# Patient Record
Sex: FEMALE | Race: WHITE | NOT HISPANIC OR LATINO | Employment: UNEMPLOYED | URBAN - METROPOLITAN AREA
[De-identification: names, ages, dates, MRNs, and addresses within clinical notes are randomized per-mention and may not be internally consistent; named-entity substitution may affect disease eponyms.]

---

## 2018-01-24 ENCOUNTER — TRANSCRIBE ORDERS (OUTPATIENT)
Dept: ADMINISTRATIVE | Facility: HOSPITAL | Age: 58
End: 2018-01-24

## 2018-01-24 DIAGNOSIS — Z12.31 VISIT FOR SCREENING MAMMOGRAM: Primary | ICD-10-CM

## 2018-02-12 ENCOUNTER — APPOINTMENT (OUTPATIENT)
Dept: MAMMOGRAPHY | Facility: HOSPITAL | Age: 58
End: 2018-02-12

## 2018-02-16 ENCOUNTER — APPOINTMENT (OUTPATIENT)
Dept: OTHER | Facility: HOSPITAL | Age: 58
End: 2018-02-16

## 2018-02-16 ENCOUNTER — HOSPITAL ENCOUNTER (OUTPATIENT)
Dept: MAMMOGRAPHY | Facility: HOSPITAL | Age: 58
Discharge: HOME | End: 2018-02-16
Admitting: NURSE PRACTITIONER

## 2018-02-16 DIAGNOSIS — Z12.31 VISIT FOR SCREENING MAMMOGRAM: ICD-10-CM

## 2018-02-16 PROCEDURE — 77063 BREAST TOMOSYNTHESIS BI: CPT

## 2018-02-16 PROCEDURE — 77063 BREAST TOMOSYNTHESIS BI: CPT | Performed by: RADIOLOGY

## 2018-02-16 PROCEDURE — 77067 SCR MAMMO BI INCL CAD: CPT | Performed by: RADIOLOGY

## 2018-02-16 PROCEDURE — 77067 SCR MAMMO BI INCL CAD: CPT

## 2018-03-28 ENCOUNTER — APPOINTMENT (OUTPATIENT)
Dept: MAMMOGRAPHY | Facility: HOSPITAL | Age: 58
End: 2018-03-28

## 2018-04-05 ENCOUNTER — TRANSCRIBE ORDERS (OUTPATIENT)
Dept: MAMMOGRAPHY | Facility: HOSPITAL | Age: 58
End: 2018-04-05

## 2018-04-05 ENCOUNTER — HOSPITAL ENCOUNTER (OUTPATIENT)
Dept: ULTRASOUND IMAGING | Facility: HOSPITAL | Age: 58
Discharge: HOME | End: 2018-04-05

## 2018-04-05 ENCOUNTER — HOSPITAL ENCOUNTER (OUTPATIENT)
Dept: MAMMOGRAPHY | Facility: HOSPITAL | Age: 58
Discharge: HOME | End: 2018-04-05
Admitting: NURSE PRACTITIONER

## 2018-04-05 DIAGNOSIS — R92.8 ABNORMAL MAMMOGRAM: ICD-10-CM

## 2018-04-05 DIAGNOSIS — R92.8 ABNORMAL MAMMOGRAM: Primary | ICD-10-CM

## 2018-04-05 PROCEDURE — 77062 BREAST TOMOSYNTHESIS BI: CPT | Performed by: RADIOLOGY

## 2018-04-05 PROCEDURE — 76642 ULTRASOUND BREAST LIMITED: CPT

## 2018-04-05 PROCEDURE — G0279 TOMOSYNTHESIS, MAMMO: HCPCS

## 2018-04-05 PROCEDURE — 77066 DX MAMMO INCL CAD BI: CPT | Performed by: RADIOLOGY

## 2018-04-05 PROCEDURE — 77066 DX MAMMO INCL CAD BI: CPT

## 2018-04-05 PROCEDURE — 76642 ULTRASOUND BREAST LIMITED: CPT | Performed by: RADIOLOGY

## 2021-09-30 ENCOUNTER — TRANSCRIBE ORDERS (OUTPATIENT)
Dept: ADMINISTRATIVE | Facility: HOSPITAL | Age: 61
End: 2021-09-30

## 2021-09-30 DIAGNOSIS — Z12.31 VISIT FOR SCREENING MAMMOGRAM: Primary | ICD-10-CM

## 2021-10-27 ENCOUNTER — APPOINTMENT (OUTPATIENT)
Dept: MAMMOGRAPHY | Facility: HOSPITAL | Age: 61
End: 2021-10-27

## 2023-06-12 ENCOUNTER — TRANSCRIBE ORDERS (OUTPATIENT)
Dept: ADMINISTRATIVE | Facility: HOSPITAL | Age: 63
End: 2023-06-12

## 2023-06-12 DIAGNOSIS — R92.8 ABNORMAL MAMMOGRAM: Primary | ICD-10-CM

## 2025-03-26 ENCOUNTER — TRANSCRIBE ORDERS (OUTPATIENT)
Dept: ADMINISTRATIVE | Facility: HOSPITAL | Age: 65
End: 2025-03-26
Payer: MEDICARE

## 2025-03-26 DIAGNOSIS — N63.21 MASS OF UPPER OUTER QUADRANT OF LEFT BREAST: ICD-10-CM

## 2025-03-26 DIAGNOSIS — N63.20 MASS OF LEFT BREAST, UNSPECIFIED QUADRANT: Primary | ICD-10-CM

## 2025-04-08 LAB
NCCN CRITERIA FLAG: ABNORMAL
TYRER CUZICK SCORE: 4.2

## 2025-04-11 ENCOUNTER — RESULTS FOLLOW-UP (OUTPATIENT)
Facility: HOSPITAL | Age: 65
End: 2025-04-11
Payer: MEDICARE

## 2025-04-16 ENCOUNTER — HOSPITAL ENCOUNTER (OUTPATIENT)
Facility: HOSPITAL | Age: 65
Discharge: HOME | End: 2025-04-16
Payer: MEDICARE

## 2025-04-16 DIAGNOSIS — N63.21 MASS OF UPPER OUTER QUADRANT OF LEFT BREAST: ICD-10-CM

## 2025-04-16 PROCEDURE — 76642 ULTRASOUND BREAST LIMITED: CPT

## 2025-04-16 PROCEDURE — G0279 TOMOSYNTHESIS, MAMMO: HCPCS

## 2025-04-16 PROCEDURE — 77066 DX MAMMO INCL CAD BI: CPT

## 2025-04-17 ENCOUNTER — TRANSCRIBE ORDERS (OUTPATIENT)
Dept: ADMINISTRATIVE | Facility: HOSPITAL | Age: 65
End: 2025-04-17
Payer: MEDICARE

## 2025-04-17 DIAGNOSIS — R92.8 ABNORMAL MAMMOGRAM: Primary | ICD-10-CM

## 2025-04-21 DIAGNOSIS — Z13.79 GENETIC TESTING: Primary | ICD-10-CM

## 2025-04-30 ENCOUNTER — HOSPITAL ENCOUNTER (OUTPATIENT)
Facility: HOSPITAL | Age: 65
Discharge: HOME | End: 2025-04-30
Payer: MEDICARE

## 2025-04-30 DIAGNOSIS — R92.8 ABNORMAL MAMMOGRAM: ICD-10-CM

## 2025-04-30 PROCEDURE — 25010000002 LIDOCAINE 1 % SOLUTION: Performed by: RADIOLOGY

## 2025-04-30 PROCEDURE — A4648 IMPLANTABLE TISSUE MARKER: HCPCS

## 2025-04-30 PROCEDURE — 25010000002 LIDOCAINE 1% - EPINEPHRINE 1:100000 1 %-1:100000 SOLUTION: Performed by: RADIOLOGY

## 2025-04-30 RX ORDER — LIDOCAINE HYDROCHLORIDE 10 MG/ML
5 INJECTION, SOLUTION INFILTRATION; PERINEURAL ONCE
Status: COMPLETED | OUTPATIENT
Start: 2025-04-30 | End: 2025-04-30

## 2025-04-30 RX ORDER — LIDOCAINE HYDROCHLORIDE AND EPINEPHRINE 10; 10 MG/ML; UG/ML
10 INJECTION, SOLUTION INFILTRATION; PERINEURAL ONCE
Status: COMPLETED | OUTPATIENT
Start: 2025-04-30 | End: 2025-04-30

## 2025-04-30 RX ADMIN — LIDOCAINE HYDROCHLORIDE,EPINEPHRINE BITARTRATE 5 ML: 10; .01 INJECTION, SOLUTION INFILTRATION; PERINEURAL at 13:45

## 2025-04-30 RX ADMIN — LIDOCAINE HYDROCHLORIDE 2.5 ML: 10 INJECTION, SOLUTION INFILTRATION; PERINEURAL at 13:45

## 2025-04-30 RX ADMIN — LIDOCAINE HYDROCHLORIDE,EPINEPHRINE BITARTRATE 10 ML: 10; .01 INJECTION, SOLUTION INFILTRATION; PERINEURAL at 13:45

## 2025-04-30 NOTE — PROGRESS NOTES
Alert and oriented. Denies discomfort, no active bleeding, steri-strips not visualized, gauze dressing intact. Cold pack applied to breast over bandage. Questions answered, support given. Verbalizes and demonstrates understanding of post-care instructions, written copy given.

## 2025-05-05 ENCOUNTER — TELEPHONE (OUTPATIENT)
Facility: HOSPITAL | Age: 65
End: 2025-05-05
Payer: MEDICARE

## 2025-05-05 LAB
CYTO UR: NORMAL
CYTO UR: NORMAL
LAB AP CASE REPORT: NORMAL
LAB AP CASE REPORT: NORMAL
LAB AP CLINICAL INFORMATION: NORMAL
LAB AP CLINICAL INFORMATION: NORMAL
LAB AP DIAGNOSIS COMMENT: NORMAL
LAB AP DIAGNOSIS COMMENT: NORMAL
LAB AP SPECIAL STAINS: NORMAL
LAB AP SPECIAL STAINS: NORMAL
PATH REPORT.FINAL DX SPEC: NORMAL
PATH REPORT.FINAL DX SPEC: NORMAL
PATH REPORT.GROSS SPEC: NORMAL
PATH REPORT.GROSS SPEC: NORMAL

## 2025-05-06 ENCOUNTER — APPOINTMENT (OUTPATIENT)
Dept: CT IMAGING | Facility: HOSPITAL | Age: 65
End: 2025-05-06
Payer: MEDICARE

## 2025-05-06 ENCOUNTER — TELEPHONE (OUTPATIENT)
Facility: HOSPITAL | Age: 65
End: 2025-05-06
Payer: MEDICARE

## 2025-05-06 ENCOUNTER — HOSPITAL ENCOUNTER (INPATIENT)
Facility: HOSPITAL | Age: 65
LOS: 1 days | Discharge: HOME | End: 2025-05-07
Attending: EMERGENCY MEDICINE | Admitting: INTERNAL MEDICINE
Payer: MEDICARE

## 2025-05-06 DIAGNOSIS — J96.02 ACUTE RESPIRATORY FAILURE WITH HYPOXIA AND HYPERCAPNIA: ICD-10-CM

## 2025-05-06 DIAGNOSIS — D72.829 LEUKOCYTOSIS, UNSPECIFIED TYPE: ICD-10-CM

## 2025-05-06 DIAGNOSIS — I95.9 HYPOTENSION, UNSPECIFIED HYPOTENSION TYPE: ICD-10-CM

## 2025-05-06 DIAGNOSIS — J96.01 ACUTE RESPIRATORY FAILURE WITH HYPOXIA: ICD-10-CM

## 2025-05-06 DIAGNOSIS — J96.01 ACUTE RESPIRATORY FAILURE WITH HYPOXIA AND HYPERCAPNIA: ICD-10-CM

## 2025-05-06 DIAGNOSIS — R41.82 ALTERED MENTAL STATUS, UNSPECIFIED ALTERED MENTAL STATUS TYPE: Primary | ICD-10-CM

## 2025-05-06 DIAGNOSIS — J44.0 CHRONIC OBSTRUCTIVE PULMONARY DISEASE WITH ACUTE LOWER RESPIRATORY INFECTION: ICD-10-CM

## 2025-05-06 PROBLEM — G89.4 CHRONIC PAIN SYNDROME: Status: ACTIVE | Noted: 2025-05-06

## 2025-05-06 PROBLEM — J44.9 COPD (CHRONIC OBSTRUCTIVE PULMONARY DISEASE): Status: ACTIVE | Noted: 2025-05-06

## 2025-05-06 PROBLEM — Z72.0 TOBACCO USE: Status: ACTIVE | Noted: 2025-05-06

## 2025-05-06 PROBLEM — C50.919 MALIGNANT NEOPLASM OF FEMALE BREAST: Status: ACTIVE | Noted: 2025-05-06

## 2025-05-06 LAB
ALBUMIN SERPL-MCNC: 3.6 G/DL (ref 3.5–5.2)
ALBUMIN/GLOB SERPL: 0.9 G/DL
ALP SERPL-CCNC: 110 U/L (ref 39–117)
ALT SERPL W P-5'-P-CCNC: 19 U/L (ref 1–33)
AMPHET+METHAMPHET UR QL: NEGATIVE
AMPHETAMINES UR QL: NEGATIVE
ANION GAP SERPL CALCULATED.3IONS-SCNC: 10 MMOL/L (ref 5–15)
APAP SERPL-MCNC: <5 MCG/ML (ref 0–30)
ARTERIAL PATENCY WRIST A: POSITIVE
AST SERPL-CCNC: 41 U/L (ref 1–32)
ATMOSPHERIC PRESS: ABNORMAL MM[HG]
B PARAPERT DNA SPEC QL NAA+PROBE: NOT DETECTED
B PERT DNA SPEC QL NAA+PROBE: NOT DETECTED
BARBITURATES UR QL SCN: NEGATIVE
BASE EXCESS BLDA CALC-SCNC: 7.8 MMOL/L (ref 0–2)
BASOPHILS # BLD AUTO: 0.04 10*3/MM3 (ref 0–0.2)
BASOPHILS NFR BLD AUTO: 0.3 % (ref 0–1.5)
BDY SITE: ABNORMAL
BENZODIAZ UR QL SCN: NEGATIVE
BILIRUB SERPL-MCNC: 0.2 MG/DL (ref 0–1.2)
BILIRUB UR QL STRIP: NEGATIVE
BODY TEMPERATURE: 37
BUN SERPL-MCNC: 10 MG/DL (ref 8–23)
BUN/CREAT SERPL: 11.6 (ref 7–25)
BUPRENORPHINE SERPL-MCNC: NEGATIVE NG/ML
C PNEUM DNA NPH QL NAA+NON-PROBE: NOT DETECTED
CALCIUM SPEC-SCNC: 9.6 MG/DL (ref 8.6–10.5)
CANNABINOIDS SERPL QL: POSITIVE
CHLORIDE SERPL-SCNC: 104 MMOL/L (ref 98–107)
CLARITY UR: CLEAR
CO2 BLDA-SCNC: 34.1 MMOL/L (ref 22–33)
CO2 SERPL-SCNC: 29 MMOL/L (ref 22–29)
COCAINE UR QL: NEGATIVE
COHGB MFR BLD: 2.6 % (ref 0–2)
COLOR UR: YELLOW
CREAT SERPL-MCNC: 0.86 MG/DL (ref 0.57–1)
D DIMER PPP FEU-MCNC: 2.13 MCGFEU/ML (ref 0–0.66)
D-LACTATE SERPL-SCNC: 0.8 MMOL/L (ref 0.5–2)
D-LACTATE SERPL-SCNC: 2.5 MMOL/L (ref 0.5–2)
DEPRECATED RDW RBC AUTO: 51 FL (ref 37–54)
EGFRCR SERPLBLD CKD-EPI 2021: 74.6 ML/MIN/1.73
EOSINOPHIL # BLD AUTO: 0 10*3/MM3 (ref 0–0.4)
EOSINOPHIL NFR BLD AUTO: 0 % (ref 0.3–6.2)
EPAP: 0
ERYTHROCYTE [DISTWIDTH] IN BLOOD BY AUTOMATED COUNT: 13.3 % (ref 12.3–15.4)
ERYTHROCYTE [SEDIMENTATION RATE] IN BLOOD: 88 MM/HR (ref 0–30)
ETHANOL BLD-MCNC: <10 MG/DL (ref 0–10)
FENTANYL UR-MCNC: NEGATIVE NG/ML
FLUAV SUBTYP SPEC NAA+PROBE: NOT DETECTED
FLUBV RNA ISLT QL NAA+PROBE: NOT DETECTED
GEN 5 1HR TROPONIN T REFLEX: 149 NG/L
GLOBULIN UR ELPH-MCNC: 4.1 GM/DL
GLUCOSE SERPL-MCNC: 124 MG/DL (ref 65–99)
GLUCOSE UR STRIP-MCNC: NEGATIVE MG/DL
HADV DNA SPEC NAA+PROBE: NOT DETECTED
HCO3 BLDA-SCNC: 32.7 MMOL/L (ref 20–26)
HCOV 229E RNA SPEC QL NAA+PROBE: NOT DETECTED
HCOV HKU1 RNA SPEC QL NAA+PROBE: NOT DETECTED
HCOV NL63 RNA SPEC QL NAA+PROBE: NOT DETECTED
HCOV OC43 RNA SPEC QL NAA+PROBE: NOT DETECTED
HCT VFR BLD AUTO: 42.5 % (ref 34–46.6)
HCT VFR BLD CALC: 37.7 % (ref 38–51)
HGB BLD-MCNC: 12.9 G/DL (ref 12–15.9)
HGB BLDA-MCNC: 12.3 G/DL (ref 14–18)
HGB UR QL STRIP.AUTO: NEGATIVE
HMPV RNA NPH QL NAA+NON-PROBE: NOT DETECTED
HPIV1 RNA ISLT QL NAA+PROBE: NOT DETECTED
HPIV2 RNA SPEC QL NAA+PROBE: NOT DETECTED
HPIV3 RNA NPH QL NAA+PROBE: NOT DETECTED
HPIV4 P GENE NPH QL NAA+PROBE: NOT DETECTED
IMM GRANULOCYTES # BLD AUTO: 0.09 10*3/MM3 (ref 0–0.05)
IMM GRANULOCYTES NFR BLD AUTO: 0.7 % (ref 0–0.5)
INHALED O2 CONCENTRATION: 28 %
IPAP: 0
KETONES UR QL STRIP: NEGATIVE
LEUKOCYTE ESTERASE UR QL STRIP.AUTO: NEGATIVE
LYMPHOCYTES # BLD AUTO: 1.04 10*3/MM3 (ref 0.7–3.1)
LYMPHOCYTES NFR BLD AUTO: 7.9 % (ref 19.6–45.3)
M PNEUMO IGG SER IA-ACNC: NOT DETECTED
MAGNESIUM SERPL-MCNC: 2.5 MG/DL (ref 1.6–2.4)
MCH RBC QN AUTO: 31.1 PG (ref 26.6–33)
MCHC RBC AUTO-ENTMCNC: 30.4 G/DL (ref 31.5–35.7)
MCV RBC AUTO: 102.4 FL (ref 79–97)
METHADONE UR QL SCN: POSITIVE
METHGB BLD QL: 0.2 % (ref 0–1.5)
MODALITY: ABNORMAL
MONOCYTES # BLD AUTO: 0.59 10*3/MM3 (ref 0.1–0.9)
MONOCYTES NFR BLD AUTO: 4.5 % (ref 5–12)
NEUTROPHILS NFR BLD AUTO: 11.45 10*3/MM3 (ref 1.7–7)
NEUTROPHILS NFR BLD AUTO: 86.6 % (ref 42.7–76)
NITRITE UR QL STRIP: NEGATIVE
NRBC BLD AUTO-RTO: 0 /100 WBC (ref 0–0.2)
NT-PROBNP SERPL-MCNC: 502.9 PG/ML (ref 0–900)
OPIATES UR QL: NEGATIVE
OXYCODONE UR QL SCN: NEGATIVE
OXYHGB MFR BLDV: 94.3 % (ref 94–99)
PAW @ PEAK INSP FLOW SETTING VENT: 0 CMH2O
PCO2 BLDA: 45.7 MM HG (ref 35–45)
PCO2 TEMP ADJ BLD: 45.7 MM HG (ref 35–45)
PCP UR QL SCN: NEGATIVE
PH BLDA: 7.46 PH UNITS (ref 7.35–7.45)
PH UR STRIP.AUTO: 7 [PH] (ref 5–8)
PH, TEMP CORRECTED: 7.46 PH UNITS
PLATELET # BLD AUTO: 245 10*3/MM3 (ref 140–450)
PMV BLD AUTO: 10.6 FL (ref 6–12)
PO2 BLDA: 76.4 MM HG (ref 83–108)
PO2 TEMP ADJ BLD: 76.4 MM HG (ref 83–108)
POTASSIUM SERPL-SCNC: 3.9 MMOL/L (ref 3.5–5.2)
PROCALCITONIN SERPL-MCNC: 0.21 NG/ML (ref 0–0.25)
PROT SERPL-MCNC: 7.7 G/DL (ref 6–8.5)
PROT UR QL STRIP: NEGATIVE
QT INTERVAL: 400 MS
QTC INTERVAL: 508 MS
RBC # BLD AUTO: 4.15 10*6/MM3 (ref 3.77–5.28)
RHINOVIRUS RNA SPEC NAA+PROBE: NOT DETECTED
RSV RNA NPH QL NAA+NON-PROBE: NOT DETECTED
SALICYLATES SERPL-MCNC: <0.3 MG/DL
SARS-COV-2 RNA NPH QL NAA+NON-PROBE: NOT DETECTED
SODIUM SERPL-SCNC: 143 MMOL/L (ref 136–145)
SP GR UR STRIP: 1.01 (ref 1–1.03)
TOTAL RATE: 0 BREATHS/MINUTE
TRICYCLICS UR QL SCN: NEGATIVE
TROPONIN T % DELTA: 4
TROPONIN T NUMERIC DELTA: 6 NG/L
TROPONIN T SERPL HS-MCNC: 143 NG/L
TSH SERPL DL<=0.05 MIU/L-ACNC: 1.01 UIU/ML (ref 0.27–4.2)
UROBILINOGEN UR QL STRIP: NORMAL
WBC NRBC COR # BLD AUTO: 13.21 10*3/MM3 (ref 3.4–10.8)

## 2025-05-06 PROCEDURE — 25010000002 HALOPERIDOL LACTATE PER 5 MG: Performed by: EMERGENCY MEDICINE

## 2025-05-06 PROCEDURE — 25810000003 SODIUM CHLORIDE 0.9 % SOLUTION: Performed by: EMERGENCY MEDICINE

## 2025-05-06 PROCEDURE — 80179 DRUG ASSAY SALICYLATE: CPT | Performed by: EMERGENCY MEDICINE

## 2025-05-06 PROCEDURE — 83605 ASSAY OF LACTIC ACID: CPT | Performed by: EMERGENCY MEDICINE

## 2025-05-06 PROCEDURE — 85025 COMPLETE CBC W/AUTO DIFF WBC: CPT | Performed by: EMERGENCY MEDICINE

## 2025-05-06 PROCEDURE — 82077 ASSAY SPEC XCP UR&BREATH IA: CPT | Performed by: EMERGENCY MEDICINE

## 2025-05-06 PROCEDURE — 99291 CRITICAL CARE FIRST HOUR: CPT

## 2025-05-06 PROCEDURE — 87040 BLOOD CULTURE FOR BACTERIA: CPT | Performed by: EMERGENCY MEDICINE

## 2025-05-06 PROCEDURE — 85652 RBC SED RATE AUTOMATED: CPT | Performed by: EMERGENCY MEDICINE

## 2025-05-06 PROCEDURE — 36415 COLL VENOUS BLD VENIPUNCTURE: CPT

## 2025-05-06 PROCEDURE — 80307 DRUG TEST PRSMV CHEM ANLYZR: CPT | Performed by: EMERGENCY MEDICINE

## 2025-05-06 PROCEDURE — 94799 UNLISTED PULMONARY SVC/PX: CPT

## 2025-05-06 PROCEDURE — 0202U NFCT DS 22 TRGT SARS-COV-2: CPT | Performed by: EMERGENCY MEDICINE

## 2025-05-06 PROCEDURE — 84145 PROCALCITONIN (PCT): CPT | Performed by: EMERGENCY MEDICINE

## 2025-05-06 PROCEDURE — 25010000002 THIAMINE HCL 200 MG/2ML SOLUTION: Performed by: EMERGENCY MEDICINE

## 2025-05-06 PROCEDURE — 25010000002 METHYLPREDNISOLONE PER 125 MG: Performed by: EMERGENCY MEDICINE

## 2025-05-06 PROCEDURE — 93005 ELECTROCARDIOGRAM TRACING: CPT | Performed by: EMERGENCY MEDICINE

## 2025-05-06 PROCEDURE — 84443 ASSAY THYROID STIM HORMONE: CPT | Performed by: EMERGENCY MEDICINE

## 2025-05-06 PROCEDURE — 25010000002 VANCOMYCIN 2-0.9 GM/500ML-% SOLUTION: Performed by: EMERGENCY MEDICINE

## 2025-05-06 PROCEDURE — 94640 AIRWAY INHALATION TREATMENT: CPT

## 2025-05-06 PROCEDURE — 36600 WITHDRAWAL OF ARTERIAL BLOOD: CPT

## 2025-05-06 PROCEDURE — 84484 ASSAY OF TROPONIN QUANT: CPT | Performed by: EMERGENCY MEDICINE

## 2025-05-06 PROCEDURE — 25010000002 PIPERACILLIN SOD-TAZOBACTAM PER 1 G: Performed by: EMERGENCY MEDICINE

## 2025-05-06 PROCEDURE — 80053 COMPREHEN METABOLIC PANEL: CPT | Performed by: EMERGENCY MEDICINE

## 2025-05-06 PROCEDURE — 82805 BLOOD GASES W/O2 SATURATION: CPT

## 2025-05-06 PROCEDURE — 25010000002 ENOXAPARIN PER 10 MG: Performed by: NURSE PRACTITIONER

## 2025-05-06 PROCEDURE — 25510000001 IOPAMIDOL PER 1 ML: Performed by: INTERNAL MEDICINE

## 2025-05-06 PROCEDURE — P9612 CATHETERIZE FOR URINE SPEC: HCPCS

## 2025-05-06 PROCEDURE — 85379 FIBRIN DEGRADATION QUANT: CPT | Performed by: EMERGENCY MEDICINE

## 2025-05-06 PROCEDURE — 81003 URINALYSIS AUTO W/O SCOPE: CPT | Performed by: EMERGENCY MEDICINE

## 2025-05-06 PROCEDURE — 99291 CRITICAL CARE FIRST HOUR: CPT | Performed by: INTERNAL MEDICINE

## 2025-05-06 PROCEDURE — 70450 CT HEAD/BRAIN W/O DYE: CPT

## 2025-05-06 PROCEDURE — 83880 ASSAY OF NATRIURETIC PEPTIDE: CPT | Performed by: EMERGENCY MEDICINE

## 2025-05-06 PROCEDURE — 71250 CT THORAX DX C-: CPT

## 2025-05-06 PROCEDURE — 83050 HGB METHEMOGLOBIN QUAN: CPT

## 2025-05-06 PROCEDURE — 71275 CT ANGIOGRAPHY CHEST: CPT

## 2025-05-06 PROCEDURE — 80143 DRUG ASSAY ACETAMINOPHEN: CPT | Performed by: EMERGENCY MEDICINE

## 2025-05-06 PROCEDURE — 82375 ASSAY CARBOXYHB QUANT: CPT

## 2025-05-06 PROCEDURE — 83735 ASSAY OF MAGNESIUM: CPT | Performed by: EMERGENCY MEDICINE

## 2025-05-06 PROCEDURE — 94761 N-INVAS EAR/PLS OXIMETRY MLT: CPT

## 2025-05-06 RX ORDER — BISACODYL 5 MG/1
5 TABLET, DELAYED RELEASE ORAL DAILY PRN
Status: DISCONTINUED | OUTPATIENT
Start: 2025-05-06 | End: 2025-05-07 | Stop reason: HOSPADM

## 2025-05-06 RX ORDER — POLYETHYLENE GLYCOL 3350 17 G
2 POWDER IN PACKET (EA) ORAL
Status: DISCONTINUED | OUTPATIENT
Start: 2025-05-06 | End: 2025-05-07 | Stop reason: HOSPADM

## 2025-05-06 RX ORDER — SODIUM CHLORIDE 9 MG/ML
125 INJECTION, SOLUTION INTRAVENOUS CONTINUOUS
Status: ACTIVE | OUTPATIENT
Start: 2025-05-06 | End: 2025-05-06

## 2025-05-06 RX ORDER — GABAPENTIN 600 MG/1
600 TABLET ORAL 4 TIMES DAILY
COMMUNITY

## 2025-05-06 RX ORDER — IPRATROPIUM BROMIDE AND ALBUTEROL SULFATE 2.5; .5 MG/3ML; MG/3ML
3 SOLUTION RESPIRATORY (INHALATION) EVERY 6 HOURS PRN
Status: DISCONTINUED | OUTPATIENT
Start: 2025-05-06 | End: 2025-05-07 | Stop reason: HOSPADM

## 2025-05-06 RX ORDER — NICOTINE 21 MG/24HR
1 PATCH, TRANSDERMAL 24 HOURS TRANSDERMAL
Status: DISCONTINUED | OUTPATIENT
Start: 2025-05-06 | End: 2025-05-07 | Stop reason: HOSPADM

## 2025-05-06 RX ORDER — ALBUTEROL SULFATE 0.83 MG/ML
2.5 SOLUTION RESPIRATORY (INHALATION) EVERY 6 HOURS PRN
Status: DISCONTINUED | OUTPATIENT
Start: 2025-05-06 | End: 2025-05-06

## 2025-05-06 RX ORDER — POLYETHYLENE GLYCOL 3350 17 G/17G
17 POWDER, FOR SOLUTION ORAL DAILY PRN
Status: DISCONTINUED | OUTPATIENT
Start: 2025-05-06 | End: 2025-05-07 | Stop reason: HOSPADM

## 2025-05-06 RX ORDER — THIAMINE HYDROCHLORIDE 100 MG/ML
100 INJECTION, SOLUTION INTRAMUSCULAR; INTRAVENOUS ONCE
Status: COMPLETED | OUTPATIENT
Start: 2025-05-06 | End: 2025-05-06

## 2025-05-06 RX ORDER — IOPAMIDOL 755 MG/ML
75 INJECTION, SOLUTION INTRAVASCULAR
Status: COMPLETED | OUTPATIENT
Start: 2025-05-06 | End: 2025-05-06

## 2025-05-06 RX ORDER — AZITHROMYCIN 250 MG/1
250 TABLET, FILM COATED ORAL
Status: DISCONTINUED | OUTPATIENT
Start: 2025-05-06 | End: 2025-05-07 | Stop reason: HOSPADM

## 2025-05-06 RX ORDER — VANCOMYCIN 2 GRAM/500 ML IN 0.9 % SODIUM CHLORIDE INTRAVENOUS
20 ONCE
Status: COMPLETED | OUTPATIENT
Start: 2025-05-06 | End: 2025-05-06

## 2025-05-06 RX ORDER — SODIUM CHLORIDE FOR INHALATION 7 %
4 VIAL, NEBULIZER (ML) INHALATION
Status: DISCONTINUED | OUTPATIENT
Start: 2025-05-06 | End: 2025-05-07 | Stop reason: HOSPADM

## 2025-05-06 RX ORDER — HALOPERIDOL 5 MG/ML
2 INJECTION INTRAMUSCULAR ONCE
Status: COMPLETED | OUTPATIENT
Start: 2025-05-06 | End: 2025-05-06

## 2025-05-06 RX ORDER — BISACODYL 10 MG
10 SUPPOSITORY, RECTAL RECTAL DAILY PRN
Status: DISCONTINUED | OUTPATIENT
Start: 2025-05-06 | End: 2025-05-07 | Stop reason: HOSPADM

## 2025-05-06 RX ORDER — ENOXAPARIN SODIUM 100 MG/ML
40 INJECTION SUBCUTANEOUS DAILY
Status: DISCONTINUED | OUTPATIENT
Start: 2025-05-06 | End: 2025-05-07 | Stop reason: HOSPADM

## 2025-05-06 RX ORDER — SODIUM CHLORIDE 9 MG/ML
40 INJECTION, SOLUTION INTRAVENOUS AS NEEDED
Status: DISCONTINUED | OUTPATIENT
Start: 2025-05-06 | End: 2025-05-07 | Stop reason: HOSPADM

## 2025-05-06 RX ORDER — IPRATROPIUM BROMIDE AND ALBUTEROL SULFATE 2.5; .5 MG/3ML; MG/3ML
3 SOLUTION RESPIRATORY (INHALATION)
Status: DISCONTINUED | OUTPATIENT
Start: 2025-05-06 | End: 2025-05-07 | Stop reason: HOSPADM

## 2025-05-06 RX ORDER — AMOXICILLIN 250 MG
2 CAPSULE ORAL 2 TIMES DAILY
Status: DISCONTINUED | OUTPATIENT
Start: 2025-05-06 | End: 2025-05-07 | Stop reason: HOSPADM

## 2025-05-06 RX ORDER — SODIUM CHLORIDE 0.9 % (FLUSH) 0.9 %
10 SYRINGE (ML) INJECTION EVERY 12 HOURS SCHEDULED
Status: DISCONTINUED | OUTPATIENT
Start: 2025-05-06 | End: 2025-05-07 | Stop reason: HOSPADM

## 2025-05-06 RX ORDER — METHADONE HYDROCHLORIDE 5 MG/5ML
105 SOLUTION ORAL DAILY
COMMUNITY

## 2025-05-06 RX ORDER — ALBUTEROL SULFATE 90 UG/1
2 INHALANT RESPIRATORY (INHALATION) EVERY 6 HOURS PRN
Status: DISCONTINUED | OUTPATIENT
Start: 2025-05-06 | End: 2025-05-06

## 2025-05-06 RX ORDER — SODIUM CHLORIDE 0.9 % (FLUSH) 0.9 %
10 SYRINGE (ML) INJECTION AS NEEDED
Status: DISCONTINUED | OUTPATIENT
Start: 2025-05-06 | End: 2025-05-07 | Stop reason: HOSPADM

## 2025-05-06 RX ORDER — IPRATROPIUM BROMIDE AND ALBUTEROL SULFATE 2.5; .5 MG/3ML; MG/3ML
3 SOLUTION RESPIRATORY (INHALATION) ONCE
Status: COMPLETED | OUTPATIENT
Start: 2025-05-06 | End: 2025-05-06

## 2025-05-06 RX ORDER — ATORVASTATIN CALCIUM 40 MG/1
40 TABLET, FILM COATED ORAL NIGHTLY
Status: ON HOLD | COMMUNITY
End: 2025-05-07

## 2025-05-06 RX ORDER — BUDESONIDE 0.5 MG/2ML
0.5 INHALANT ORAL
Status: DISCONTINUED | OUTPATIENT
Start: 2025-05-06 | End: 2025-05-07 | Stop reason: HOSPADM

## 2025-05-06 RX ORDER — METHYLPREDNISOLONE SODIUM SUCCINATE 125 MG/2ML
125 INJECTION, POWDER, LYOPHILIZED, FOR SOLUTION INTRAMUSCULAR; INTRAVENOUS ONCE
Status: COMPLETED | OUTPATIENT
Start: 2025-05-06 | End: 2025-05-06

## 2025-05-06 RX ADMIN — METHYLPREDNISOLONE SODIUM SUCCINATE 125 MG: 125 INJECTION INTRAMUSCULAR; INTRAVENOUS at 11:13

## 2025-05-06 RX ADMIN — MUPIROCIN 1 APPLICATION: 20 OINTMENT TOPICAL at 20:33

## 2025-05-06 RX ADMIN — NICOTINE 1 PATCH: 21 PATCH TRANSDERMAL at 18:13

## 2025-05-06 RX ADMIN — SENNOSIDES AND DOCUSATE SODIUM 2 TABLET: 50; 8.6 TABLET ORAL at 20:33

## 2025-05-06 RX ADMIN — IPRATROPIUM BROMIDE AND ALBUTEROL SULFATE 3 ML: 2.5; .5 SOLUTION RESPIRATORY (INHALATION) at 19:23

## 2025-05-06 RX ADMIN — SODIUM CHLORIDE 1000 ML: 9 INJECTION, SOLUTION INTRAVENOUS at 11:18

## 2025-05-06 RX ADMIN — SODIUM CHLORIDE 125 ML/HR: 9 INJECTION, SOLUTION INTRAVENOUS at 18:12

## 2025-05-06 RX ADMIN — MUPIROCIN 1 APPLICATION: 20 OINTMENT TOPICAL at 13:14

## 2025-05-06 RX ADMIN — PIPERACILLIN AND TAZOBACTAM 3.38 G: 3; .375 INJECTION, POWDER, LYOPHILIZED, FOR SOLUTION INTRAVENOUS at 11:15

## 2025-05-06 RX ADMIN — SODIUM CHLORIDE SOLN NEBU 7% 4 ML: 7 NEBU SOLN at 19:24

## 2025-05-06 RX ADMIN — IOPAMIDOL 75 ML: 755 INJECTION, SOLUTION INTRAVENOUS at 12:00

## 2025-05-06 RX ADMIN — ENOXAPARIN SODIUM 40 MG: 100 INJECTION SUBCUTANEOUS at 13:14

## 2025-05-06 RX ADMIN — AZITHROMYCIN DIHYDRATE 250 MG: 250 TABLET ORAL at 18:13

## 2025-05-06 RX ADMIN — Medication 2000 MG: at 13:13

## 2025-05-06 RX ADMIN — THIAMINE HYDROCHLORIDE 100 MG: 100 INJECTION, SOLUTION INTRAMUSCULAR; INTRAVENOUS at 09:29

## 2025-05-06 RX ADMIN — BUDESONIDE 0.5 MG: 0.5 SUSPENSION RESPIRATORY (INHALATION) at 19:23

## 2025-05-06 RX ADMIN — Medication 10 ML: at 13:14

## 2025-05-06 RX ADMIN — IPRATROPIUM BROMIDE AND ALBUTEROL SULFATE 3 ML: 2.5; .5 SOLUTION RESPIRATORY (INHALATION) at 09:19

## 2025-05-06 RX ADMIN — HALOPERIDOL LACTATE 2 MG: 5 INJECTION, SOLUTION INTRAMUSCULAR at 09:03

## 2025-05-06 RX ADMIN — Medication 10 ML: at 20:33

## 2025-05-06 RX ADMIN — SODIUM CHLORIDE 125 ML/HR: 9 INJECTION, SOLUTION INTRAVENOUS at 09:29

## 2025-05-06 NOTE — H&P
Pulmonary/Critical Care History and Physical Exam     LOS: 0 days   Patient Care Team:  Rose Mary Covington MD as PCP - General (Family Medicine)    Chief Complaint:    Chief Complaint   Patient presents with    Altered Mental Status       Subjective     HPI:     Ms. Thornton is a 64 year old female (smoker) with pmhx of COPD and chronic pain who presented to [facility] ED on 5/6/25 via EMS after being found with altered mental status by roommate. Patient was poorly responsive upon EMS arrival and was given Narcan 4 mg x 1 without significant improvement. She was placed on 2L NC and transported to [facility]. Upon arrival here, she remained altered and became more hypoxic requiring HFNC. CT H and CT chest were unremarkable. ProCal, respiratory panel also unremarkable. Lactate 2.5, troponin mildly elevated, ABG- 7/463/45.7/76.4/32.7.  UDS positive for THC & methadone.     Per family, patient experiments with substances sold at [employer] (her place of employment), takes methadone and Neurontin routinely. Patient was admitted to the ICU for higher level of care. Upon arrival to the ICU, she is requesting to leave AMA. She is disoriented to place, time and situation. She believes she was given an unknown substance, at an unknown location by someone she doesn't know.     Of note, she was previously seen by  pulmonary. She only uses albuterol inhaler PRN. She reports that she was recently diagnosed with left breast cancer and has an appointment to see heme/onc later this week.     Time spent: 5 minutes. Electronically signed by ALINA De León, 05/06/25, 11:50 AM EDT.    I agree with the ALINA note.  Add to that: Patient currently is a smoker about 1 pack every week.  She said that she does not smoke THC but she just had a gummy recently.  She started smoking at the age 18-19 years old.  No prior hospitalization for COPD per her reports.  She does not use the albuterol inhaler much at baseline and does not use oxygen either.   She has not seen a pulmonologist for >1-year.  She said she has been feeling ill for 2 days but did not elaborate much.  She alluded to dyspnea but no significant cough.  She said that her son was not able to wake her up today so he called EMS. No prior hx of VTE.     History taken from: chart & patient    No past medical history on file.    No past surgical history on file.    Family History   Problem Relation Age of Onset    Ovarian cancer Mother 46    No Known Problems Father     No Known Problems Sister     No Known Problems Brother     No Known Problems Daughter     No Known Problems Son     No Known Problems Maternal Grandmother     No Known Problems Paternal Grandmother     No Known Problems Maternal Aunt     No Known Problems Paternal Aunt     BRCA 1/2 Neg Hx     Breast cancer Neg Hx     Colon cancer Neg Hx     Endometrial cancer Neg Hx        Social History     Socioeconomic History    Marital status:        Not on File    Past Medical History/Family History/Social History were reviewed by me and updates were made.     Review of Systems:    Review of 14 systems was completed with positives and pertinent negatives noted in the subjective section.  All other systems reviewed and are negative.         Objective     Vital Signs  Temp:  [99.7 °F (37.6 °C)] 99.7 °F (37.6 °C)  Heart Rate:  [] 76  Resp:  [22-24] 22  BP: ()/(57-79) 91/59  Body mass index is 38.62 kg/m².     No intake or output data in the 24 hours ending 05/06/25 1108   Physical Exam:     General Appearance:    Alert, cooperative, in no acute distress   Head:    Normocephalic, without obvious abnormality, atraumatic   Eyes:            Lids and lashes normal, conjunctivae and sclerae normal,    no icterus, no pallor, corneas clear, PERRL   ENMT:   Ears appear intact with no abnormalities noted      No oral lesions, no thrush, oral mucosa moist   Neck:   No adenopathy, supple, trachea midline, no thyromegaly,      no carotid bruit,  no JVD   Lungs/resp:   Mild crackles at the bases posteriorly.  Equal but significantly diminished air entry throughout.            Heart/CV:    Regular rhythm and normal rate, normal S1 and S2, no         murmur   Abdomen/GI:     Normal bowel sounds, no masses, no organomegaly, soft,    nontender, nondistended   G/U:     Deferred   Extremities/MSK:   No clubbing, cyanosis or edema.  Normal tone.  No deformities.    Pulses:   Pulses palpable and equal bilaterally   Skin:   No bleeding, bruising or rash   Hem/Lymph:   No cervical or supraclavicular adenopathy.    Neurologic:   Moves all extremities with no obvious focal motor deficit.  Cranial nerves 2 - 12 grossly intact            Psychiatric:  Normal mood and affect, oriented x 3.     The above findings are documentation of my personal physical exam findings from today.   Electronically signed by:  ALINA De León  05/06/25  11:08 EDT     Results Review:     I reviewed the patient's new clinical results.   Results from last 7 days   Lab Units 05/06/25  1001   SODIUM mmol/L 143   POTASSIUM mmol/L 3.9   CHLORIDE mmol/L 104   CO2 mmol/L 29.0   BUN mg/dL 10   CREATININE mg/dL 0.86   CALCIUM mg/dL 9.6   BILIRUBIN mg/dL 0.2   ALK PHOS U/L 110   ALT (SGPT) U/L 19   AST (SGOT) U/L 41*   GLUCOSE mg/dL 124*     Results from last 7 days   Lab Units 05/06/25  0922   WBC 10*3/mm3 13.21*   HEMOGLOBIN g/dL 12.9   HEMATOCRIT % 42.5   PLATELETS 10*3/mm3 245     Results from last 7 days   Lab Units 05/06/25  0922   PH, ARTERIAL pH units 7.463*   PO2 ART mm Hg 76.4*   PCO2, ARTERIAL mm Hg 45.7*   HCO3 ART mmol/L 32.7*       I reviewed the patient's new imaging including images and reports.    Medication Review:   methylPREDNISolone sodium succinate, 125 mg, Intravenous, Once  piperacillin-tazobactam, 3.375 g, Intravenous, Once  sodium chloride, 1,000 mL, Intravenous, Once  vancomycin, 20 mg/kg, Intravenous, Once      sodium chloride, 125 mL/hr, Last Rate: 125 mL/hr  (05/06/25 0929)      Diagnostic imaging:  I personally and Independently reviewed and interpreted the following images:  CT chest 5/6/25: Peribronchial cuffing in the lower lobes.  5 mm RLL superior segment pulmonary nodule.    Assessment & Plan     COPD exacerbation  Encephalopathy, secondary to meds (toxic), improved with Narcan  Acute hypoxic respiratory failure, secondary to the above.  Requiring HHFNC.  Abnormal chest imaging: Mucous plugging/peribronchial cuffing in the lower lobes.  RLL pulmonary nodule, 5 mm  Invasive ductal carcinoma of the left breast, diagnosed 4/30/2025  Tobacco abuse  Chronic pain, on methadone and Neurontin.    Admit to ICU  Oxygen by HHFNC and titrate to keep SpO2 >90%.  Requiring high flow now at 50% FiO2 and 50 L/min.  Initiate flutter & HS neb twice daily to improve mucus clearance   DuoNeb 4 times a day and Pulmicort twice daily  Solu-Medrol 40 mg every 8 hours  Continue holding Neurontin and methadone due to encephalopathy.  Would resume methadone later when she is cleared.  Antibiotics: Start azithromycin more for COPD exacerbation then for pneumonia  Will require follow-up CT chest regarding the pulmonary nodule noted on imaging.  This can be performed as outpatient.  3-month follow-up is recommended.  Counseled for smoking cessation          Critical care time : 52 minutes.(This excludes time spent performing separately reportable procedures and services). Critical care time includes high complexity decision making to assess, manipulate, and support vital organ system failure in this individual who has impairment of one or more vital organ systems such that there is a high probability of imminent or life threatening deterioration in the patient’s condition.    Jen Marquez, ALINA  05/06/25  11:08 EDT    I have reviewed this documentation and agree.  I personally obtained history, reviewed the data and and examined the patient. I provided more than half of the total  time dedicated to the treatment of this patient.       Electronically signed by Nash Robles MD, 05/06/25, 3:59 PM EDT.           Please note that portions of this note were completed with [employer] - a voice recognition program.

## 2025-05-06 NOTE — TELEPHONE ENCOUNTER
5.5.25 @ 1625: Dr LISETH Covington's office was notified (Cherri) of pathology returned as cancer and patient will be notified. An attempt to contact the patient was made at 1640 and a message was left for the patient to return the call. On 5.6.25 @ 1440: A second attempt to contact the patient was made, and again a message was left on her voice mail to return the call. It was noted that the patient had been admitted to ICU this afternoon after arriving in ED via ambulance. Per Dr LISETH Suarez's request, the patient's referring provider was contacted and Vidal notified of the above events, including the patient had not been contacted with the positive biopsy results and surgical consult that was scheduled (at the patient's request during post procedure instruction visit). Details of the surgical consult appointment with Dr YANCY Goncalves, are as follows: on 5.15.25 @ 1015 with arrival time of 1000, at the [facility], [address] [facility]. Vidal stated he will give the information to Dr Covington and they will contact the patient with pathology results and surgical consult appointment details. Offered my contact information if Dr Covington has any further questions.

## 2025-05-06 NOTE — PLAN OF CARE
Goal Outcome Evaluation:      Patient alert and oriented this evening, moves all extremities and pupils are PERRLA and 3mm.    NS infusing at 125mL/hr.    HFNC titrated down to 40% and 40L. SpO2 at 93%.    Nicotine patch applied and addictionology consulted.     Patient's son updated accordingly at the bedside.

## 2025-05-06 NOTE — ED PROVIDER NOTES
Subjective   History of Present Illness  This is a 64-year-old female brought to the emergency department today by [facility] for initial callout of altered mental status.  Apparently is a roommate that called.  Patient himself is unable to give history.  Last known normal 10 hours ago per EMS.  When they arrived patient was poorly responsive GCS was 10.  There was some question whether she had been on methadone she was given Narcan 4 mg without any improvement.  She was transported here on 2 L nasal cannula her initial sat was 90% on room air.  Apparently the patient was initially answering some simple questions.    No mention by paramedics of any substances laying around the apartment.    They said she works at a place called [employer] and may experiment with some new products when they arrive there.  Unclear if she did so or not.    Review of the chart shows looks like she had a breast biopsy yesterday at [facility].    Fingerstick blood sugar was elevated per EMS greater than 480.    Really could not obtain any other history or review of systems.  I have reviewed the chart.  No frequent ED visits.        Review of Systems   Unable to perform ROS: Mental status change       Past Medical History:   Diagnosis Date    Malignant neoplasm of female breast 5/6/2025       No Known Allergies    No past surgical history on file.    Family History   Problem Relation Age of Onset    Ovarian cancer Mother 46    No Known Problems Father     No Known Problems Sister     No Known Problems Brother     No Known Problems Daughter     No Known Problems Son     No Known Problems Maternal Grandmother     No Known Problems Paternal Grandmother     No Known Problems Maternal Aunt     No Known Problems Paternal Aunt     BRCA 1/2 Neg Hx     Breast cancer Neg Hx     Colon cancer Neg Hx     Endometrial cancer Neg Hx        Social History     Socioeconomic History    Marital status:            Objective   Physical  Exam  Vitals and nursing note reviewed. Exam conducted with a chaperone present.   Constitutional:       Comments: Is a 64-year-old who is seen in the CT scanner she is alert but combative she is maintaining her airway she has some congestion.  She will occasionally scream out.  She will not open her eyes spontaneously.  GCS is 9.   HENT:      Head: Normocephalic and atraumatic.      Right Ear: External ear normal.      Left Ear: External ear normal.      Nose: Nose normal.      Mouth/Throat:      Mouth: Mucous membranes are moist.      Pharynx: Oropharynx is clear.   Eyes:      Comments: Her pupils are little dilated probably 6 mm react difficult to assess extraocular movements but she seems to look in all directions.   Neck:      Vascular: No carotid bruit.   Cardiovascular:      Rate and Rhythm: Normal rate and regular rhythm.      Pulses: Normal pulses.      Heart sounds: Normal heart sounds.   Pulmonary:      Comments: Rhonchorous lung sounds bilaterally.  Abdominal:      Comments: BMI 38 soft and nontender without organomegaly, masses, or guarding.   Musculoskeletal:      Cervical back: Normal range of motion and neck supple. No rigidity or tenderness.      Comments: Equal full pulses in all extremities no edema synovitis rash or venous cords.   Skin:     General: Skin is warm and dry.      Capillary Refill: Capillary refill takes less than 2 seconds.   Neurological:      Comments: Face symmetric she yells she seems to be able to hear little bit she will open her eyes briefly.  She moves all extremities with good force and tries to push me away when I examine her.  No focal weakness it seems.  Knee jerks 1+ bilaterally.  Plantar response equivocal bilaterally.         Critical Care    Performed by: Luiz Valdes MD  Authorized by: Luiz Valdes MD    Critical care provider statement:     Critical care time (minutes):  60    Critical care was necessary to treat or prevent imminent or life-threatening  deterioration of the following conditions:  Respiratory failure and CNS failure or compromise    Critical care was time spent personally by me on the following activities:  Discussions with consultants, evaluation of patient's response to treatment, examination of patient, obtaining history from patient or surrogate, review of old charts, re-evaluation of patient's condition, pulse oximetry, ordering and review of radiographic studies, ordering and review of laboratory studies and ordering and performing treatments and interventions             ED Course                                               Recent Results (from the past 24 hours)   ECG 12 Lead Stroke Evaluation    Collection Time: 05/06/25  9:13 AM   Result Value Ref Range    QT Interval 400 ms    QTC Interval 508 ms   Lactic Acid, Plasma    Collection Time: 05/06/25  9:22 AM    Specimen: Blood   Result Value Ref Range    Lactate 2.5 (C) 0.5 - 2.0 mmol/L   Sedimentation Rate    Collection Time: 05/06/25  9:22 AM    Specimen: Blood   Result Value Ref Range    Sed Rate 88 (H) 0 - 30 mm/hr   CBC Auto Differential    Collection Time: 05/06/25  9:22 AM    Specimen: Blood   Result Value Ref Range    WBC 13.21 (H) 3.40 - 10.80 10*3/mm3    RBC 4.15 3.77 - 5.28 10*6/mm3    Hemoglobin 12.9 12.0 - 15.9 g/dL    Hematocrit 42.5 34.0 - 46.6 %    .4 (H) 79.0 - 97.0 fL    MCH 31.1 26.6 - 33.0 pg    MCHC 30.4 (L) 31.5 - 35.7 g/dL    RDW 13.3 12.3 - 15.4 %    RDW-SD 51.0 37.0 - 54.0 fl    MPV 10.6 6.0 - 12.0 fL    Platelets 245 140 - 450 10*3/mm3    Neutrophil % 86.6 (H) 42.7 - 76.0 %    Lymphocyte % 7.9 (L) 19.6 - 45.3 %    Monocyte % 4.5 (L) 5.0 - 12.0 %    Eosinophil % 0.0 (L) 0.3 - 6.2 %    Basophil % 0.3 0.0 - 1.5 %    Immature Grans % 0.7 (H) 0.0 - 0.5 %    Neutrophils, Absolute 11.45 (H) 1.70 - 7.00 10*3/mm3    Lymphocytes, Absolute 1.04 0.70 - 3.10 10*3/mm3    Monocytes, Absolute 0.59 0.10 - 0.90 10*3/mm3    Eosinophils, Absolute 0.00 0.00 - 0.40 10*3/mm3     Basophils, Absolute 0.04 0.00 - 0.20 10*3/mm3    Immature Grans, Absolute 0.09 (H) 0.00 - 0.05 10*3/mm3    nRBC 0.0 0.0 - 0.2 /100 WBC   Blood Gas, Arterial With Co-Ox    Collection Time: 05/06/25  9:22 AM    Specimen: Arterial Blood   Result Value Ref Range    Site Right Radial     Daniel's Test Positive     pH, Arterial 7.463 (H) 7.350 - 7.450 pH units    pCO2, Arterial 45.7 (H) 35.0 - 45.0 mm Hg    pO2, Arterial 76.4 (L) 83.0 - 108.0 mm Hg    HCO3, Arterial 32.7 (H) 20.0 - 26.0 mmol/L    Base Excess, Arterial 7.8 (H) 0.0 - 2.0 mmol/L    Hemoglobin, Blood Gas 12.3 (L) 14 - 18 g/dL    Hematocrit, Blood Gas 37.7 (L) 38.0 - 51.0 %    Oxyhemoglobin 94.3 94 - 99 %    Methemoglobin 0.20 0.00 - 1.50 %    Carboxyhemoglobin 2.6 (H) 0 - 2 %    CO2 Content 34.1 (H) 22 - 33 mmol/L    Temperature 37.0     Barometric Pressure for Blood Gas      Modality Nasal Cannula     FIO2 28 %    Rate 0 Breaths/minute    PIP 0 cmH2O    IPAP 0     EPAP 0     pH, Temp Corrected 7.463 pH Units    pCO2, Temperature Corrected 45.7 (H) 35 - 45 mm Hg    pO2, Temperature Corrected 76.4 (L) 83 - 108 mm Hg   Comprehensive Metabolic Panel    Collection Time: 05/06/25 10:01 AM    Specimen: Blood   Result Value Ref Range    Glucose 124 (H) 65 - 99 mg/dL    BUN 10 8 - 23 mg/dL    Creatinine 0.86 0.57 - 1.00 mg/dL    Sodium 143 136 - 145 mmol/L    Potassium 3.9 3.5 - 5.2 mmol/L    Chloride 104 98 - 107 mmol/L    CO2 29.0 22.0 - 29.0 mmol/L    Calcium 9.6 8.6 - 10.5 mg/dL    Total Protein 7.7 6.0 - 8.5 g/dL    Albumin 3.6 3.5 - 5.2 g/dL    ALT (SGPT) 19 1 - 33 U/L    AST (SGOT) 41 (H) 1 - 32 U/L    Alkaline Phosphatase 110 39 - 117 U/L    Total Bilirubin 0.2 0.0 - 1.2 mg/dL    Globulin 4.1 gm/dL    A/G Ratio 0.9 g/dL    BUN/Creatinine Ratio 11.6 7.0 - 25.0    Anion Gap 10.0 5.0 - 15.0 mmol/L    eGFR 74.6 >60.0 mL/min/1.73   BNP    Collection Time: 05/06/25 10:01 AM    Specimen: Blood   Result Value Ref Range    proBNP 502.9 0.0 - 900.0 pg/mL   D-dimer,  Quantitative    Collection Time: 05/06/25 10:01 AM    Specimen: Blood   Result Value Ref Range    D-Dimer, Quantitative 2.13 (H) 0.00 - 0.66 MCGFEU/mL   High Sensitivity Troponin T    Collection Time: 05/06/25 10:01 AM    Specimen: Blood   Result Value Ref Range    HS Troponin T 143 (C) <14 ng/L   Procalcitonin    Collection Time: 05/06/25 10:01 AM    Specimen: Blood   Result Value Ref Range    Procalcitonin 0.21 0.00 - 0.25 ng/mL   Acetaminophen Level    Collection Time: 05/06/25 10:01 AM    Specimen: Blood   Result Value Ref Range    Acetaminophen <5.0 0.0 - 30.0 mcg/mL   Ethanol    Collection Time: 05/06/25 10:01 AM    Specimen: Blood   Result Value Ref Range    Ethanol <10 0 - 10 mg/dL   Salicylate Level    Collection Time: 05/06/25 10:01 AM    Specimen: Blood   Result Value Ref Range    Salicylate <0.3 <=30.0 mg/dL   TSH    Collection Time: 05/06/25 10:01 AM    Specimen: Blood   Result Value Ref Range    TSH 1.010 0.270 - 4.200 uIU/mL   Magnesium    Collection Time: 05/06/25 10:01 AM    Specimen: Blood   Result Value Ref Range    Magnesium 2.5 (H) 1.6 - 2.4 mg/dL   Respiratory Panel PCR w/COVID-19(SARS-CoV-2) KATTY/ADAM/NIMA/PAD/COR/SHELBI In-House, NP Swab in UTM/VTM, 2 HR TAT - Swab, Nasopharynx    Collection Time: 05/06/25 10:09 AM    Specimen: Nasopharynx; Swab   Result Value Ref Range    ADENOVIRUS, PCR Not Detected Not Detected    Coronavirus 229E Not Detected Not Detected    Coronavirus HKU1 Not Detected Not Detected    Coronavirus NL63 Not Detected Not Detected    Coronavirus OC43 Not Detected Not Detected    COVID19 Not Detected Not Detected - Ref. Range    Human Metapneumovirus Not Detected Not Detected    Human Rhinovirus/Enterovirus Not Detected Not Detected    Influenza A PCR Not Detected Not Detected    Influenza B PCR Not Detected Not Detected    Parainfluenza Virus 1 Not Detected Not Detected    Parainfluenza Virus 2 Not Detected Not Detected    Parainfluenza Virus 3 Not Detected Not Detected     Parainfluenza Virus 4 Not Detected Not Detected    RSV, PCR Not Detected Not Detected    Bordetella pertussis pcr Not Detected Not Detected    Bordetella parapertussis PCR Not Detected Not Detected    Chlamydophila pneumoniae PCR Not Detected Not Detected    Mycoplasma pneumo by PCR Not Detected Not Detected   Urinalysis With Culture If Indicated - Urine, Catheter    Collection Time: 05/06/25 10:16 AM    Specimen: Urine, Catheter   Result Value Ref Range    Color, UA Yellow Yellow, Straw    Appearance, UA Clear Clear    pH, UA 7.0 5.0 - 8.0    Specific Gravity, UA 1.013 1.001 - 1.030    Glucose, UA Negative Negative    Ketones, UA Negative Negative    Bilirubin, UA Negative Negative    Blood, UA Negative Negative    Protein, UA Negative Negative    Leuk Esterase, UA Negative Negative    Nitrite, UA Negative Negative    Urobilinogen, UA 1.0 E.U./dL 0.2 - 1.0 E.U./dL   Urine Drug Screen - Urine, Catheter    Collection Time: 05/06/25 10:16 AM    Specimen: Urine, Catheter   Result Value Ref Range    THC, Screen, Urine Positive (A) Negative    Phencyclidine (PCP), Urine Negative Negative    Cocaine Screen, Urine Negative Negative    Methamphetamine, Ur Negative Negative    Opiate Screen Negative Negative    Amphetamine Screen, Urine Negative Negative    Benzodiazepine Screen, Urine Negative Negative    Tricyclic Antidepressants Screen Negative Negative    Methadone Screen, Urine Positive (A) Negative    Barbiturates Screen, Urine Negative Negative    Oxycodone Screen, Urine Negative Negative    Buprenorphine, Screen, Urine Negative Negative   Fentanyl, Urine - Urine, Catheter    Collection Time: 05/06/25 10:16 AM    Specimen: Urine, Catheter   Result Value Ref Range    Fentanyl, Urine Negative Negative   High Sensitivity Troponin T 1Hr    Collection Time: 05/06/25 11:24 AM    Specimen: Blood   Result Value Ref Range    HS Troponin T 149 (C) <14 ng/L    Troponin T Numeric Delta 6 ng/L    Troponin T % Delta 4 Abnormal if  >/= 20%     Note: In addition to lab results from this visit, the labs listed above may include labs taken at another facility or during a different encounter within the last 24 hours. Please correlate lab times with ED admission and discharge times for further clarification of the services performed during this visit.    CT Angiogram Chest   Final Result   Impression:   No evidence of pulmonary embolism.      New/increased in conspicuity compared to same day exam is bronchial wall thickening with scattered mucoid impaction within the bilateral lower lobes, with left basilar micronodularity as well as a small peripheral consolidation in the lingula. Findings    suspected to related to aspiration with subsequent endobronchial and downstream infection/inflammation.         Electronically Signed: Donato Ocampo MD     5/6/2025 12:27 PM EDT     Workstation ID: [id number]      CT Chest Without Contrast Diagnostic   Final Result   Impression:   No acute intrathoracic findings.      Motion-degraded exam.         Electronically Signed: Donato Ocampo MD     5/6/2025 9:13 AM EDT     Workstation ID: [id number]      CT Head Without Contrast   Final Result   Impression:   No acute intracranial findings are evident, within the confines of motion degradation.         Electronically Signed: Donato Ocampo MD     5/6/2025 9:17 AM EDT     Workstation ID: [id number]        Vitals:    05/06/25 1115 05/06/25 1130 05/06/25 1137 05/06/25 1230   BP:  (!) 84/42 99/63 99/62   Patient Position:       Pulse: 77 73 73 98   Resp:       Temp:       TempSrc:       SpO2: 92% 94%  100%   Weight:       Height:         Medications   sodium chloride 0.9 % infusion (125 mL/hr Intravenous New Bag 5/6/25 0929)   vancomycin IVPB 2000 mg in 0.9% Sodium Chloride 500 mL (2,000 mg Intravenous New Bag 5/6/25 1313)   sodium chloride 0.9 % flush 10 mL (10 mL Intravenous Given 5/6/25 1314)   sodium chloride 0.9 % flush 10 mL (has no administration in time range)   sodium  chloride 0.9 % infusion 40 mL (has no administration in time range)   mupirocin (BACTROBAN) 2 % nasal ointment 1 Application (1 Application Each Nare Given 5/6/25 1314)   sennosides-docusate (PERICOLACE) 8.6-50 MG per tablet 2 tablet (2 tablets Oral Not Given 5/6/25 1314)     And   polyethylene glycol (MIRALAX) packet 17 g (has no administration in time range)     And   bisacodyl (DULCOLAX) EC tablet 5 mg (has no administration in time range)     And   bisacodyl (DULCOLAX) suppository 10 mg (has no administration in time range)   enoxaparin sodium (LOVENOX) syringe 40 mg (40 mg Subcutaneous Given 5/6/25 1314)   Potassium Replacement - Follow Nurse / BPA Driven Protocol (has no administration in time range)   Magnesium Standard Dose Replacement - Follow Nurse / BPA Driven Protocol (has no administration in time range)   Phosphorus Replacement - Follow Nurse / BPA Driven Protocol (has no administration in time range)   Calcium Replacement - Follow Nurse / BPA Driven Protocol (has no administration in time range)   ipratropium-albuterol (DUO-NEB) nebulizer solution 3 mL (has no administration in time range)   piperacillin-tazobactam (ZOSYN) 3.375 g IVPB in 100 mL NS MBP (CD) (has no administration in time range)   haloperidol lactate (HALDOL) injection 2 mg (2 mg Intramuscular Given 5/6/25 0903)   thiamine (B-1) injection 100 mg (100 mg Intravenous Given 5/6/25 0929)   ipratropium-albuterol (DUO-NEB) nebulizer solution 3 mL (3 mL Nebulization Given 5/6/25 0919)   sodium chloride 0.9 % bolus 1,000 mL (1,000 mL Intravenous New Bag 5/6/25 1118)   methylPREDNISolone sodium succinate (SOLU-Medrol) injection 125 mg (125 mg Intravenous Given 5/6/25 1113)   piperacillin-tazobactam (ZOSYN) 3.375 g IVPB in 100 mL NS MBP (CD) (0 g Intravenous Stopped 5/6/25 1218)   iopamidol (ISOVUE-370) 76 % injection 75 mL (75 mL Intravenous Given 5/6/25 1200)     ECG/EMG Results (last 24 hours)       Procedure Component Value Units Date/Time     ECG 12 Lead Stroke Evaluation [[id number]] Collected: 05/06/25 0913     Updated: 05/06/25 0910     QT Interval 400 ms      QTC Interval 508 ms     Narrative:      Test Reason : Stroke Evaluation  Blood Pressure :   */*   mmHG  Vent. Rate :  97 BPM     Atrial Rate :  97 BPM     P-R Int : 168 ms          QRS Dur :  96 ms      QT Int : 400 ms       P-R-T Axes :  80  28  57 degrees    QTcB Int : 508 ms    Normal sinus rhythm  Prolonged QT  Abnormal ECG  When compared with ECG of 25-Dec-2013 04:53,  T wave inversion no longer evident in Anterior leads  QT has lengthened    Referred By: ANAHY           Confirmed By:           ECG 12 Lead Stroke Evaluation   Final Result   Test Reason : Stroke Evaluation   Blood Pressure :   */*   mmHG   Vent. Rate :  97 BPM     Atrial Rate :  97 BPM      P-R Int : 168 ms          QRS Dur :  96 ms       QT Int : 400 ms       P-R-T Axes :  80  28  57 degrees     QTcB Int : 508 ms      Normal sinus rhythm   Prolonged QT   Abnormal ECG   When compared with ECG of 25-Dec-2013 04:53,   T wave inversion no longer evident in Anterior leads   QT has lengthened   Confirmed by CHERYL MATOS MD ([id number]) on 5/6/2025 10:52:46 AM      Referred By: EDMD           Confirmed By: CHERYL MATOS MD               Medical Decision Making      I have reviewed all available studies and I personally and contemporaneously reviewed and interpreted the patient's head CT which shows nothing acute.  Also chest CT showing nothing acute.    The emergency department she received 2 mg of IV Haldol due to her agitation and on recheck she had improved dramatically was able to speak with me in complete sentences.  Her son was now at the bedside we talked at length he believes her mental status decline was due to taking her methadone at the same time as her gabapentin which she is on apparently 80 mg of methadone a day and 600 mg of the gabapentin 4 times a day.        Despite pulmonary toilet nebulizer treatments and the  patient's oxygen saturation continue to decline and she was maxed out on nasal cannula and we had a switch over to high flow nasal cannula.  She also developed some transient hypotension with her blood pressure going down to the lower 90s upper 80s which responded to fluid bolus.  Her mental status remained improved.  Her GCS when I reexamined her was 14 I did have to have her just open her eyes when I spoke with her.  I have reexamined her multiple times.    Sounds like patient just had a breast biopsy yesterday.    Her D-dimer was positive and I have ordered a CTA of her chest which is subsequently shown what appears to be a new pneumonia with her initial CT.  I suspect the patient is aspirated.  Given the hypotension and hypoxia have started on broad-spectrum antibiotics ordered blood cultures I spoke Dr. Oneill, on-call critical care medicine who admitted to the ICU for careful observations and ongoing treatment.  Likely altered mental status due to polypharmacy.        Problems Addressed:  Acute respiratory failure with hypoxia and hypercapnia: complicated acute illness or injury with systemic symptoms that poses a threat to life or bodily functions  Altered mental status, unspecified altered mental status type: complicated acute illness or injury with systemic symptoms that poses a threat to life or bodily functions  Hypotension, unspecified hypotension type: complicated acute illness or injury with systemic symptoms that poses a threat to life or bodily functions  Leukocytosis, unspecified type: complicated acute illness or injury with systemic symptoms that poses a threat to life or bodily functions    Amount and/or Complexity of Data Reviewed  Independent Historian:      Details: son  External Data Reviewed: notes.  Labs: ordered. Decision-making details documented in ED Course.  Radiology: ordered and independent interpretation performed. Decision-making details documented in ED Course.  ECG/medicine  tests: ordered and independent interpretation performed. Decision-making details documented in ED Course.    Risk  Prescription drug management.  Decision regarding hospitalization.        Final diagnoses:   Altered mental status, unspecified altered mental status type   Acute respiratory failure with hypoxia and hypercapnia   Leukocytosis, unspecified type   Hypotension, unspecified hypotension type       ED Disposition  ED Disposition       ED Disposition   Decision to Admit    Condition   --    Comment   Level of Care: Critical Care [6]   Admitting Physician: MARGARITA RANDHAWA [[id number]]   Attending Physician: MARGARITA RANDHAWA [[id number]]                 No follow-up provider specified.       Medication List      No changes were made to your prescriptions during this visit.            Luiz Valdes MD  05/06/25 [id number]

## 2025-05-06 NOTE — TELEPHONE ENCOUNTER
Attempted to contact patient with pathology results. Left voice mail message for patient to return call.

## 2025-05-06 NOTE — CASE MANAGEMENT/SOCIAL WORK
Discharge Planning Assessment  [facility]     Patient Name: Joy Thornton  MRN: [id number]  Today's Date: 5/6/2025    Admit Date: 5/6/2025    Plan: IDP   Discharge Needs Assessment       Row Name 05/06/25 1139       Living Environment    People in Home child(joan), adult    Name(s) of People in Home Joe Goodman    Current Living Arrangements apartment    Potentially Unsafe Housing Conditions unable to assess    In the past 12 months has the electric, gas, oil, or water company threatened to shut off services in your home? Pt Unable    Primary Care Provided by self    Provides Primary Care For no one    Family Caregiver if Needed child(joan), adult    Family Caregiver Names Joe Rhodesett    Quality of Family Relationships unable to assess    Able to Return to Prior Arrangements yes       Resource/Environmental Concerns    Transportation Concerns none       Transportation Needs    In the past 12 months, has lack of transportation kept you from medical appointments or from getting medications? Pt Unable    In the past 12 months, has lack of transportation kept you from meetings, work, or from getting things needed for daily living? Pt Unable       Food Insecurity    Within the past 12 months, you worried that your food would run out before you got the money to buy more. Pt Unable    Within the past 12 months, the food you bought just didn't last and you didn't have money to get more. Pt Unable       Transition Planning    Patient/Family Anticipates Transition to home with family    Transportation Anticipated family or friend will provide       Discharge Needs Assessment    Readmission Within the Last 30 Days no previous admission in last 30 days    Equipment Currently Used at Home cane, straight;walker, rolling    Do you want help finding or keeping work or a job? Patient unable to answer    Do you want help with school or training? For example, starting or completing job training or getting a high school  diploma, GED or equivalent Patient unable to answer                   Discharge Plan       Row Name 05/06/25 1141       Plan    Plan IDP    Plan Comments MSW attempted to meet with pt. at bedside, but she was altered. MSW spoke with pt.'s son Joe Goodman by phone. Pt. lives in [facility] and her son lives with her. Pt.'s PCP is Rose Mary Covington. Pt.'s insurance is [place] [employer]. Pt. is independent at baseline. Pt. uses a walker and cane. No O2, or HH currently. Pt. has transportation when medically ready to d/c.  Pt. doesn't have an advanced directive or ACP documentation on file. CM will continue to follow pt. throughout her stay.                       Demographic Summary       Row Name 05/06/25 1138       General Information    Arrived From home    Referral Source admission list;emergency department    Reason for Consult discharge planning    Preferred Language English                   Functional Status       Row Name 05/06/25 1138       Physical Activity    On average, how many days per week do you engage in moderate to strenuous exercise (like a brisk walk)? Pt Unable    On average, how many minutes do you engage in exercise at this level? Pt Unable       Functional Status, IADL    Medications independent    Meal Preparation independent    Housekeeping independent    Laundry independent    Shopping independent    If for any reason you need help with day-to-day activities such as bathing, preparing meals, shopping, managing finances, etc., do you get the help you need? Patient unable to answer       Mental Status Summary    Recent Changes in Mental Status/Cognitive Functioning unable to assess       Employment/    Employment Status employed full-time                   Psychosocial       Row Name 05/06/25 1139       Mental Health    Why did the patient not complete the Depression Screen questions? Patient unable to answer       Stress    Do you feel stress - tense, restless, nervous, or  anxious, or unable to sleep at night because your mind is troubled all the time - these days? Pt Unable                   Abuse/Neglect    No documentation.                  Legal       Row Name 05/06/25 1139       Financial Resource Strain    How hard is it for you to pay for the very basics like food, housing, medical care, and heating? Pt Unable                   Substance Abuse    No documentation.                  Patient Forms    No documentation.                     MARIE Carrillo

## 2025-05-07 ENCOUNTER — TELEPHONE (OUTPATIENT)
Facility: HOSPITAL | Age: 65
End: 2025-05-07
Payer: MEDICARE

## 2025-05-07 ENCOUNTER — READMISSION MANAGEMENT (OUTPATIENT)
Dept: CALL CENTER | Facility: HOSPITAL | Age: 65
End: 2025-05-07
Payer: MEDICARE

## 2025-05-07 VITALS
RESPIRATION RATE: 20 BRPM | DIASTOLIC BLOOD PRESSURE: 50 MMHG | BODY MASS INDEX: 38.41 KG/M2 | HEIGHT: 64 IN | WEIGHT: 225 LBS | SYSTOLIC BLOOD PRESSURE: 111 MMHG | HEART RATE: 80 BPM | OXYGEN SATURATION: 93 % | TEMPERATURE: 98.3 F

## 2025-05-07 PROBLEM — R41.82 AMS (ALTERED MENTAL STATUS): Status: RESOLVED | Noted: 2025-05-06 | Resolved: 2025-05-07

## 2025-05-07 PROBLEM — J96.01 ACUTE RESPIRATORY FAILURE WITH HYPOXIA: Status: RESOLVED | Noted: 2025-05-06 | Resolved: 2025-05-07

## 2025-05-07 LAB
ANION GAP SERPL CALCULATED.3IONS-SCNC: 10 MMOL/L (ref 5–15)
BASOPHILS # BLD MANUAL: 0 10*3/MM3 (ref 0–0.2)
BASOPHILS NFR BLD MANUAL: 0 % (ref 0–1.5)
BUN SERPL-MCNC: 10 MG/DL (ref 8–23)
BUN/CREAT SERPL: 16.9 (ref 7–25)
CALCIUM SPEC-SCNC: 8.4 MG/DL (ref 8.6–10.5)
CHLORIDE SERPL-SCNC: 105 MMOL/L (ref 98–107)
CO2 SERPL-SCNC: 25 MMOL/L (ref 22–29)
CREAT SERPL-MCNC: 0.59 MG/DL (ref 0.57–1)
DEPRECATED RDW RBC AUTO: 49.1 FL (ref 37–54)
EGFRCR SERPLBLD CKD-EPI 2021: 99.5 ML/MIN/1.73
EOSINOPHIL # BLD MANUAL: 0 10*3/MM3 (ref 0–0.4)
EOSINOPHIL NFR BLD MANUAL: 0 % (ref 0.3–6.2)
ERYTHROCYTE [DISTWIDTH] IN BLOOD BY AUTOMATED COUNT: 13.2 % (ref 12.3–15.4)
GLUCOSE SERPL-MCNC: 105 MG/DL (ref 65–99)
HCT VFR BLD AUTO: 37.6 % (ref 34–46.6)
HGB BLD-MCNC: 11.6 G/DL (ref 12–15.9)
LYMPHOCYTES # BLD MANUAL: 1.28 10*3/MM3 (ref 0.7–3.1)
LYMPHOCYTES NFR BLD MANUAL: 5 % (ref 5–12)
MACROCYTES BLD QL SMEAR: ABNORMAL
MAGNESIUM SERPL-MCNC: 2 MG/DL (ref 1.6–2.4)
MCH RBC QN AUTO: 30.9 PG (ref 26.6–33)
MCHC RBC AUTO-ENTMCNC: 30.9 G/DL (ref 31.5–35.7)
MCV RBC AUTO: 100.3 FL (ref 79–97)
MONOCYTES # BLD: 0.64 10*3/MM3 (ref 0.1–0.9)
NEUTROPHILS # BLD AUTO: 10.91 10*3/MM3 (ref 1.7–7)
NEUTROPHILS NFR BLD MANUAL: 84 % (ref 42.7–76)
NEUTS BAND NFR BLD MANUAL: 1 % (ref 0–5)
NRBC SPEC MANUAL: 0 /100 WBC (ref 0–0.2)
PHOSPHATE SERPL-MCNC: 3 MG/DL (ref 2.5–4.5)
PLAT MORPH BLD: NORMAL
PLATELET # BLD AUTO: 183 10*3/MM3 (ref 140–450)
PMV BLD AUTO: 11.8 FL (ref 6–12)
POTASSIUM SERPL-SCNC: 4.4 MMOL/L (ref 3.5–5.2)
RBC # BLD AUTO: 3.75 10*6/MM3 (ref 3.77–5.28)
SODIUM SERPL-SCNC: 140 MMOL/L (ref 136–145)
VARIANT LYMPHS NFR BLD MANUAL: 10 % (ref 19.6–45.3)
WBC MORPH BLD: NORMAL
WBC NRBC COR # BLD AUTO: 12.83 10*3/MM3 (ref 3.4–10.8)

## 2025-05-07 PROCEDURE — 80048 BASIC METABOLIC PNL TOTAL CA: CPT | Performed by: NURSE PRACTITIONER

## 2025-05-07 PROCEDURE — 84100 ASSAY OF PHOSPHORUS: CPT | Performed by: NURSE PRACTITIONER

## 2025-05-07 PROCEDURE — 99239 HOSP IP/OBS DSCHRG MGMT >30: CPT

## 2025-05-07 PROCEDURE — 85007 BL SMEAR W/DIFF WBC COUNT: CPT | Performed by: NURSE PRACTITIONER

## 2025-05-07 PROCEDURE — 83735 ASSAY OF MAGNESIUM: CPT | Performed by: NURSE PRACTITIONER

## 2025-05-07 PROCEDURE — 85027 COMPLETE CBC AUTOMATED: CPT | Performed by: NURSE PRACTITIONER

## 2025-05-07 PROCEDURE — 97165 OT EVAL LOW COMPLEX 30 MIN: CPT

## 2025-05-07 PROCEDURE — 94799 UNLISTED PULMONARY SVC/PX: CPT

## 2025-05-07 RX ORDER — AZITHROMYCIN 250 MG/1
250 TABLET, FILM COATED ORAL
Qty: 3 TABLET | Refills: 0 | Status: SHIPPED | OUTPATIENT
Start: 2025-05-08 | End: 2025-05-11

## 2025-05-07 RX ORDER — NICOTINE 21 MG/24HR
1 PATCH, TRANSDERMAL 24 HOURS TRANSDERMAL
Qty: 30 PATCH | Refills: 1 | Status: SHIPPED | OUTPATIENT
Start: 2025-05-07 | End: 2025-05-07

## 2025-05-07 RX ORDER — POLYETHYLENE GLYCOL 3350 17 G
2 POWDER IN PACKET (EA) ORAL
Qty: 108 EACH | Refills: 1 | Status: SHIPPED | OUTPATIENT
Start: 2025-05-07 | End: 2025-05-07

## 2025-05-07 RX ORDER — ALBUTEROL SULFATE 0.83 MG/ML
2.5 SOLUTION RESPIRATORY (INHALATION) EVERY 4 HOURS PRN
Qty: 90 ML | Refills: 2 | Status: SHIPPED | OUTPATIENT
Start: 2025-05-07 | End: 2025-06-06

## 2025-05-07 RX ORDER — FLUTICASONE FUROATE, UMECLIDINIUM BROMIDE AND VILANTEROL TRIFENATATE 100; 62.5; 25 UG/1; UG/1; UG/1
1 POWDER RESPIRATORY (INHALATION)
Qty: 60 EACH | Refills: 1 | Status: SHIPPED | OUTPATIENT
Start: 2025-05-07

## 2025-05-07 RX ORDER — ALBUTEROL SULFATE 0.83 MG/ML
2.5 SOLUTION RESPIRATORY (INHALATION) EVERY 4 HOURS PRN
Qty: 90 ML | Refills: 2 | Status: SHIPPED | OUTPATIENT
Start: 2025-05-07 | End: 2025-05-07

## 2025-05-07 RX ORDER — FLUTICASONE FUROATE, UMECLIDINIUM BROMIDE AND VILANTEROL TRIFENATATE 100; 62.5; 25 UG/1; UG/1; UG/1
1 POWDER RESPIRATORY (INHALATION)
Qty: 60 EACH | Refills: 1 | Status: SHIPPED | OUTPATIENT
Start: 2025-05-07 | End: 2025-05-07

## 2025-05-07 RX ORDER — AZITHROMYCIN 250 MG/1
250 TABLET, FILM COATED ORAL
Qty: 3 TABLET | Refills: 0 | Status: SHIPPED | OUTPATIENT
Start: 2025-05-08 | End: 2025-05-07

## 2025-05-07 RX ORDER — NICOTINE 21 MG/24HR
1 PATCH, TRANSDERMAL 24 HOURS TRANSDERMAL
Qty: 30 PATCH | Refills: 1 | Status: SHIPPED | OUTPATIENT
Start: 2025-05-07

## 2025-05-07 RX ORDER — POLYETHYLENE GLYCOL 3350 17 G
2 POWDER IN PACKET (EA) ORAL
Qty: 108 EACH | Refills: 1 | Status: SHIPPED | OUTPATIENT
Start: 2025-05-07

## 2025-05-07 RX ADMIN — Medication 10 ML: at 08:32

## 2025-05-07 RX ADMIN — SODIUM CHLORIDE SOLN NEBU 7% 4 ML: 7 NEBU SOLN at 08:54

## 2025-05-07 RX ADMIN — BUDESONIDE 0.5 MG: 0.5 SUSPENSION RESPIRATORY (INHALATION) at 08:54

## 2025-05-07 RX ADMIN — IPRATROPIUM BROMIDE AND ALBUTEROL SULFATE 3 ML: 2.5; .5 SOLUTION RESPIRATORY (INHALATION) at 08:54

## 2025-05-07 RX ADMIN — AZITHROMYCIN DIHYDRATE 250 MG: 250 TABLET ORAL at 08:32

## 2025-05-07 NOTE — CASE MANAGEMENT/SOCIAL WORK
Continued Stay Note  [facility]     Patient Name: Joy Thornton  MRN: [id number]  Today's Date: 5/7/2025    Admit Date: 5/6/2025    Plan: Home with family   Discharge Plan       Row Name 05/07/25 1104       Plan    Plan Home with family    Patient/Family in Agreement with Plan yes    Plan Comments CM ordered a new nebulizer of the patient from [facility] at [facility]. [employer] will deliver to the patient's room. CM will continue to follow.    Final Discharge Disposition Code 01 - home or self-care      Row Name 05/07/25 0821       Plan    Plan Home with family    Patient/Family in Agreement with Plan yes    Plan Comments Spoke with patient at bedside. Lives withson, Son will transport. Pt denies any discharge needs at this time. CM will continue to follow.    Final Discharge Disposition Code 01 - home or self-care                   Discharge Codes    No documentation.                       Tommy Mahoney RN

## 2025-05-07 NOTE — CASE MANAGEMENT/SOCIAL WORK
Continued Stay Note  [facility]     Patient Name: Joy Thornton  MRN: [id number]  Today's Date: 5/7/2025    Admit Date: 5/6/2025    Plan: Return to [facility] for Methadone   Discharge Plan       Row Name 05/07/25 1107       Plan    Plan Return to [facility] for Methadone    Plan Comments Chief Complaint:        Chief Complaint   Patient presents with    Altered Mental Status         Subjective  HPI: -  copied from ANGELA Robles's note dated 05/06/25     Ms. Thornton is a 64 year old female (smoker) with pmhx of COPD and chronic pain who presented to [facility] ED on 5/6/25 via EMS after being found with altered mental status by roommate. Patient was poorly responsive upon EMS arrival and was given Narcan 4 mg x 1 without significant improvement. She was placed on 2L NC and transported to [facility]. Upon arrival here, she remained altered and became more hypoxic requiring HFNC. CT H and CT chest were unremarkable. ProCal, respiratory panel also unremarkable. Lactate 2.5, troponin mildly elevated, ABG- 7/463/45.7/76.4/32.7.  UDS positive for THC & methadone.      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 Patient was admitted to the ICU for higher level of care. Upon arrival to the ICU, she is requesting to leave AMA. She is disoriented to place, time and situation. She believes she was given an unknown substance, at an unknown location by someone she doesn't know.     UDS - Methadone, THC    Spoke to patient at bedside. Patient stated that she took a THC gummy on Monday that she got from the store. She also stated that she has been going to the Methadone Clinic for three and a half years now. Her current dose is 105mg.     She stated that she had a knee replacement in 2010 and was prescribed pain medication that she eventually became addicted to. She claims that other than the THC she has not used any illicit substances since starting  at the clinic.    Patient will return to the clinic upon discharge.      Row Name 05/07/25 1104       Plan    Plan Home with family    Patient/Family in Agreement with Plan yes    Plan Comments CM ordered a new nebulizer of the patient from [employer] at [employer]. [employer] will deliver to the patient's room. CM will continue to follow.    Final Discharge Disposition Code 01 - home or self-care      Row Name 05/07/25 0821       Plan    Plan Home with family    Patient/Family in Agreement with Plan yes    Plan Comments Spoke with patient at bedside. Lives withson, Son will transport. Pt denies any discharge needs at this time. CM will continue to follow.    Final Discharge Disposition Code 01 - home or self-care                   Discharge Codes    No documentation.                       Yolanda Concepcion    Addiction Treatment Navigator  Ext. [phone]

## 2025-05-07 NOTE — DISCHARGE SUMMARY
Discharge Summary    Patient name: Joy Thornton  CSN: [id number]  MRN: [id number]  : 1960  Today's date: 2025     Date of Admission: 2025  Date of Discharge:  2025    Admitting Physician:  Dr. Nash Robles MD; Intensivist  Primary Care Provider: Rose Mary Covington MD  Consultations: N/A    Admission Diagnosis: Altered mental status     Discharge Diagnoses:   Active Hospital Problems    Diagnosis     Chronic pain syndrome     COPD (chronic obstructive pulmonary disease)     Tobacco use      Allergies:  Patient has no known allergies.    Code Status and Medical Interventions: CPR (Attempt to Resuscitate); Full Support   Ordered at: 25 1443     Code Status (Patient has no pulse and is not breathing):    CPR (Attempt to Resuscitate)     Medical Interventions (Patient has pulse or is breathing):    Full Support     Procedures/Testing:  CT CHEST WO CONTRAST DIAGNOSTIC  Date of Exam: 2025 8:59 AM EDT  Indication: uncon.  Comparison: None available.     Technique: Axial CT images were obtained of the chest without contrast administration.  Reconstructed coronal and sagittal images were also obtained. Automated exposure control and iterative construction methods were used.     Findings:  Motion-degraded exam.     Cardiovascular: No pericardial effusion. Normal caliber mildly atherosclerotic thoracic aorta. Difficult to reliably assess for coronary calcifications and motion artifact.     Lymph nodes and mediastinum: No lymphadenopathy or mass.     Lungs and airways: Central airways are patent. No suspicious pulmonary nodules or masses. Punctate benign calcified granuloma in the left upper lobe. No focal consolidation.     Pleura: No pleural effusion or pneumothorax.     Bones and soft tissues: Thoracic spondylosis. Suspected incidental vertebral body hemangioma at T11. No acute osseous abnormality. Soft tissues are within normal limits.     Upper abdomen: Cholecystectomy.      IMPRESSION:  Impression:  No acute intrathoracic findings.  Motion-degraded exam.     Electronically Signed: Donato Ocampo MD    5/6/2025 9:13 AM EDT    Workstation ID: [id number]       CT HEAD WO CONTRAST  Date of Exam: 5/6/2025 8:52 AM EDT  Indication: uncon.  Comparison: None available.  Technique: Axial CT images were obtained of the head without contrast administration.  Automated exposure control and iterative construction methods were used.     Findings:  Motion-degraded exam.     No evidence of acute intracranial hemorrhage or mass effect. No extra-axial collection. The gray white matter differentiation is preserved. Ventricles and sulci are symmetric. The mastoid air cells and paranasal sinuses are well aerated. Globes and   extraocular muscles are unremarkable. No acute or suspicious osseous abnormality. Soft tissues within normal limits.     IMPRESSION:  Impression:  No acute intracranial findings are evident, within the confines of motion degradation.     Electronically Signed: Donato Ocampo MD   5/6/2025 9:17 AM EDT    Workstation ID: [id number]      CT ANGIOGRAM CHEST  Date of Exam: 5/6/2025 11:46 AM EDT  Indication: hypoxia, + d dimer, eval for pe.  Comparison: Same day noncontrast chest CT.     Technique: CTA of the chest was performed after the uneventful intravenous administration of 75 mL Isovue-370. Reconstructed coronal and sagittal images were also obtained. In addition, a 3-D volume rendered image was created for interpretation.   Automated exposure control and iterative reconstruction methods were used.     Findings:  Motion-degraded exam.     Pulmonary arteries / cardiovascular: No intraluminal filling defect to suggest pulmonary embolus. No pericardial effusion. Normal caliber mildly atherosclerotic thoracic aorta.     Lymph nodes and mediastinum: No lymphadenopathy or mass.     Lungs and airways: Bronchial wall thickening with scattered mucoid impaction within the bilateral lower lobes, left  greater than right, with subtle micronodularity in the left lower lobe as well as small peripheral consolidation in the lingula; findings   are increased conspicuity compared to same day exam, possibly partly related to less motion artifact on this exam, though findings do appear truly new/worsened.     Pleura: No pleural effusion or pneumothorax.     Bones and soft tissues: Thoracic spondylosis. Suspected incidental vertebral body hemangioma at T11. No acute or suspicious osseous abnormality. Soft tissues are within normal limits.     Upper abdomen: Cholecystectomy.     IMPRESSION:  Impression:  No evidence of pulmonary embolism.     New/increased in conspicuity compared to same day exam is bronchial wall thickening with scattered mucoid impaction within the bilateral lower lobes, with left basilar micronodularity as well as a small peripheral consolidation in the lingula. Findings   suspected to related to aspiration with subsequent endobronchial and downstream infection/inflammation.     Electronically Signed: Donato Ocampo MD   5/6/2025 12:27 PM EDT   Workstation ID: [id number]        History of Present Illness:  Joy Thornton is a 64 y.o. female, current smoker, with PMHx COPD and chronic pain (on methadone and gabapentin at home) who presented to [facility] ED on 5/6/2025 via EMS after being found with altered mental status by her son, who was not able to wake her up. Per EMS report, she was poorly responsive on their arrival and she was given Narcan 4 mg x 1 without significant improvement. She was placed on 2L nasal cannula oxygen and transported to [facility].     Hospital Course:  On arrival to the ED, she remained altered and became more hypoxic, requiring HFNC. CT head and CT chest were unremarkable, with the exception of an incidental 5-mm RLL nodule. UDS was positive for methadone and THC. 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 She reported taking a THC gummy.     She  "was admitted to the ICU given altered mental status and oxygen requirements. She initially wished to leave [facility]; however, she was deemed to be disoriented and she ultimately elected to stay. She reports being a smoker since age 16 and was also initially treated for COPD exacerbation with azithromycin , budesonide, and albuterol. Patient does have an albuterol inhaler at home but denies frequent use. During her short hospital course, she was quickly weaned from HFNC to 1L nasal cannula oxygen. She was back to her mental baseline and tolerating a PO diet. A walking oximetry test was performed prior to discharge with OT and saturations remained above 90%. She does not qualify for home oxygen. She will be discharged home on Trelegy. A new nebulizer and medications have been prescribed.    She was seen by [facility] Dependency with referral to restart home methadone at half dose and followup with methadone clinic.    As of 5/7/2025, she was deemed appropriate for discharge. Discharge instructions below.    Vitals:  /52   Pulse 58   Temp 98.3 °F (36.8 °C) (Axillary)   Resp 18   Ht 162.6 cm (64\")   Wt 102 kg (225 lb)   SpO2 100%   BMI 38.62 kg/m²     Physical Exam:  Constitutional:  Appears well-developed and well-nourished. No distress.   HEENT:  Normocephalic and atraumatic. PERRL  Neck:  Neck supple. No JVD present.   CV: Normal rate, regular rhythm,  intact distal pulses.  No gallop, murmur or rub.  Pulmonary/Chest: Effort normal and breath sounds normal. No respiratory distress. No wheezes, rhonchi or rales.   Abdominal: Soft. +BS. No distension and no mass. There is no tenderness.   Musculoskeletal: Normal muscle tone and strength  Neurological: Alert and oriented to person, place, and time.  No focal deficits  Skin: Skin is warm and dry. No rash noted.   Extremities:  No clubbing, edema or cyanosis  Psychiatric: Normal mood and affect. Behavior is normal.     Labs:  Results from last 7 days   Lab Units " 05/07/25  0306   WBC 10*3/mm3 12.83*   HEMOGLOBIN g/dL 11.6*   HEMATOCRIT % 37.6   PLATELETS 10*3/mm3 183     Results from last 7 days   Lab Units 05/07/25  0306 05/06/25  1001   SODIUM mmol/L 140 143   POTASSIUM mmol/L 4.4 3.9   CHLORIDE mmol/L 105 104   CO2 mmol/L 25.0 29.0   BUN mg/dL 10 10   CREATININE mg/dL 0.59 0.86   CALCIUM mg/dL 8.4* 9.6   BILIRUBIN mg/dL  --  0.2   ALK PHOS U/L  --  110   ALT (SGPT) U/L  --  19   AST (SGOT) U/L  --  41*   GLUCOSE mg/dL 105* 124*         Magnesium   Date Value Ref Range Status   05/07/2025 2.0 1.6 - 2.4 mg/dL Final   05/06/2025 2.5 (H) 1.6 - 2.4 mg/dL Final     Phosphorus   Date Value Ref Range Status   05/07/2025 3.0 2.5 - 4.5 mg/dL Final                    Discharge Medications        New Medications        Instructions Start Date   albuterol (2.5 MG/3ML) 0.083% nebulizer solution  Commonly known as: PROVENTIL   2.5 mg, Nebulization, Every 4 Hours PRN      azithromycin 250 MG tablet  Commonly known as: ZITHROMAX   250 mg, Oral, Every 24 Hours Scheduled   Start Date: May 8, 2025     nicotine 21 MG/24HR patch  Commonly known as: NICODERM CQ   1 patch, Transdermal, Every 24 Hours Scheduled      nicotine polacrilex 2 MG lozenge  Commonly known as: COMMIT   2 mg, Mouth/Throat, Every 2 Hours PRN      Trelegy Ellipta 100-62.5-25 MCG/ACT inhaler  Generic drug: Fluticasone-Umeclidin-Vilant   1 puff, Inhalation, Daily - RT             Continue These Medications        Instructions Start Date   gabapentin 600 MG tablet  Commonly known as: NEURONTIN   600 mg, Oral, 4 Times Daily      methadone 5 MG/5ML solution  Commonly known as: DOLOPHINE   105 mg, Oral, Daily             Follow-up Appointments  Follow up with PCP in 1 week  Will need 3-month CT scan for follow up on RLL pulmonary nodule    Discharge Instructions:  Discharge home today  Transportation per family  Follow up with PCP. Would recommend 3- month follow-up CT for incidental 5-mm RLL pulmonary nodule  Medications as  above  Restart home methadone at HALF DOSE until follow up with methadone clinic                                                                                                                                                        Return to the hospital or call 911 with recurrence of symptoms     Yani Scott, MSN, APRN, ACNPC-AG  Pulmonary and Critical Care Medicine  Electronically signed by ALINA Ramos, 05/07/25, 9:46 AM EDT.     Time: I spent 33 minutes on this discharge activity which included: face-to-face encounter with the patient, reviewing the data in the system, coordination of the care with the nursing staff as well as consultants, documentation, and entering orders.      CC: Rose Mary Covington MD

## 2025-05-07 NOTE — CASE MANAGEMENT/SOCIAL WORK
"Continued Stay Note  [facility]     Patient Name: Joy Thornton  MRN: [id number]  Today's Date: 5/7/2025    Admit Date: 5/6/2025    Plan: Recommend restart of Methadone at half dose   Discharge Plan       Row Name 05/07/25 1124       Plan    Plan Recommend restart of Methadone at half dose    Plan Comments Consult received- seen and evaluated by Yolanda Concepcion.      Pt reports that she did take a \"gummy\" from her work at [employer].  Does not endorse any other illicit drug use.  UDS + Methadone (prescribed), THC.  She has been at the Methadone clinic- [facility] for 3 .5 years and has been compliant, she is now on \"take home\" dosing- this process of take home dosing, only occurs with complaint patients.    Daily Methadone dose is 105 mg.    There is no evidence that the patient is being noncompliant with her Methadone dosing.      She is alert and oriented at this time, was ambulating with PT this morning.      I would recommend a restart of her Methadone- could restart at half the dose to be safe, given her recent AMS/encephalopathy.      Row Name 05/07/25 1107       Plan    Plan Return to [facility] for Methadone    Plan Comments ,      Row Name 05/07/25 1104       Plan    Plan Home with family    Patient/Family in Agreement with Plan yes    Plan Comments CM ordered a new nebulizer of the patient from [employer]. [employer] will deliver to the patient's room. CM will continue to follow.    Final Discharge Disposition Code 01 - home or self-care                   Discharge Codes    No documentation.                       Nataliya Shafer RN MA,BSN-  Addiction Medicine     "

## 2025-05-07 NOTE — TELEPHONE ENCOUNTER
"  Returned patient call, notified  of pathology results and recommendation. Verbalizes understanding. States she continues to be in [facility] ICU and planning to be discharged home later today. States she feels \"great!\". Denies discomfort. Denies signs and symptoms of infection.     Patient notified of surgical consult appointment with Dr Nick Goncalves and details as above, including  office contact & location information. Patient told she will receive an information packet from [facility] with detailed location instructions. Patient verbalized understanding.     Reviewed what would be discussed at surgical consult visit, including detailed explanation of pathology report & imaging reports; treatment options & pros/cons, availability of breast nurse navigator. Patient encouraged to contact surgeon's office with questions or concerns. Breast cancer information packet offered and accepted. Patient verbalized understanding. Patient information sent to breast cancer nurse navigators for evaluation. Referring provider's office aware patient has been notified, of above information (spoke with Ilana).   "

## 2025-05-07 NOTE — PROGRESS NOTES
Doing well.  Weaned down to 1 L oxygen but SpO2 is 96% and therefore can be weaned off completely.  Will perform a walking oximetry and discharge home with long-acting bronchodilators such as Trelegy or Symbicort in addition to her albuterol. 5 days of Z-Chaitanya. No steroid.  Counseled against drug abuse.

## 2025-05-07 NOTE — DISCHARGE INSTR - APPOINTMENTS
Surgical consult for breast biopsy with Dr Goncalves on May 15th at 10:15 AM. Please arrive at 10:00 AM. Located at the [facility] at [facility]. [address] [facility] [id number].

## 2025-05-07 NOTE — PLAN OF CARE
Goal Outcome Evaluation:  Plan of Care Reviewed With: patient        Progress: no change  Outcome Evaluation: OT evaluation completed. Pt was able to peform bed mobility and functional mobility, walking greater than household distance with CGA, progressing to SBA and use of Hurrycane. Pt on room air throughout session. Pt's SPO2 did briefly drop to 85%, however, after 5 second standing rest break, returned to and remained >90%. Pt presents with generalized weakness, decreased activity tolerance, and decreased independence with ADLs and fxnl mobility and would benefit from skilled OT services to improve fxnl status. OT recommends home with assist and home health at discharge.    Anticipated Discharge Disposition (OT): home with assist, home with home health

## 2025-05-07 NOTE — PAYOR COMM NOTE
"Ref# [id number]    NATASHA Landeros, RN  Utilization Review  Phone [phone]  Fax [phone]    [place] [address]       Robb Thornton (64 y.o. Female)       Date of Birth   1960    Social Security Number       Address   [address] [place] [address]    Home Phone   [phone]    MRN   [id number]       Hinduism   Mormon    Marital Status                               Admission Date   5/6/2025    Admission Type   Emergency    Admitting Provider   Dung Oneill MD    Attending Provider   Dung Oneill MD    Department, Room/Bed   Psychiatric 2B ICU, N224/1       Discharge Date       Discharge Disposition       Discharge Destination                                 Attending Provider: Dung Oneill MD    Allergies: No Known Allergies    Isolation: None   Infection: None   Code Status: CPR    Ht: 162.6 cm (64\")   Wt: 102 kg (225 lb)    Admission Cmt: None   Principal Problem: AMS (altered mental status) [R41.82]                   Active Insurance as of 5/6/2025       Primary Coverage       Payor Plan Insurance Group Employer/Plan Group    ANTHEM MEDICARE REPLACEMENT [facility] MEDICARE ADVANTAGE O [id number]       Payor Plan Address Payor Plan Phone Number Payor Plan Fax Number Effective Dates    [address] [phone]  4/1/2025 - None Entered    [facility] [id number]         Subscriber Name Subscriber Birth Date Member ID       ROBB THORNTON 1960 [id number]                     Emergency Contacts        (Rel.) Home Phone Work Phone Mobile Phone    Joe Goodman (Son) [phone] -- [phone]              [place]: NPI [id number] Tax ID [id number]  Insurance Information                  ANTHEM MEDICARE REPLACEMENT/ANTHEM MEDICARE ADVANTAGE HMO Phone: [phone]    Subscriber: Robb Thornton Subscriber#: [id number]    Group#: [id number] Precert#: --    " Authorization#: -- Effective Date: --          Problem List           Codes Noted - Resolved       Hospital    * (Principal) AMS (altered mental status) ICD-10-CM: R41.82  ICD-9-CM: 780.97 5/6/2025 - Present    Acute respiratory failure with hypoxia ICD-10-CM: J96.01  ICD-9-CM: 518.81 5/6/2025 - Present    Chronic pain syndrome ICD-10-CM: G89.4  ICD-9-CM: 338.4 5/6/2025 - Present    COPD (chronic obstructive pulmonary disease) ICD-10-CM: J44.9  ICD-9-CM: 496 5/6/2025 - Present    Tobacco use ICD-10-CM: Z72.0  ICD-9-CM: 305.1 5/6/2025 - Present       Non-Hospital    Malignant neoplasm of female breast ICD-10-CM: C50.919  ICD-9-CM: 174.9 5/6/2025 - Present        Emergency Department Notes        Luiz Valdes MD at 05/06/25 0909        Procedure Orders    1. Critical Care [[id number]] ordered by Luiz Valdes MD                 Subjective   History of Present Illness  This is a 64-year-old female brought to the emergency department today by [facility] for initial callout of altered mental status.  Apparently is a roommate that called.  Patient himself is unable to give history.  Last known normal 10 hours ago per EMS.  When they arrived patient was poorly responsive GCS was 10.  There was some question whether she had been on methadone she was given Narcan 4 mg without any improvement.  She was transported here on 2 L nasal cannula her initial sat was 90% on room air.  Apparently the patient was initially answering some simple questions.    No mention by paramedics of any substances laying around the apartment.    They said she works at a place called [employer] and may experiment with some new products when they arrive there.  Unclear if she did so or not.    Review of the chart shows looks like she had a breast biopsy yesterday at [facility].    Fingerstick blood sugar was elevated per EMS greater than 480.    Really could not obtain any other history or review of systems.  I have reviewed the  chart.  No frequent ED visits.        Review of Systems   Unable to perform ROS: Mental status change       Past Medical History:   Diagnosis Date    Malignant neoplasm of female breast 5/6/2025       No Known Allergies    No past surgical history on file.    Family History   Problem Relation Age of Onset    Ovarian cancer Mother 46    No Known Problems Father     No Known Problems Sister     No Known Problems Brother     No Known Problems Daughter     No Known Problems Son     No Known Problems Maternal Grandmother     No Known Problems Paternal Grandmother     No Known Problems Maternal Aunt     No Known Problems Paternal Aunt     BRCA 1/2 Neg Hx     Breast cancer Neg Hx     Colon cancer Neg Hx     Endometrial cancer Neg Hx        Social History     Socioeconomic History    Marital status:            Objective   Physical Exam  Vitals and nursing note reviewed. Exam conducted with a chaperone present.   Constitutional:       Comments: Is a 64-year-old who is seen in the CT scanner she is alert but combative she is maintaining her airway she has some congestion.  She will occasionally scream out.  She will not open her eyes spontaneously.  GCS is 9.   HENT:      Head: Normocephalic and atraumatic.      Right Ear: External ear normal.      Left Ear: External ear normal.      Nose: Nose normal.      Mouth/Throat:      Mouth: Mucous membranes are moist.      Pharynx: Oropharynx is clear.   Eyes:      Comments: Her pupils are little dilated probably 6 mm react difficult to assess extraocular movements but she seems to look in all directions.   Neck:      Vascular: No carotid bruit.   Cardiovascular:      Rate and Rhythm: Normal rate and regular rhythm.      Pulses: Normal pulses.      Heart sounds: Normal heart sounds.   Pulmonary:      Comments: Rhonchorous lung sounds bilaterally.  Abdominal:      Comments: BMI 38 soft and nontender without organomegaly, masses, or guarding.   Musculoskeletal:      Cervical  back: Normal range of motion and neck supple. No rigidity or tenderness.      Comments: Equal full pulses in all extremities no edema synovitis rash or venous cords.   Skin:     General: Skin is warm and dry.      Capillary Refill: Capillary refill takes less than 2 seconds.   Neurological:      Comments: Face symmetric she yells she seems to be able to hear little bit she will open her eyes briefly.  She moves all extremities with good force and tries to push me away when I examine her.  No focal weakness it seems.  Knee jerks 1+ bilaterally.  Plantar response equivocal bilaterally.         Critical Care    Performed by: Luiz Valdes MD  Authorized by: Luiz Valdes MD    Critical care provider statement:     Critical care time (minutes):  60    Critical care was necessary to treat or prevent imminent or life-threatening deterioration of the following conditions:  Respiratory failure and CNS failure or compromise    Critical care was time spent personally by me on the following activities:  Discussions with consultants, evaluation of patient's response to treatment, examination of patient, obtaining history from patient or surrogate, review of old charts, re-evaluation of patient's condition, pulse oximetry, ordering and review of radiographic studies, ordering and review of laboratory studies and ordering and performing treatments and interventions            ED Course                                               Recent Results (from the past 24 hours)   ECG 12 Lead Stroke Evaluation    Collection Time: 05/06/25  9:13 AM   Result Value Ref Range    QT Interval 400 ms    QTC Interval 508 ms   Lactic Acid, Plasma    Collection Time: 05/06/25  9:22 AM    Specimen: Blood   Result Value Ref Range    Lactate 2.5 (C) 0.5 - 2.0 mmol/L   Sedimentation Rate    Collection Time: 05/06/25  9:22 AM    Specimen: Blood   Result Value Ref Range    Sed Rate 88 (H) 0 - 30 mm/hr   CBC Auto Differential    Collection Time:  05/06/25  9:22 AM    Specimen: Blood   Result Value Ref Range    WBC 13.21 (H) 3.40 - 10.80 10*3/mm3    RBC 4.15 3.77 - 5.28 10*6/mm3    Hemoglobin 12.9 12.0 - 15.9 g/dL    Hematocrit 42.5 34.0 - 46.6 %    .4 (H) 79.0 - 97.0 fL    MCH 31.1 26.6 - 33.0 pg    MCHC 30.4 (L) 31.5 - 35.7 g/dL    RDW 13.3 12.3 - 15.4 %    RDW-SD 51.0 37.0 - 54.0 fl    MPV 10.6 6.0 - 12.0 fL    Platelets 245 140 - 450 10*3/mm3    Neutrophil % 86.6 (H) 42.7 - 76.0 %    Lymphocyte % 7.9 (L) 19.6 - 45.3 %    Monocyte % 4.5 (L) 5.0 - 12.0 %    Eosinophil % 0.0 (L) 0.3 - 6.2 %    Basophil % 0.3 0.0 - 1.5 %    Immature Grans % 0.7 (H) 0.0 - 0.5 %    Neutrophils, Absolute 11.45 (H) 1.70 - 7.00 10*3/mm3    Lymphocytes, Absolute 1.04 0.70 - 3.10 10*3/mm3    Monocytes, Absolute 0.59 0.10 - 0.90 10*3/mm3    Eosinophils, Absolute 0.00 0.00 - 0.40 10*3/mm3    Basophils, Absolute 0.04 0.00 - 0.20 10*3/mm3    Immature Grans, Absolute 0.09 (H) 0.00 - 0.05 10*3/mm3    nRBC 0.0 0.0 - 0.2 /100 WBC   Blood Gas, Arterial With Co-Ox    Collection Time: 05/06/25  9:22 AM    Specimen: Arterial Blood   Result Value Ref Range    Site Right Radial     Daniel's Test Positive     pH, Arterial 7.463 (H) 7.350 - 7.450 pH units    pCO2, Arterial 45.7 (H) 35.0 - 45.0 mm Hg    pO2, Arterial 76.4 (L) 83.0 - 108.0 mm Hg    HCO3, Arterial 32.7 (H) 20.0 - 26.0 mmol/L    Base Excess, Arterial 7.8 (H) 0.0 - 2.0 mmol/L    Hemoglobin, Blood Gas 12.3 (L) 14 - 18 g/dL    Hematocrit, Blood Gas 37.7 (L) 38.0 - 51.0 %    Oxyhemoglobin 94.3 94 - 99 %    Methemoglobin 0.20 0.00 - 1.50 %    Carboxyhemoglobin 2.6 (H) 0 - 2 %    CO2 Content 34.1 (H) 22 - 33 mmol/L    Temperature 37.0     Barometric Pressure for Blood Gas      Modality Nasal Cannula     FIO2 28 %    Rate 0 Breaths/minute    PIP 0 cmH2O    IPAP 0     EPAP 0     pH, Temp Corrected 7.463 pH Units    pCO2, Temperature Corrected 45.7 (H) 35 - 45 mm Hg    pO2, Temperature Corrected 76.4 (L) 83 - 108 mm Hg   Comprehensive  Metabolic Panel    Collection Time: 05/06/25 10:01 AM    Specimen: Blood   Result Value Ref Range    Glucose 124 (H) 65 - 99 mg/dL    BUN 10 8 - 23 mg/dL    Creatinine 0.86 0.57 - 1.00 mg/dL    Sodium 143 136 - 145 mmol/L    Potassium 3.9 3.5 - 5.2 mmol/L    Chloride 104 98 - 107 mmol/L    CO2 29.0 22.0 - 29.0 mmol/L    Calcium 9.6 8.6 - 10.5 mg/dL    Total Protein 7.7 6.0 - 8.5 g/dL    Albumin 3.6 3.5 - 5.2 g/dL    ALT (SGPT) 19 1 - 33 U/L    AST (SGOT) 41 (H) 1 - 32 U/L    Alkaline Phosphatase 110 39 - 117 U/L    Total Bilirubin 0.2 0.0 - 1.2 mg/dL    Globulin 4.1 gm/dL    A/G Ratio 0.9 g/dL    BUN/Creatinine Ratio 11.6 7.0 - 25.0    Anion Gap 10.0 5.0 - 15.0 mmol/L    eGFR 74.6 >60.0 mL/min/1.73   BNP    Collection Time: 05/06/25 10:01 AM    Specimen: Blood   Result Value Ref Range    proBNP 502.9 0.0 - 900.0 pg/mL   D-dimer, Quantitative    Collection Time: 05/06/25 10:01 AM    Specimen: Blood   Result Value Ref Range    D-Dimer, Quantitative 2.13 (H) 0.00 - 0.66 MCGFEU/mL   High Sensitivity Troponin T    Collection Time: 05/06/25 10:01 AM    Specimen: Blood   Result Value Ref Range    HS Troponin T 143 (C) <14 ng/L   Procalcitonin    Collection Time: 05/06/25 10:01 AM    Specimen: Blood   Result Value Ref Range    Procalcitonin 0.21 0.00 - 0.25 ng/mL   Acetaminophen Level    Collection Time: 05/06/25 10:01 AM    Specimen: Blood   Result Value Ref Range    Acetaminophen <5.0 0.0 - 30.0 mcg/mL   Ethanol    Collection Time: 05/06/25 10:01 AM    Specimen: Blood   Result Value Ref Range    Ethanol <10 0 - 10 mg/dL   Salicylate Level    Collection Time: 05/06/25 10:01 AM    Specimen: Blood   Result Value Ref Range    Salicylate <0.3 <=30.0 mg/dL   TSH    Collection Time: 05/06/25 10:01 AM    Specimen: Blood   Result Value Ref Range    TSH 1.010 0.270 - 4.200 uIU/mL   Magnesium    Collection Time: 05/06/25 10:01 AM    Specimen: Blood   Result Value Ref Range    Magnesium 2.5 (H) 1.6 - 2.4 mg/dL   Respiratory Panel PCR  w/COVID-19(SARS-CoV-2) KATTY/ADAM/NIMA/PAD/COR/SHELBI In-House, NP Swab in UTM/VTM, 2 HR TAT - Swab, Nasopharynx    Collection Time: 05/06/25 10:09 AM    Specimen: Nasopharynx; Swab   Result Value Ref Range    ADENOVIRUS, PCR Not Detected Not Detected    Coronavirus 229E Not Detected Not Detected    Coronavirus HKU1 Not Detected Not Detected    Coronavirus NL63 Not Detected Not Detected    Coronavirus OC43 Not Detected Not Detected    COVID19 Not Detected Not Detected - Ref. Range    Human Metapneumovirus Not Detected Not Detected    Human Rhinovirus/Enterovirus Not Detected Not Detected    Influenza A PCR Not Detected Not Detected    Influenza B PCR Not Detected Not Detected    Parainfluenza Virus 1 Not Detected Not Detected    Parainfluenza Virus 2 Not Detected Not Detected    Parainfluenza Virus 3 Not Detected Not Detected    Parainfluenza Virus 4 Not Detected Not Detected    RSV, PCR Not Detected Not Detected    Bordetella pertussis pcr Not Detected Not Detected    Bordetella parapertussis PCR Not Detected Not Detected    Chlamydophila pneumoniae PCR Not Detected Not Detected    Mycoplasma pneumo by PCR Not Detected Not Detected   Urinalysis With Culture If Indicated - Urine, Catheter    Collection Time: 05/06/25 10:16 AM    Specimen: Urine, Catheter   Result Value Ref Range    Color, UA Yellow Yellow, Straw    Appearance, UA Clear Clear    pH, UA 7.0 5.0 - 8.0    Specific Gravity, UA 1.013 1.001 - 1.030    Glucose, UA Negative Negative    Ketones, UA Negative Negative    Bilirubin, UA Negative Negative    Blood, UA Negative Negative    Protein, UA Negative Negative    Leuk Esterase, UA Negative Negative    Nitrite, UA Negative Negative    Urobilinogen, UA 1.0 E.U./dL 0.2 - 1.0 E.U./dL   Urine Drug Screen - Urine, Catheter    Collection Time: 05/06/25 10:16 AM    Specimen: Urine, Catheter   Result Value Ref Range    THC, Screen, Urine Positive (A) Negative    Phencyclidine (PCP), Urine Negative Negative    Cocaine  Screen, Urine Negative Negative    Methamphetamine, Ur Negative Negative    Opiate Screen Negative Negative    Amphetamine Screen, Urine Negative Negative    Benzodiazepine Screen, Urine Negative Negative    Tricyclic Antidepressants Screen Negative Negative    Methadone Screen, Urine Positive (A) Negative    Barbiturates Screen, Urine Negative Negative    Oxycodone Screen, Urine Negative Negative    Buprenorphine, Screen, Urine Negative Negative   Fentanyl, Urine - Urine, Catheter    Collection Time: 05/06/25 10:16 AM    Specimen: Urine, Catheter   Result Value Ref Range    Fentanyl, Urine Negative Negative   High Sensitivity Troponin T 1Hr    Collection Time: 05/06/25 11:24 AM    Specimen: Blood   Result Value Ref Range    HS Troponin T 149 (C) <14 ng/L    Troponin T Numeric Delta 6 ng/L    Troponin T % Delta 4 Abnormal if >/= 20%     Note: In addition to lab results from this visit, the labs listed above may include labs taken at another facility or during a different encounter within the last 24 hours. Please correlate lab times with ED admission and discharge times for further clarification of the services performed during this visit.    CT Angiogram Chest   Final Result   Impression:   No evidence of pulmonary embolism.      New/increased in conspicuity compared to same day exam is bronchial wall thickening with scattered mucoid impaction within the bilateral lower lobes, with left basilar micronodularity as well as a small peripheral consolidation in the lingula. Findings    suspected to related to aspiration with subsequent endobronchial and downstream infection/inflammation.         Electronically Signed: Donato Ocampo MD     5/6/2025 12:27 PM EDT     Workstation ID: [id number]      CT Chest Without Contrast Diagnostic   Final Result   Impression:   No acute intrathoracic findings.      Motion-degraded exam.         Electronically Signed: Donato Ocampo MD     5/6/2025 9:13 AM EDT     Workstation ID: [id number]       CT Head Without Contrast   Final Result   Impression:   No acute intracranial findings are evident, within the confines of motion degradation.         Electronically Signed: Donato Ocampo MD     5/6/2025 9:17 AM EDT     Workstation ID: [id number]        Vitals:    05/06/25 1115 05/06/25 1130 05/06/25 1137 05/06/25 1230   BP:  (!) 84/42 99/63 99/62   Patient Position:       Pulse: 77 73 73 98   Resp:       Temp:       TempSrc:       SpO2: 92% 94%  100%   Weight:       Height:         Medications   sodium chloride 0.9 % infusion (125 mL/hr Intravenous New Bag 5/6/25 0929)   vancomycin IVPB 2000 mg in 0.9% Sodium Chloride 500 mL (2,000 mg Intravenous New Bag 5/6/25 1313)   sodium chloride 0.9 % flush 10 mL (10 mL Intravenous Given 5/6/25 1314)   sodium chloride 0.9 % flush 10 mL (has no administration in time range)   sodium chloride 0.9 % infusion 40 mL (has no administration in time range)   mupirocin (BACTROBAN) 2 % nasal ointment 1 Application (1 Application Each Nare Given 5/6/25 1314)   sennosides-docusate (PERICOLACE) 8.6-50 MG per tablet 2 tablet (2 tablets Oral Not Given 5/6/25 1314)     And   polyethylene glycol (MIRALAX) packet 17 g (has no administration in time range)     And   bisacodyl (DULCOLAX) EC tablet 5 mg (has no administration in time range)     And   bisacodyl (DULCOLAX) suppository 10 mg (has no administration in time range)   enoxaparin sodium (LOVENOX) syringe 40 mg (40 mg Subcutaneous Given 5/6/25 1314)   Potassium Replacement - Follow Nurse / BPA Driven Protocol (has no administration in time range)   Magnesium Standard Dose Replacement - Follow Nurse / BPA Driven Protocol (has no administration in time range)   Phosphorus Replacement - Follow Nurse / BPA Driven Protocol (has no administration in time range)   Calcium Replacement - Follow Nurse / BPA Driven Protocol (has no administration in time range)   ipratropium-albuterol (DUO-NEB) nebulizer solution 3 mL (has no administration in  time range)   piperacillin-tazobactam (ZOSYN) 3.375 g IVPB in 100 mL NS MBP (CD) (has no administration in time range)   haloperidol lactate (HALDOL) injection 2 mg (2 mg Intramuscular Given 5/6/25 0903)   thiamine (B-1) injection 100 mg (100 mg Intravenous Given 5/6/25 0929)   ipratropium-albuterol (DUO-NEB) nebulizer solution 3 mL (3 mL Nebulization Given 5/6/25 0919)   sodium chloride 0.9 % bolus 1,000 mL (1,000 mL Intravenous New Bag 5/6/25 1118)   methylPREDNISolone sodium succinate (SOLU-Medrol) injection 125 mg (125 mg Intravenous Given 5/6/25 1113)   piperacillin-tazobactam (ZOSYN) 3.375 g IVPB in 100 mL NS MBP (CD) (0 g Intravenous Stopped 5/6/25 1218)   iopamidol (ISOVUE-370) 76 % injection 75 mL (75 mL Intravenous Given 5/6/25 1200)     ECG/EMG Results (last 24 hours)       Procedure Component Value Units Date/Time    ECG 12 Lead Stroke Evaluation [[id number]] Collected: 05/06/25 0913     Updated: 05/06/25 0910     QT Interval 400 ms      QTC Interval 508 ms     Narrative:      Test Reason : Stroke Evaluation  Blood Pressure :   */*   mmHG  Vent. Rate :  97 BPM     Atrial Rate :  97 BPM     P-R Int : 168 ms          QRS Dur :  96 ms      QT Int : 400 ms       P-R-T Axes :  80  28  57 degrees    QTcB Int : 508 ms    Normal sinus rhythm  Prolonged QT  Abnormal ECG  When compared with ECG of 25-Dec-2013 04:53,  T wave inversion no longer evident in Anterior leads  QT has lengthened    Referred By: EDMD           Confirmed By:           ECG 12 Lead Stroke Evaluation   Final Result   Test Reason : Stroke Evaluation   Blood Pressure :   */*   mmHG   Vent. Rate :  97 BPM     Atrial Rate :  97 BPM      P-R Int : 168 ms          QRS Dur :  96 ms       QT Int : 400 ms       P-R-T Axes :  80  28  57 degrees     QTcB Int : 508 ms      Normal sinus rhythm   Prolonged QT   Abnormal ECG   When compared with ECG of 25-Dec-2013 04:53,   T wave inversion no longer evident in Anterior leads   QT has lengthened   Confirmed by  SHAWNA GODFREY, CHERYL ([id number]) on 5/6/2025 10:52:46 AM      Referred By: EDMD           Confirmed By: CHERYL MATOS MD               Medical Decision Making      I have reviewed all available studies and I personally and contemporaneously reviewed and interpreted the patient's head CT which shows nothing acute.  Also chest CT showing nothing acute.    The emergency department she received 2 mg of IV Haldol due to her agitation and on recheck she had improved dramatically was able to speak with me in complete sentences.  Her son was now at the bedside we talked at length he believes her mental status decline was due to taking her methadone at the same time as her gabapentin which she is on apparently 80 mg of methadone a day and 600 mg of the gabapentin 4 times a day.        Despite pulmonary toilet nebulizer treatments and the patient's oxygen saturation continue to decline and she was maxed out on nasal cannula and we had a switch over to high flow nasal cannula.  She also developed some transient hypotension with her blood pressure going down to the lower 90s upper 80s which responded to fluid bolus.  Her mental status remained improved.  Her GCS when I reexamined her was 14 I did have to have her just open her eyes when I spoke with her.  I have reexamined her multiple times.    Sounds like patient just had a breast biopsy yesterday.    Her D-dimer was positive and I have ordered a CTA of her chest which is subsequently shown what appears to be a new pneumonia with her initial CT.  I suspect the patient is aspirated.  Given the hypotension and hypoxia have started on broad-spectrum antibiotics ordered blood cultures I spoke Dr. Oneill, on-call critical care medicine who admitted to the ICU for careful observations and ongoing treatment.  Likely altered mental status due to polypharmacy.        Problems Addressed:  Acute respiratory failure with hypoxia and hypercapnia: complicated acute illness or injury with systemic  symptoms that poses a threat to life or bodily functions  Altered mental status, unspecified altered mental status type: complicated acute illness or injury with systemic symptoms that poses a threat to life or bodily functions  Hypotension, unspecified hypotension type: complicated acute illness or injury with systemic symptoms that poses a threat to life or bodily functions  Leukocytosis, unspecified type: complicated acute illness or injury with systemic symptoms that poses a threat to life or bodily functions    Amount and/or Complexity of Data Reviewed  Independent Historian:      Details: son  External Data Reviewed: notes.  Labs: ordered. Decision-making details documented in ED Course.  Radiology: ordered and independent interpretation performed. Decision-making details documented in ED Course.  ECG/medicine tests: ordered and independent interpretation performed. Decision-making details documented in ED Course.    Risk  Prescription drug management.  Decision regarding hospitalization.        Final diagnoses:   Altered mental status, unspecified altered mental status type   Acute respiratory failure with hypoxia and hypercapnia   Leukocytosis, unspecified type   Hypotension, unspecified hypotension type       ED Disposition  ED Disposition       ED Disposition   Decision to Admit    Condition   --    Comment   Level of Care: Critical Care [6]   Admitting Physician: MARGARITA RANDHAWA [[id number]]   Attending Physician: MARGARITA RANDHAWA [[id number]]                 No follow-up provider specified.       Medication List      No changes were made to your prescriptions during this visit.            Luiz Valdes MD  05/06/25 1320      Electronically signed by Luiz Valdes MD at 05/06/25 1320       Oxygen Therapy (last day)       Date/Time SpO2 Device (Oxygen Therapy) Flow (L/min) (Oxygen Therapy) Oxygen Concentration (%) ETCO2 (mmHg)    05/07/25 0600 90 room air -- -- --    05/07/25 0502 90 -- -- -- --    05/07/25  0500 91 room air -- -- --    05/07/25 0400 94 nasal cannula 1 -- --    05/07/25 0300 94 nasal cannula 2 -- --    05/07/25 0200 98 nasal cannula 3 -- --    05/07/25 0100 96 nasal cannula 4 -- --    05/07/25 0000 96 nasal cannula 5 -- --    05/06/25 2300 96 -- -- -- --    05/06/25 2200 95 nasal cannula 5 -- --    05/06/25 2100 97 -- -- -- --    05/06/25 2000 98 nasal cannula 5 -- --    05/06/25 1943 94 nasal cannula 5  -- --    05/06/25 1925 93 high-flow nasal cannula 40 40 --    05/06/25 1900 94 high-flow nasal cannula 40 40 --    05/06/25 1800 95 heated;high-flow nasal cannula 40 40 --    05/06/25 1730 94 -- -- -- --    05/06/25 1700 94 -- -- -- --    05/06/25 1645 96 heated;high-flow nasal cannula 40 40 --    05/06/25 1630 98 -- -- -- --    05/06/25 1600 97 heated;high-flow nasal cannula 40 40 --    05/06/25 1530 96 -- -- -- --    05/06/25 1500 99 -- -- -- --    05/06/25 1430 95 -- -- -- --    05/06/25 1400 94 heated;high-flow nasal cannula -- 55 --    05/06/25 1330 92 -- -- -- --    05/06/25 1315 93 -- -- -- --    05/06/25 1300 94 -- -- -- --    05/06/25 1230 100 heated;high-flow nasal cannula 50 55  --    05/06/25 1130 94 -- -- -- --    05/06/25 1115 92 heated;humidified;high-flow mask 50  80  --    05/06/25 1100 95 -- -- -- --    05/06/25 1050 95 heated;humidified;high-flow nasal cannula 40 50 --    05/06/25 1030 85 -- -- -- --    05/06/25 1000 90 -- -- -- --    05/06/25 0930 94 -- -- -- --    05/06/25 0919 98 nasal cannula 2 -- --    05/06/25 0907 95 nasal cannula 2 -- --          Current Facility-Administered Medications   Medication Dose Route Frequency Provider Last Rate Last Admin    azithromycin (ZITHROMAX) tablet 250 mg  250 mg Oral Q24H Nash Robles MD   250 mg at 05/06/25 1813    sennosides-docusate (PERICOLACE) 8.6-50 MG per tablet 2 tablet  2 tablet Oral BID Jen Marquez APRN   2 tablet at 05/06/25 2033    And    polyethylene glycol (MIRALAX) packet 17 g  17 g Oral Daily PRN Jimmy,  Jen CARRION, APRN        And    bisacodyl (DULCOLAX) EC tablet 5 mg  5 mg Oral Daily PRN Jen Marquez APRN        And    bisacodyl (DULCOLAX) suppository 10 mg  10 mg Rectal Daily PRN Jen Marquez APRN        budesonide (PULMICORT) nebulizer solution 0.5 mg  0.5 mg Nebulization BID - RT Nash Robles MD   0.5 mg at 05/06/25 1923    Calcium Replacement - Follow Nurse / BPA Driven Protocol   Not Applicable PRN Jen Marquez, APRN        enoxaparin sodium (LOVENOX) syringe 40 mg  40 mg Subcutaneous Daily Jen Marquez APRN   40 mg at 05/06/25 1314    ipratropium-albuterol (DUO-NEB) nebulizer solution 3 mL  3 mL Nebulization Q6H PRN Jen Marquez APRN        ipratropium-albuterol (DUO-NEB) nebulizer solution 3 mL  3 mL Nebulization 4x Daily - RT Nash Robles MD   3 mL at 05/06/25 1923    Magnesium Standard Dose Replacement - Follow Nurse / BPA Driven Protocol   Not Applicable PRN Jen Marquez APRN        mupirocin (BACTROBAN) 2 % nasal ointment 1 Application  1 Application Each Nare BID Jen Marquez APRN   1 Application at 05/06/25 2033    nicotine (NICODERM CQ) 21 MG/24HR patch 1 patch  1 patch Transdermal Q24H Fifi Scott APRN   1 patch at 05/06/25 1813    nicotine polacrilex (COMMIT) lozenge 2 mg  2 mg Mouth/Throat Q2H PRN Fifi Scott APRN        Phosphorus Replacement - Follow Nurse / BPA Driven Protocol   Not Applicable PRN Jen Marquez APRN        Potassium Replacement - Follow Nurse / BPA Driven Protocol   Not Applicable PRN Jen Marquez APRN        sodium chloride 0.9 % flush 10 mL  10 mL Intravenous Q12H Jen Marquez APRN   10 mL at 05/06/25 2033    sodium chloride 0.9 % flush 10 mL  10 mL Intravenous PRN Kuns-Gómez, Jen B, APRN        sodium chloride 0.9 % infusion 40 mL  40 mL Intravenous PRN Kuns-Gómez, Jen B, APRN        sodium chloride 7 % nebulizer solution nebulizer solution 4  mL  4 mL Nebulization BID - RT Nash Robles MD   4 mL at 05/06/25 1924     Lab Results (all)       Procedure Component Value Units Date/Time    CBC & Differential [[id number]]  (Abnormal) Collected: 05/07/25 0306    Specimen: Blood Updated: 05/07/25 0542    Narrative:      The following orders were created for panel order CBC & Differential.  Procedure                               Abnormality         Status                     ---------                               -----------         ------                     Manual Differential[[id number]]          Abnormal            Final result               CBC Auto Differential[[id number]]        Abnormal            Final result                 Please view results for these tests on the individual orders.    Manual Differential [[id number]]  (Abnormal) Collected: 05/07/25 0306    Specimen: Blood Updated: 05/07/25 0542     Neutrophil % 84.0 %      Lymphocyte % 10.0 %      Monocyte % 5.0 %      Eosinophil % 0.0 %      Basophil % 0.0 %      Bands %  1.0 %      Neutrophils Absolute 10.91 10*3/mm3      Lymphocytes Absolute 1.28 10*3/mm3      Monocytes Absolute 0.64 10*3/mm3      Eosinophils Absolute 0.00 10*3/mm3      Basophils Absolute 0.00 10*3/mm3      nRBC 0.0 /100 WBC      Macrocytes Slight/1+     WBC Morphology Normal     Platelet Morphology Normal    CBC Auto Differential [[id number]]  (Abnormal) Collected: 05/07/25 0306    Specimen: Blood Updated: 05/07/25 0542     WBC 12.83 10*3/mm3      RBC 3.75 10*6/mm3      Hemoglobin 11.6 g/dL      Hematocrit 37.6 %      .3 fL      MCH 30.9 pg      MCHC 30.9 g/dL      RDW 13.2 %      RDW-SD 49.1 fl      MPV 11.8 fL      Platelets 183 10*3/mm3     Basic Metabolic Panel [[id number]]  (Abnormal) Collected: 05/07/25 0306    Specimen: Blood Updated: 05/07/25 0450     Glucose 105 mg/dL      BUN 10 mg/dL      Creatinine 0.59 mg/dL      Sodium 140 mmol/L      Potassium 4.4 mmol/L      Comment: Slight hemolysis detected by analyzer.  Result may be falsely elevated.        Chloride 105 mmol/L      CO2 25.0 mmol/L      Calcium 8.4 mg/dL      BUN/Creatinine Ratio 16.9     Anion Gap 10.0 mmol/L      eGFR 99.5 mL/min/1.73     Narrative:      GFR Categories in Chronic Kidney Disease (CKD)              GFR Category          GFR (mL/min/1.73)    Interpretation  G1                    90 or greater        Normal or high (1)  G2                    60-89                Mild decrease (1)  G3a                   45-59                Mild to moderate decrease  G3b                   30-44                Moderate to severe decrease  G4                    15-29                Severe decrease  G5                    14 or less           Kidney failure    (1)In the absence of evidence of kidney disease, neither GFR category G1 or G2 fulfill the criteria for CKD.    eGFR calculation 2021 CKD-EPI creatinine equation, which does not include race as a factor    Magnesium [[id number]]  (Normal) Collected: 05/07/25 0306    Specimen: Blood Updated: 05/07/25 0450     Magnesium 2.0 mg/dL     Phosphorus [[id number]]  (Normal) Collected: 05/07/25 0306    Specimen: Blood Updated: 05/07/25 0450     Phosphorus 3.0 mg/dL     LSAC Slide Creation [[id number]] Collected: 05/07/25 0306    Specimen: Blood Updated: 05/07/25 0430    STAT Lactic Acid, Reflex [[id number]]  (Normal) Collected: 05/06/25 1313    Specimen: Blood from Arm, Left Updated: 05/06/25 1347     Lactate 0.8 mmol/L      Comment: Falsely depressed results may occur on samples drawn from patients receiving N-Acetylcysteine (NAC) or Metamizole.       High Sensitivity Troponin T 1Hr [[id number]]  (Abnormal) Collected: 05/06/25 1124    Specimen: Blood Updated: 05/06/25 1155     HS Troponin T 149 ng/L      Troponin T Numeric Delta 6 ng/L      Troponin T % Delta 4    Narrative:      High Sensitive Troponin T Reference Range:  <14.0 ng/L- Negative Female for AMI  <22.0 ng/L- Negative Male for AMI  >=14 - Abnormal Female indicating  possible myocardial injury.  >=22 - Abnormal Male indicating possible myocardial injury.   Clinicians would have to utilize clinical acumen, EKG, Troponin, and serial changes to determine if it is an Acute Myocardial Infarction or myocardial injury due to an underlying chronic condition.         Respiratory Panel PCR w/COVID-19(SARS-CoV-2) KATTY/ADAM/NIMA/PAD/COR/SHELBI In-House, NP Swab in UTM/VTM, 2 HR TAT - Swab, Nasopharynx [[id number]]  (Normal) Collected: 05/06/25 1009    Specimen: Swab from Nasopharynx Updated: 05/06/25 1124     ADENOVIRUS, PCR Not Detected     Coronavirus 229E Not Detected     Coronavirus HKU1 Not Detected     Coronavirus NL63 Not Detected     Coronavirus OC43 Not Detected     COVID19 Not Detected     Human Metapneumovirus Not Detected     Human Rhinovirus/Enterovirus Not Detected     Influenza A PCR Not Detected     Influenza B PCR Not Detected     Parainfluenza Virus 1 Not Detected     Parainfluenza Virus 2 Not Detected     Parainfluenza Virus 3 Not Detected     Parainfluenza Virus 4 Not Detected     RSV, PCR Not Detected     Bordetella pertussis pcr Not Detected     Bordetella parapertussis PCR Not Detected     Chlamydophila pneumoniae PCR Not Detected     Mycoplasma pneumo by PCR Not Detected    Narrative:      In the setting of a positive respiratory panel with a viral infection PLUS a negative procalcitonin without other underlying concern for bacterial infection, consider observing off antibiotics or discontinuation of antibiotics and continue supportive care. If the respiratory panel is positive for atypical bacterial infection (Bordetella pertussis, Chlamydophila pneumoniae, or Mycoplasma pneumoniae), consider antibiotic de-escalation to target atypical bacterial infection.    Fentanyl, Urine - Urine, Catheter [[id number]]  (Normal) Collected: 05/06/25 1016    Specimen: Urine, Catheter Updated: 05/06/25 1058     Fentanyl, Urine Negative    Narrative:      Negative Threshold:      Fentanyl 5  ng/mL     The normal value for the drug tested is negative. This report includes final unconfirmed screening results to be used for medical treatment purposes only. Unconfirmed results must not be used for non-medical purposes such as employment or legal testing. Clinical consideration should be applied to any drug of abuse test, particularly when unconfirmed results are used.           High Sensitivity Troponin T [[id number]]  (Abnormal) Collected: 05/06/25 1001    Specimen: Blood Updated: 05/06/25 1052     HS Troponin T 143 ng/L     Narrative:      High Sensitive Troponin T Reference Range:  <14.0 ng/L- Negative Female for AMI  <22.0 ng/L- Negative Male for AMI  >=14 - Abnormal Female indicating possible myocardial injury.  >=22 - Abnormal Male indicating possible myocardial injury.   Clinicians would have to utilize clinical acumen, EKG, Troponin, and serial changes to determine if it is an Acute Myocardial Infarction or myocardial injury due to an underlying chronic condition.         BNP [[id number]]  (Normal) Collected: 05/06/25 1001    Specimen: Blood Updated: 05/06/25 1051     proBNP 502.9 pg/mL     Narrative:      This assay is used as an aid in the diagnosis of individuals suspected of having heart failure. It can be used as an aid in the diagnosis of acute decompensated heart failure (ADHF) in patients presenting with signs and symptoms of ADHF to the emergency department (ED). In addition, NT-proBNP of <300 pg/mL indicates ADHF is not likely.    Age Range Result Interpretation  NT-proBNP Concentration (pg/mL:      <50             Positive            >450                   Gray                 300-450                    Negative             <300    50-75           Positive            >900                  Gray                300-900                  Negative            <300      >75             Positive            >1800                  Gray                300-1800                  Negative            <300  "   Procalcitonin [[id number]]  (Normal) Collected: 05/06/25 1001    Specimen: Blood Updated: 05/06/25 1051     Procalcitonin 0.21 ng/mL     Narrative:      As a Marker for Sepsis (Non-Neonates):    1. <0.5 ng/mL represents a low risk of severe sepsis and/or septic shock.  2. >2 ng/mL represents a high risk of severe sepsis and/or septic shock.    As a Marker for Lower Respiratory Tract Infections that require antibiotic therapy:    PCT on Admission    Antibiotic Therapy       6-12 Hrs later    >0.5                Strongly Recommended  >0.25 - <0.5        Recommended   0.1 - 0.25          Discouraged              Remeasure/reassess PCT  <0.1                Strongly Discouraged     Remeasure/reassess PCT    As 28 day mortality risk marker: \"Change in Procalcitonin Result\" (>80% or <=80%) if Day 0 (or Day 1) and Day 4 values are available. Refer to [email or web address]    Change in PCT <=80%  A decrease of PCT levels below or equal to 80% defines a positive change in PCT test result representing a higher risk for 28-day all-cause mortality of patients diagnosed with severe sepsis for septic shock.    Change in PCT >80%  A decrease of PCT levels of more than 80% defines a negative change in PCT result representing a lower risk for 28-day all-cause mortality of patients diagnosed with severe sepsis or septic shock.       TSH [[id number]]  (Normal) Collected: 05/06/25 1001    Specimen: Blood Updated: 05/06/25 1051     TSH 1.010 uIU/mL     Comprehensive Metabolic Panel [[id number]]  (Abnormal) Collected: 05/06/25 1001    Specimen: Blood Updated: 05/06/25 1049     Glucose 124 mg/dL      BUN 10 mg/dL      Creatinine 0.86 mg/dL      Sodium 143 mmol/L      Potassium 3.9 mmol/L      Chloride 104 mmol/L      CO2 29.0 mmol/L      Calcium 9.6 mg/dL      Total Protein 7.7 g/dL      Albumin 3.6 g/dL      ALT (SGPT) 19 U/L      AST (SGOT) 41 U/L      Alkaline Phosphatase 110 U/L      Total Bilirubin 0.2 mg/dL      " Globulin 4.1 gm/dL      Comment: Calculated Result        A/G Ratio 0.9 g/dL      BUN/Creatinine Ratio 11.6     Anion Gap 10.0 mmol/L      eGFR 74.6 mL/min/1.73     Narrative:      GFR Categories in Chronic Kidney Disease (CKD)              GFR Category          GFR (mL/min/1.73)    Interpretation  G1                    90 or greater        Normal or high (1)  G2                    60-89                Mild decrease (1)  G3a                   45-59                Mild to moderate decrease  G3b                   30-44                Moderate to severe decrease  G4                    15-29                Severe decrease  G5                    14 or less           Kidney failure    (1)In the absence of evidence of kidney disease, neither GFR category G1 or G2 fulfill the criteria for CKD.    eGFR calculation 2021 CKD-EPI creatinine equation, which does not include race as a factor    Ethanol [[id number]]  (Normal) Collected: 05/06/25 1001    Specimen: Blood Updated: 05/06/25 1049     Ethanol <10 mg/dL     Narrative:      Elevated lactic acid concentration and lactate dehydrogenase(LD) activity may falsely elevate enzymatically determined ethanol levels. Not for legal purposes.     Magnesium [[id number]]  (Abnormal) Collected: 05/06/25 1001    Specimen: Blood Updated: 05/06/25 1049     Magnesium 2.5 mg/dL     Acetaminophen Level [[id number]]  (Normal) Collected: 05/06/25 1001    Specimen: Blood Updated: 05/06/25 1049     Acetaminophen <5.0 mcg/mL     Salicylate Level [[id number]]  (Normal) Collected: 05/06/25 1001    Specimen: Blood Updated: 05/06/25 1049     Salicylate <0.3 mg/dL     Urine Drug Screen - Urine, Catheter [[id number]]  (Abnormal) Collected: 05/06/25 1016    Specimen: Urine, Catheter Updated: 05/06/25 1040     THC, Screen, Urine Positive     Phencyclidine (PCP), Urine Negative     Cocaine Screen, Urine Negative     Methamphetamine, Ur Negative     Opiate Screen Negative     Amphetamine Screen, Urine Negative      Benzodiazepine Screen, Urine Negative     Tricyclic Antidepressants Screen Negative     Methadone Screen, Urine Positive     Barbiturates Screen, Urine Negative     Oxycodone Screen, Urine Negative     Buprenorphine, Screen, Urine Negative    Narrative:      Cutoff For Drugs Screened:    Amphetamines               500 ng/ml  Barbiturates               200 ng/ml  Benzodiazepines            150 ng/ml  Cocaine                    150 ng/ml  Methadone                  200 ng/ml  Opiates                    100 ng/ml  Phencyclidine               25 ng/ml  THC                         50 ng/ml  Methamphetamine            500 ng/ml  Tricyclic Antidepressants  300 ng/ml  Oxycodone                  100 ng/ml  Buprenorphine               10 ng/ml    The normal value for all drugs tested is negative. This report includes unconfirmed screening results, with the cutoff values listed, to be used for medical treatment purposes only.  Unconfirmed results must not be used for non-medical purposes such as employment or legal testing.  Clinical consideration should be applied to any drug of abuse test, particularly when unconfirmed results are used.      Urinalysis With Culture If Indicated - Urine, Catheter [[id number]]  (Normal) Collected: 05/06/25 1016    Specimen: Urine, Catheter Updated: 05/06/25 1033     Color, UA Yellow     Appearance, UA Clear     pH, UA 7.0     Specific Gravity, UA 1.013     Glucose, UA Negative     Ketones, UA Negative     Bilirubin, UA Negative     Blood, UA Negative     Protein, UA Negative     Leuk Esterase, UA Negative     Nitrite, UA Negative     Urobilinogen, UA 1.0 E.U./dL    Narrative:      In absence of clinical symptoms, the presence of pyuria, bacteria, and/or nitrites on the urinalysis result does not correlate with infection.  Urine microscopic not indicated.    Lactic Acid, Plasma [[id number]]  (Abnormal) Collected: 05/06/25 0922    Specimen: Blood Updated: 05/06/25 1027     Lactate 2.5 mmol/L       "Comment: Falsely depressed results may occur on samples drawn from patients receiving N-Acetylcysteine (NAC) or Metamizole.       D-dimer, Quantitative [[id number]]  (Abnormal) Collected: 05/06/25 1001    Specimen: Blood Updated: 05/06/25 1025     D-Dimer, Quantitative 2.13 MCGFEU/mL     Narrative:      According to the assay 's published package insert, a normal (<0.50 MCGFEU/mL) D-dimer result in conjunction with a non-high clinical probability assessment, excludes deep vein thrombosis (DVT) and pulmonary embolism (PE) with high sensitivity.    D-dimer values increase with age and this can make VTE exclusion of an older population difficult. To address this, the [employer], based on best available evidence and recent guidelines, recommends that clinicians use age-adjusted D-dimer thresholds in patients greater than 50 years of age with: a) a low probability of PE who do not meet all Pulmonary Embolism Rule Out Criteria, or b) in those with intermediate probability of PE.   The formula for an age-adjusted D-dimer cut-off is \"age/100\".  For example, a 60 year old patient would have an age-adjusted cut-off of 0.60 MCGFEU/mL and an 80 year old 0.80 MCGFEU/mL.    Blood Culture - Blood, Arm, Right [[id number]] Collected: 05/06/25 1002    Specimen: Blood from Arm, Right Updated: 05/06/25 1017    Sedimentation Rate [[id number]]  (Abnormal) Collected: 05/06/25 0922    Specimen: Blood Updated: 05/06/25 0947     Sed Rate 88 mm/hr     CBC & Differential [[id number]]  (Abnormal) Collected: 05/06/25 0922    Specimen: Blood Updated: 05/06/25 0941    Narrative:      The following orders were created for panel order CBC & Differential.  Procedure                               Abnormality         Status                     ---------                               -----------         ------                     CBC Auto Differential[[id number]]        Abnormal            Final result                 Please " view results for these tests on the individual orders.    CBC Auto Differential [[id number]]  (Abnormal) Collected: 05/06/25 0922    Specimen: Blood Updated: 05/06/25 0941     WBC 13.21 10*3/mm3      RBC 4.15 10*6/mm3      Hemoglobin 12.9 g/dL      Hematocrit 42.5 %      .4 fL      MCH 31.1 pg      MCHC 30.4 g/dL      RDW 13.3 %      RDW-SD 51.0 fl      MPV 10.6 fL      Platelets 245 10*3/mm3      Neutrophil % 86.6 %      Lymphocyte % 7.9 %      Monocyte % 4.5 %      Eosinophil % 0.0 %      Basophil % 0.3 %      Immature Grans % 0.7 %      Neutrophils, Absolute 11.45 10*3/mm3      Lymphocytes, Absolute 1.04 10*3/mm3      Monocytes, Absolute 0.59 10*3/mm3      Eosinophils, Absolute 0.00 10*3/mm3      Basophils, Absolute 0.04 10*3/mm3      Immature Grans, Absolute 0.09 10*3/mm3      nRBC 0.0 /100 WBC     Blood Culture - Blood, Arm, Left [[id number]] Collected: 05/06/25 0920    Specimen: Blood from Arm, Left Updated: 05/06/25 0938    Blood Gas, Arterial With Co-Ox [[id number]]  (Abnormal) Collected: 05/06/25 0922    Specimen: Arterial Blood Updated: 05/06/25 0922     Site Right Radial     Daniel's Test Positive     pH, Arterial 7.463 pH units      Comment: 83 Value above reference range        pCO2, Arterial 45.7 mm Hg      Comment: 83 Value above reference range        pO2, Arterial 76.4 mm Hg      Comment: 84 Value below reference range        HCO3, Arterial 32.7 mmol/L      Base Excess, Arterial 7.8 mmol/L      Hemoglobin, Blood Gas 12.3 g/dL      Comment: 84 Value below reference range        Hematocrit, Blood Gas 37.7 %      Oxyhemoglobin 94.3 %      Methemoglobin 0.20 %      Carboxyhemoglobin 2.6 %      Comment: 83 Value above reference range        CO2 Content 34.1 mmol/L      Temperature 37.0     Barometric Pressure for Blood Gas --     Comment: N/A        Modality Nasal Cannula     FIO2 28 %      Rate 0 Breaths/minute      PIP 0 cmH2O      Comment: Meter: [id number]     :  [id number]        [facility]  0     EPAP 0     pH, Temp Corrected 7.463 pH Units      pCO2, Temperature Corrected 45.7 mm Hg      pO2, Temperature Corrected 76.4 mm Hg           Imaging Results (All)       Procedure Component Value Units Date/Time    CT Angiogram Chest [[id number]] Collected: 05/06/25 1210     Updated: 05/06/25 1230    Narrative:      CT ANGIOGRAM CHEST    Date of Exam: 5/6/2025 11:46 AM EDT    Indication: hypoxia, + d dimer, eval for pe.    Comparison: Same day noncontrast chest CT.    Technique: CTA of the chest was performed after the uneventful intravenous administration of 75 mL Isovue-370. Reconstructed coronal and sagittal images were also obtained. In addition, a 3-D volume rendered image was created for interpretation.   Automated exposure control and iterative reconstruction methods were used.    Findings:  Motion-degraded exam.    Pulmonary arteries / cardiovascular: No intraluminal filling defect to suggest pulmonary embolus. No pericardial effusion. Normal caliber mildly atherosclerotic thoracic aorta.    Lymph nodes and mediastinum: No lymphadenopathy or mass.    Lungs and airways: Bronchial wall thickening with scattered mucoid impaction within the bilateral lower lobes, left greater than right, with subtle micronodularity in the left lower lobe as well as small peripheral consolidation in the lingula; findings   are increased conspicuity compared to same day exam, possibly partly related to less motion artifact on this exam, though findings do appear truly new/worsened.    Pleura: No pleural effusion or pneumothorax.     Bones and soft tissues: Thoracic spondylosis. Suspected incidental vertebral body hemangioma at T11. No acute or suspicious osseous abnormality. Soft tissues are within normal limits.    Upper abdomen: Cholecystectomy.      Impression:      Impression:  No evidence of pulmonary embolism.    New/increased in conspicuity compared to same day exam is bronchial wall thickening with scattered mucoid  impaction within the bilateral lower lobes, with left basilar micronodularity as well as a small peripheral consolidation in the lingula. Findings   suspected to related to aspiration with subsequent endobronchial and downstream infection/inflammation.      Electronically Signed: Donato Ocampo MD    5/6/2025 12:27 PM EDT    Workstation ID: [id number]    CT Head Without Contrast [[id number]] Collected: 05/06/25 0904     Updated: 05/06/25 0921    Narrative:      CT HEAD WO CONTRAST    Date of Exam: 5/6/2025 8:52 AM EDT    Indication: uncon.    Comparison: None available.    Technique: Axial CT images were obtained of the head without contrast administration.  Automated exposure control and iterative construction methods were used.    Findings:  Motion-degraded exam.    No evidence of acute intracranial hemorrhage or mass effect. No extra-axial collection. The gray white matter differentiation is preserved. Ventricles and sulci are symmetric. The mastoid air cells and paranasal sinuses are well aerated. Globes and   extraocular muscles are unremarkable. No acute or suspicious osseous abnormality. Soft tissues within normal limits.      Impression:      Impression:  No acute intracranial findings are evident, within the confines of motion degradation.      Electronically Signed: Donato Ocampo MD    5/6/2025 9:17 AM EDT    Workstation ID: [id number]    CT Chest Without Contrast Diagnostic [[id number]] Collected: 05/06/25 0905     Updated: 05/06/25 0916    Narrative:      CT CHEST WO CONTRAST DIAGNOSTIC    Date of Exam: 5/6/2025 8:59 AM EDT    Indication: uncon.    Comparison: None available.    Technique: Axial CT images were obtained of the chest without contrast administration.  Reconstructed coronal and sagittal images were also obtained. Automated exposure control and iterative construction methods were used.    Findings:  Motion-degraded exam.    Cardiovascular: No pericardial effusion. Normal caliber mildly  atherosclerotic thoracic aorta. Difficult to reliably assess for coronary calcifications and motion artifact.    Lymph nodes and mediastinum: No lymphadenopathy or mass.    Lungs and airways: Central airways are patent. No suspicious pulmonary nodules or masses. Punctate benign calcified granuloma in the left upper lobe. No focal consolidation.    Pleura: No pleural effusion or pneumothorax.     Bones and soft tissues: Thoracic spondylosis. Suspected incidental vertebral body hemangioma at T11. No acute osseous abnormality. Soft tissues are within normal limits.    Upper abdomen: Cholecystectomy.      Impression:      Impression:  No acute intrathoracic findings.    Motion-degraded exam.      Electronically Signed: Donato Ocampo MD    5/6/2025 9:13 AM EDT    Workstation ID: [id number]          ECG/EMG Results (all)       Procedure Component Value Units Date/Time    ECG 12 Lead Stroke Evaluation [[id number]] Collected: 05/06/25 0913     Updated: 05/06/25 1052     QT Interval 400 ms      QTC Interval 508 ms     Narrative:      Test Reason : Stroke Evaluation  Blood Pressure :   */*   mmHG  Vent. Rate :  97 BPM     Atrial Rate :  97 BPM     P-R Int : 168 ms          QRS Dur :  96 ms      QT Int : 400 ms       P-R-T Axes :  80  28  57 degrees    QTcB Int : 508 ms    Normal sinus rhythm  Prolonged QT  Abnormal ECG  When compared with ECG of 25-Dec-2013 04:53,  T wave inversion no longer evident in Anterior leads  QT has lengthened  Confirmed by CHERYL MATOS MD ([id number]) on 5/6/2025 10:52:46 AM    Referred By: EDMD           Confirmed By: CHERYL MATOS MD    Telemetry Scan [[id number]] Resulted: 05/06/25     Updated: 05/06/25 1537          Physician Progress Notes (all)    No notes of this type exist for this encounter.       Consult Notes (all)    No notes of this type exist for this encounter.

## 2025-05-07 NOTE — CASE MANAGEMENT/SOCIAL WORK
Continued Stay Note  [facility]     Patient Name: Joy Thornton  MRN: [id number]  Today's Date: 5/7/2025    Admit Date: 5/6/2025    Plan: Home with family   Discharge Plan       Row Name 05/07/25 0821       Plan    Plan Home with family    Patient/Family in Agreement with Plan yes    Plan Comments Spoke with patient at bedside. Lives withson, Son will transport. Pt denies any discharge needs at this time. CM will continue to follow.    Final Discharge Disposition Code 01 - home or self-care                   Discharge Codes    No documentation.                       Tommy Mahoney RN     S/P LP today with elevated WBCs  and CSF culture pending  Started on empiric therapy of Vanco and Rocephin  Will consult ID  Negative CT head, negative CT angiogram  MRI and echocardiogram pending        Universal Safety Interventions

## 2025-05-07 NOTE — THERAPY EVALUATION
Patient Name: Joy Thornton  : 1960    MRN: [id number]                              Today's Date: 2025       Admit Date: 2025    Visit Dx:     ICD-10-CM ICD-9-CM   1. Altered mental status, unspecified altered mental status type  R41.82 780.97   2. Acute respiratory failure with hypoxia and hypercapnia  J96.01 518.81    J96.02    3. Leukocytosis, unspecified type  D72.829 288.60   4. Hypotension, unspecified hypotension type  I95.9 458.9   5. Chronic obstructive pulmonary disease with acute lower respiratory infection  J44.0 496     519.8   6. Acute respiratory failure with hypoxia  J96.01 518.81     Patient Active Problem List   Diagnosis    Chronic pain syndrome    COPD (chronic obstructive pulmonary disease)    Malignant neoplasm of female breast    Tobacco use     Past Medical History:   Diagnosis Date    Malignant neoplasm of female breast 2025     History reviewed. No pertinent surgical history.   General Information       Row Name 25 1133          OT Time and Intention    Document Type evaluation  -SA     Mode of Treatment occupational therapy  -       Row Name 25 1133          General Information    Patient Profile Reviewed yes  -SA     Prior Level of Function independent:;all household mobility;community mobility;gait;transfer;ADL's  I with all ADLs and mobility; owns Hurrycane and rollator and uses as needed  -     Existing Precautions/Restrictions fall  -     Barriers to Rehab medically complex  -       Row Name 25 1133          Living Environment    Current Living Arrangements apartment;other (see comments)  Elevator access  -SA     People in Home child(joan), adult  -       Row Name 25 1133          Home Main Entrance    Number of Stairs, Main Entrance none  -SA       Row Name 25 1133          Stairs Within Home, Primary    Number of Stairs, Within Home, Primary none  -SA       Row Name 25 1133          Cognition    Orientation Status  (Cognition) oriented x 3  -       Row Name 05/07/25 1133          Safety Issues/Impairments Affecting Functional Mobility    Safety Issues Affecting Function (Mobility) awareness of need for assistance;insight into deficits/self-awareness;safety precaution awareness;safety precautions follow-through/compliance  -     Impairments Affecting Function (Mobility) balance;endurance/activity tolerance;strength  -               User Key  (r) = Recorded By, (t) = Taken By, (c) = Cosigned By      Initials Name Provider Type     Chetan Jones OT Occupational Therapist                     Mobility/ADL's       Row Name 05/07/25 1137          Bed Mobility    Bed Mobility supine-sit  -     Supine-Sit [place] (Bed Mobility) standby assist  -     Assistive Device (Bed Mobility) bed rails;head of bed elevated;repositioning sheet  -[facility] Name 05/07/25 1137          Transfers    Transfers sit-stand transfer;stand-sit transfer  -[facility] Name 05/07/25 1137          Sit-Stand Transfer    Sit-Stand [place] (Transfers) verbal cues;contact guard  -     Assistive Device (Sit-Stand Transfers) other (see comments)  Hurrycane  -[facility] Name 05/07/25 1137          Stand-Sit Transfer    Stand-Sit [place] (Transfers) verbal cues;contact guard  -     Assistive Device (Stand-Sit Transfers) other (see comments)  Hurrycane  -[facility] Name 05/07/25 1137          Functional Mobility    Functional Mobility- Ind. Level contact guard assist;verbal cues required  -     Functional Mobility- Device other (see comments)  Hurrycane  -     Functional Mobility-Distance (Feet) --  Greater than household distance  -     Functional Mobility- Comment Pt ambulated greater than household distance with Hurrycane and CGA. Pt on room air throughout intervention, SPO2 did drop to 85% briefly while patient was talking, however, returned to 90% within 5 seconds of rest and remained above 90% throughout rest of  treatment session  -     Patient was able to Ambulate yes  -[facility] Name 05/07/25 1137          Activities of Daily Living    BADL Assessment/Intervention lower body dressing  -[facility] Name 05/07/25 1137          Lower Body Dressing Assessment/Training    [place] Level (Lower Body Dressing) don;socks;maximum assist (25% patient effort)  -     Position (Lower Body Dressing) supine  -               User Key  (r) = Recorded By, (t) = Taken By, (c) = Cosigned By      Initials Name Provider Type    SA Chetan Jones OT Occupational Therapist                   Obj/Interventions       [facility] Name 05/07/25 1144          Sensory Assessment (Somatosensory)    Sensory Assessment (Somatosensory) sensation intact  -SA       Row Name 05/07/25 1144          Vision Assessment/Intervention    Visual Impairment/Limitations WFL  -SA       Row Name 05/07/25 1144          Range of Motion Comprehensive    General Range of Motion no range of motion deficits identified  -SA       Row Name 05/07/25 1144          Strength Comprehensive (MMT)    General Manual Muscle Testing (MMT) Assessment no strength deficits identified  -     Comment, General Manual Muscle Testing (MMT) Assessment 5/5 throughout  -SA       Row Name 05/07/25 1144          Balance    Balance Assessment sitting static balance;sitting dynamic balance;sit to stand dynamic balance;standing static balance;standing dynamic balance  -     Static Sitting Balance independent  -     Dynamic Sitting Balance standby assist  -     Position, Sitting Balance unsupported;sitting edge of bed  -     Sit to Stand Dynamic Balance contact guard  -     Static Standing Balance contact guard  -     Dynamic Standing Balance contact guard  -     Position/Device Used, Standing Balance other (see comments)  Hurrycane  -     Balance Interventions sitting;standing;sit to stand;supported;static;dynamic;minimal challenge;occupation based/functional task  -      Comment, Balance CGA progressing to SBA with Hurrycane  -SA               User Key  (r) = Recorded By, (t) = Taken By, (c) = Cosigned By      Initials Name Provider Type    SA Chetan Jones OT Occupational Therapist                   Goals/Plan       Row Name 05/07/25 1153          Bathing Goal 1 (OT)    Activity/Device (Bathing Goal 1, OT) upper body bathing;lower body bathing  -SA     [place] Level/Cues Needed (Bathing Goal 1, OT) independent  -SA     Time Frame (Bathing Goal 1, OT) long term goal (LTG);10 days  -SA     Progress/Outcomes (Bathing Goal 1, OT) new goal  -SA       Row Name 05/07/25 1153          Dressing Goal 1 (OT)    Activity/Device (Dressing Goal 1, OT) lower body dressing  -SA     [place]/Cues Needed (Dressing Goal 1, OT) independent  -SA     Time Frame (Dressing Goal 1, OT) long term goal (LTG);10 days  -SA     Progress/Outcome (Dressing Goal 1, OT) new goal  -SA       Row Name 05/07/25 1153          Grooming Goal 1 (OT)    Activity/Device (Grooming Goal 1, OT) grooming skills, all  -SA     [place] (Grooming Goal 1, OT) independent  Standing at sink while maintaing SPO2 >90% without rest break  -SA     Time Frame (Grooming Goal 1, OT) short term goal (STG);5 days  -SA     Progress/Outcome (Grooming Goal 1, OT) new goal  -SA       Row Name 05/07/25 1153          Therapy Assessment/Plan (OT)    Planned Therapy Interventions (OT) activity tolerance training;BADL retraining;functional balance retraining;occupation/activity based interventions;patient/caregiver education/training;strengthening exercise  -SA               User Key  (r) = Recorded By, (t) = Taken By, (c) = Cosigned By      Initials Name Provider Type    Chetan Mclaughlin OT Occupational Therapist                   Clinical Impression       Row Name 05/07/25 1145          Pain Assessment    Pretreatment Pain Rating 0/10 - no pain  -SA     Posttreatment Pain Rating 0/10 - no pain  -SA       Row Name 05/07/25  1148 05/07/25 1145       Plan of Care Review    Plan of Care Reviewed With -- patient  -SA    Progress -- no change  -    Outcome Evaluation OT evaluation completed. Pt was able to peform bed mobility and functional mobility, walking greater than household distance with CGA, progressing to SBA and use of Hurrycane. Pt on room air throughout session. Pt's SPO2 did briefly drop to 85%, however, after 5 second standing rest break, returned to and remained >90%. Pt presents with generalized weakness, decreased activity tolerance, and decreased independence with ADLs and fxnl mobility and would benefit from skilled OT services to improve fxnl status. OT recommends home with assist and home health at discharge.  - --      Row Name 05/07/25 1148          Therapy Assessment/Plan (OT)    Patient/Family Therapy Goal Statement (OT) Return home and get back to work  -     Rehab Potential (OT) good  -     Criteria for Skilled Therapeutic Interventions Met (OT) yes;meets criteria;skilled treatment is necessary  -     Therapy Frequency (OT) 3 times/wk  -       Row Name 05/07/25 1148          Therapy Plan Review/Discharge Plan (OT)    Anticipated Discharge Disposition (OT) home with assist;home with home health  -       Row Name 05/07/25 1148          Vital Signs    Pre Systolic BP Rehab 115  -SA     Pre Treatment Diastolic BP 62  -SA     Post Systolic BP Rehab 130  -SA     Post Treatment Diastolic BP 60  -SA     Pretreatment Heart Rate (beats/min) 64  -SA     Posttreatment Heart Rate (beats/min) 66  -SA     Pre SpO2 (%) 94  -SA     O2 Delivery Pre Treatment room air  -SA     Intra SpO2 (%) 85  -SA     O2 Delivery Intra Treatment room air  -SA     Post SpO2 (%) 91  -SA     O2 Delivery Post Treatment room air  -SA     Pre Patient Position Supine  -SA     Intra Patient Position Standing  -SA     Recovery Time 5 seconds  -       Row Name 05/07/25 1148          Positioning and Restraints    Pre-Treatment Position in bed   -SA     Post Treatment Position chair  -SA     In Chair notified nsg;sitting;call light within reach;encouraged to call for assist;exit alarm on;with family/caregiver;waffle cushion;legs elevated  -               User Key  (r) = Recorded By, (t) = Taken By, (c) = Cosigned By      Initials Name Provider Type    Chetan Mclaughlin OT Occupational Therapist                   Outcome Measures       Row Name 05/07/25 1154          How much help from another is currently needed...    Putting on and taking off regular lower body clothing? 2  -SA     Bathing (including washing, rinsing, and drying) 3  -SA     Toileting (which includes using toilet bed pan or urinal) 3  -SA     Putting on and taking off regular upper body clothing 4  -SA     Taking care of personal grooming (such as brushing teeth) 3  -SA     Eating meals 4  -SA     AM-PAC 6 Clicks Score (OT) 19  -SA       Row Name 05/07/25 0800          How much help from another person do you currently need...    Turning from your back to your side while in flat bed without using bedrails? 4  -MM     Moving from lying on back to sitting on the side of a flat bed without bedrails? 3  -MM     Moving to and from a bed to a chair (including a wheelchair)? 3  -MM     Standing up from a chair using your arms (e.g., wheelchair, bedside chair)? 3  -MM     Climbing 3-5 steps with a railing? 3  -MM     To walk in hospital room? 3  -MM     AM-PAC 6 Clicks Score (PT) 19  -MM       Row Name 05/07/25 1154          Functional Assessment    Outcome Measure Options AM-PAC 6 Clicks Daily Activity (OT)  -               User Key  (r) = Recorded By, (t) = Taken By, (c) = Cosigned By      Initials Name Provider Type    Balbina Briggs RN Registered Nurse    Chetan Mclaughlin OT Occupational Therapist                    Occupational Therapy Education       Title: PT OT SLP Therapies (In Progress)       Topic: Occupational Therapy (In Progress)       Point: ADL training (In  Progress)       Learning Progress Summary            Patient Acceptance, E, NR by  at 5/7/2025 1158                      Point: Body mechanics (In Progress)       Learning Progress Summary            Patient Acceptance, E, NR by  at 5/7/2025 1158                                      User Key       Initials Effective Dates Name Provider Type Discipline     04/16/25 -  Chetan Jones OT Occupational Therapist OT                  OT Recommendation and Plan  Planned Therapy Interventions (OT): activity tolerance training, BADL retraining, functional balance retraining, occupation/activity based interventions, patient/caregiver education/training, strengthening exercise  Therapy Frequency (OT): 3 times/wk  Plan of Care Review  Plan of Care Reviewed With: patient  Progress: no change  Outcome Evaluation: OT evaluation completed. Pt was able to peform bed mobility and functional mobility, walking greater than household distance with CGA, progressing to SBA and use of Hurrycane. Pt on room air throughout session. Pt's SPO2 did briefly drop to 85%, however, after 5 second standing rest break, returned to and remained >90%. Pt presents with generalized weakness, decreased activity tolerance, and decreased independence with ADLs and fxnl mobility and would benefit from skilled OT services to improve fxnl status. OT recommends home with assist and home health at discharge.     Time Calculation:   Evaluation Complexity (OT)  Review Occupational Profile/Medical/Therapy History Complexity: brief/low complexity  Assessment, Occupational Performance/Identification of Deficit Complexity: 1-3 performance deficits  Clinical Decision Making Complexity (OT): problem focused assessment/low complexity  Overall Complexity of Evaluation (OT): low complexity     Time Calculation- OT       Row Name 05/07/25 1159             Time Calculation- OT    OT Start Time 1020  -SA      OT Received On 05/07/25  -      OT Goal Re-Cert Due  Date 05/17/25  -SA         Untimed Charges    OT Eval/Re-eval Minutes 53  -SA         Total Minutes    Untimed Charges Total Minutes 53  -SA       Total Minutes 53  -SA                User Key  (r) = Recorded By, (t) = Taken By, (c) = Cosigned By      Initials Name Provider Type    Chetan Mclaughlin OT Occupational Therapist                  Therapy Charges for Today       Code Description Service Date Service Provider Modifiers Qty    28066929627 HC OT EVAL LOW COMPLEXITY 4 5/7/2025 Chetan Jones OT GO 1                 Chetan Jones OT  5/7/2025

## 2025-05-07 NOTE — CASE MANAGEMENT/SOCIAL WORK
Case Management Discharge Note      Final Note: Plan is home with son. Son will transport. [employer] delivered a new nebulizer to the patient. Patient's O2 sats did not qualify for home O2.         Selected Continued Care - Admitted Since 5/6/2025       Destination    No services have been selected for the patient.                Durable Medical Equipment Coordination complete.      Service Provider Services Address Phone Fax Patient Preferred    [facility] [place] [facility] [address] , [facility] [address] [phone] --              Dialysis/Infusion    No services have been selected for the patient.                Home Medical Care    No services have been selected for the patient.                Therapy    No services have been selected for the patient.                Community Resources    No services have been selected for the patient.                Community & DME    No services have been selected for the patient.                         Final Discharge Disposition Code: 01 - home or self-care

## 2025-05-08 NOTE — OUTREACH NOTE
Prep Survey      Flowsheet Row Responses   [facility] facility patient discharged from? [facility]   Is LACE score < 7 ? No   Eligibility Readm Mgmt   Discharge diagnosis Altered mental status   Does the patient have one of the following disease processes/diagnoses(primary or secondary)? Other   Does the patient have Home health ordered? No   Is there a DME ordered? Yes   What DME was ordered? [facility] [place]   Comments regarding appointments [facility] for Methadone   Prep survey completed? Yes            BERNARDINO ANG - Registered Nurse

## 2025-05-08 NOTE — PAYOR COMM NOTE
"Ref# [id number]   Still Pending  Discharge Summary    NATASHA Landeros, RN  Utilization Review  Phone [phone]  Fax [phone]    [facility]  [address] [place] [address]         Robb Thornton (64 y.o. Female)       Date of Birth   1960    Social Security Number       Address   [address] [place] [address] [address] [place] [address]    Home Phone   [phone]    MRN   [id number]       Oriental orthodox   Taoism    Marital Status                               Admission Date   5/6/2025    Admission Type   Emergency    Admitting Provider   Nash Robles MD    Attending Provider       Department, Room/Bed   [facility] 2B ICU, N224/1       Discharge Date   5/7/2025    Discharge Disposition   Home or Self Care    Discharge Destination                                 Attending Provider: (none)   Allergies: No Known Allergies    Isolation: None   Infection: None   Code Status: Prior    Ht: 162.6 cm (64\")   Wt: 102 kg (225 lb)    Admission Cmt: None   Principal Problem: AMS (altered mental status) [R41.82]                   Active Insurance as of 5/6/2025       Primary Coverage       Payor Plan Insurance Group Employer/Plan Group    ANTHEM MEDICARE REPLACEMENT ANTHEM MEDICARE ADVANTAGE HMO [id number]       Payor Plan Address Payor Plan Phone Number Payor Plan Fax Number Effective Dates    [address] [phone]  4/1/2025 - None Entered    [facility] [id number]         Subscriber Name Subscriber Birth Date Member ID       ROBB THORNTON 1960 [id number]                     Emergency Contacts        (Rel.) Home Phone Work Phone Mobile Phone    Joe Goodman (Son) [phone] -- [phone]              Oxygen Therapy (last day) before discharge       Date/Time SpO2 Device (Oxygen Therapy) Flow (L/min) (Oxygen Therapy) Oxygen Concentration (%) ETCO2 (mmHg)    05/07/25 1300 93 -- -- -- --    05/07/25 1200 92 room air -- -- --    " 05/07/25 1100 94 -- -- -- --    05/07/25 1000 89 room air -- -- --    05/07/25 0900 100 -- -- -- --    05/07/25 0800 96 room air -- -- --    05/07/25 0700 99 -- -- -- --    05/07/25 0600 90 room air -- -- --    05/07/25 0502 90 -- -- -- --    05/07/25 0500 91 room air -- -- --    05/07/25 0400 94 nasal cannula 1 -- --    05/07/25 0300 94 nasal cannula 2 -- --    05/07/25 0200 98 nasal cannula 3 -- --    05/07/25 0100 96 nasal cannula 4 -- --    05/07/25 0000 96 nasal cannula 5 -- --    05/06/25 2300 96 -- -- -- --    05/06/25 2200 95 nasal cannula 5 -- --    05/06/25 2100 97 -- -- -- --    05/06/25 2000 98 nasal cannula 5 -- --    05/06/25 1943 94 nasal cannula 5  -- --    05/06/25 1925 93 high-flow nasal cannula 40 40 --    05/06/25 1900 94 high-flow nasal cannula 40 40 --    05/06/25 1800 95 heated;high-flow nasal cannula 40 40 --    05/06/25 1730 94 -- -- -- --    05/06/25 1700 94 -- -- -- --    05/06/25 1645 96 heated;high-flow nasal cannula 40 40 --    05/06/25 1630 98 -- -- -- --    05/06/25 1600 97 heated;high-flow nasal cannula 40 40 --    05/06/25 1530 96 -- -- -- --    05/06/25 1500 99 -- -- -- --    05/06/25 1430 95 -- -- -- --    05/06/25 1400 94 heated;high-flow nasal cannula -- 55 --    05/06/25 1330 92 -- -- -- --    05/06/25 1315 93 -- -- -- --    05/06/25 1300 94 -- -- -- --    05/06/25 1230 100 heated;high-flow nasal cannula 50 55  --    05/06/25 1130 94 -- -- -- --    05/06/25 1115 92 heated;humidified;high-flow mask 50  80  --    05/06/25 1100 95 -- -- -- --    05/06/25 1050 95 heated;humidified;high-flow nasal cannula 40 50 --    05/06/25 1030 85 -- -- -- --    05/06/25 1000 90 -- -- -- --    05/06/25 0930 94 -- -- -- --    05/06/25 0919 98 nasal cannula 2 -- --    05/06/25 0907 95 nasal cannula 2 -- --          No current facility-administered medications for this encounter.     Current Outpatient Medications   Medication Sig Dispense Refill    albuterol (PROVENTIL) (2.5 MG/3ML) 0.083%  nebulizer solution Take 2.5 mg by nebulization Every 4 (Four) Hours As Needed for Wheezing for up to 30 days. 90 mL 2    azithromycin (ZITHROMAX) 250 MG tablet Take 1 tablet by mouth Daily for 3 doses. Indications: Worsening of Chronic Obstructive Lung Disease 3 tablet 0    Fluticasone-Umeclidin-Vilant (Trelegy Ellipta) 100-62.5-25 MCG/ACT inhaler Inhale 1 puff Daily. 60 each 1    nicotine (NICODERM CQ) 21 MG/24HR patch Place 1 patch on the skin as directed by provider Daily. 30 patch 1    nicotine polacrilex (COMMIT) 2 MG lozenge Dissolve 1 lozenge in the mouth Every 2 (Two) Hours As Needed for Smoking Cessation. 108 each 1    gabapentin (NEURONTIN) 600 MG tablet Take 1 tablet by mouth 4 (Four) Times a Day.      methadone (DOLOPHINE) 5 MG/5ML solution Take 105 mL by mouth Daily.       Lab Results (last 48 hours)       Procedure Component Value Units Date/Time    CBC & Differential [[id number]]  (Abnormal) Collected: 05/07/25 0306    Specimen: Blood Updated: 05/07/25 0542    Narrative:      The following orders were created for panel order CBC & Differential.  Procedure                               Abnormality         Status                     ---------                               -----------         ------                     Manual Differential[[id number]]          Abnormal            Final result               CBC Auto Differential[[id number]]        Abnormal            Final result                 Please view results for these tests on the individual orders.    Manual Differential [[id number]]  (Abnormal) Collected: 05/07/25 0306    Specimen: Blood Updated: 05/07/25 0542     Neutrophil % 84.0 %      Lymphocyte % 10.0 %      Monocyte % 5.0 %      Eosinophil % 0.0 %      Basophil % 0.0 %      Bands %  1.0 %      Neutrophils Absolute 10.91 10*3/mm3      Lymphocytes Absolute 1.28 10*3/mm3      Monocytes Absolute 0.64 10*3/mm3      Eosinophils Absolute 0.00 10*3/mm3      Basophils Absolute 0.00 10*3/mm3      nRBC 0.0  /100 WBC      Macrocytes Slight/1+     WBC Morphology Normal     Platelet Morphology Normal    CBC Auto Differential [[id number]]  (Abnormal) Collected: 05/07/25 0306    Specimen: Blood Updated: 05/07/25 0542     WBC 12.83 10*3/mm3      RBC 3.75 10*6/mm3      Hemoglobin 11.6 g/dL      Hematocrit 37.6 %      .3 fL      MCH 30.9 pg      MCHC 30.9 g/dL      RDW 13.2 %      RDW-SD 49.1 fl      MPV 11.8 fL      Platelets 183 10*3/mm3     Basic Metabolic Panel [[id number]]  (Abnormal) Collected: 05/07/25 0306    Specimen: Blood Updated: 05/07/25 0450     Glucose 105 mg/dL      BUN 10 mg/dL      Creatinine 0.59 mg/dL      Sodium 140 mmol/L      Potassium 4.4 mmol/L      Comment: Slight hemolysis detected by analyzer. Result may be falsely elevated.        Chloride 105 mmol/L      CO2 25.0 mmol/L      Calcium 8.4 mg/dL      BUN/Creatinine Ratio 16.9     Anion Gap 10.0 mmol/L      eGFR 99.5 mL/min/1.73     Narrative:      GFR Categories in Chronic Kidney Disease (CKD)              GFR Category          GFR (mL/min/1.73)    Interpretation  G1                    90 or greater        Normal or high (1)  G2                    60-89                Mild decrease (1)  G3a                   45-59                Mild to moderate decrease  G3b                   30-44                Moderate to severe decrease  G4                    15-29                Severe decrease  G5                    14 or less           Kidney failure    (1)In the absence of evidence of kidney disease, neither GFR category G1 or G2 fulfill the criteria for CKD.    eGFR calculation 2021 CKD-EPI creatinine equation, which does not include race as a factor    Magnesium [[id number]]  (Normal) Collected: 05/07/25 0306    Specimen: Blood Updated: 05/07/25 0450     Magnesium 2.0 mg/dL     Phosphorus [[id number]]  (Normal) Collected: 05/07/25 0306    Specimen: Blood Updated: 05/07/25 0450     Phosphorus 3.0 mg/dL     LSAC Slide Creation [[id number]] Collected:  05/07/25 0306    Specimen: Blood Updated: 05/07/25 0430    STAT Lactic Acid, Reflex [[id number]]  (Normal) Collected: 05/06/25 1313    Specimen: Blood from Arm, Left Updated: 05/06/25 1347     Lactate 0.8 mmol/L      Comment: Falsely depressed results may occur on samples drawn from patients receiving N-Acetylcysteine (NAC) or Metamizole.       High Sensitivity Troponin T 1Hr [[id number]]  (Abnormal) Collected: 05/06/25 1124    Specimen: Blood Updated: 05/06/25 1155     HS Troponin T 149 ng/L      Troponin T Numeric Delta 6 ng/L      Troponin T % Delta 4    Narrative:      High Sensitive Troponin T Reference Range:  <14.0 ng/L- Negative Female for AMI  <22.0 ng/L- Negative Male for AMI  >=14 - Abnormal Female indicating possible myocardial injury.  >=22 - Abnormal Male indicating possible myocardial injury.   Clinicians would have to utilize clinical acumen, EKG, Troponin, and serial changes to determine if it is an Acute Myocardial Infarction or myocardial injury due to an underlying chronic condition.         Respiratory Panel PCR w/COVID-19(SARS-CoV-2) KATTY/ADAM/NIMA/PAD/COR/SHELBI In-House, NP Swab in UTM/VTM, 2 HR TAT - Swab, Nasopharynx [[id number]]  (Normal) Collected: 05/06/25 1009    Specimen: Swab from Nasopharynx Updated: 05/06/25 1124     ADENOVIRUS, PCR Not Detected     Coronavirus 229E Not Detected     Coronavirus HKU1 Not Detected     Coronavirus NL63 Not Detected     Coronavirus OC43 Not Detected     COVID19 Not Detected     Human Metapneumovirus Not Detected     Human Rhinovirus/Enterovirus Not Detected     Influenza A PCR Not Detected     Influenza B PCR Not Detected     Parainfluenza Virus 1 Not Detected     Parainfluenza Virus 2 Not Detected     Parainfluenza Virus 3 Not Detected     Parainfluenza Virus 4 Not Detected     RSV, PCR Not Detected     Bordetella pertussis pcr Not Detected     Bordetella parapertussis PCR Not Detected     Chlamydophila pneumoniae PCR Not Detected     Mycoplasma pneumo by PCR  Not Detected    Narrative:      In the setting of a positive respiratory panel with a viral infection PLUS a negative procalcitonin without other underlying concern for bacterial infection, consider observing off antibiotics or discontinuation of antibiotics and continue supportive care. If the respiratory panel is positive for atypical bacterial infection (Bordetella pertussis, Chlamydophila pneumoniae, or Mycoplasma pneumoniae), consider antibiotic de-escalation to target atypical bacterial infection.    Fentanyl, Urine - Urine, Catheter [[id number]]  (Normal) Collected: 05/06/25 1016    Specimen: Urine, Catheter Updated: 05/06/25 1058     Fentanyl, Urine Negative    Narrative:      Negative Threshold:      Fentanyl 5 ng/mL     The normal value for the drug tested is negative. This report includes final unconfirmed screening results to be used for medical treatment purposes only. Unconfirmed results must not be used for non-medical purposes such as employment or legal testing. Clinical consideration should be applied to any drug of abuse test, particularly when unconfirmed results are used.           High Sensitivity Troponin T [[id number]]  (Abnormal) Collected: 05/06/25 1001    Specimen: Blood Updated: 05/06/25 1052     HS Troponin T 143 ng/L     Narrative:      High Sensitive Troponin T Reference Range:  <14.0 ng/L- Negative Female for AMI  <22.0 ng/L- Negative Male for AMI  >=14 - Abnormal Female indicating possible myocardial injury.  >=22 - Abnormal Male indicating possible myocardial injury.   Clinicians would have to utilize clinical acumen, EKG, Troponin, and serial changes to determine if it is an Acute Myocardial Infarction or myocardial injury due to an underlying chronic condition.         BNP [[id number]]  (Normal) Collected: 05/06/25 1001    Specimen: Blood Updated: 05/06/25 1051     proBNP 502.9 pg/mL     Narrative:      This assay is used as an aid in the diagnosis of individuals suspected of having  "heart failure. It can be used as an aid in the diagnosis of acute decompensated heart failure (ADHF) in patients presenting with signs and symptoms of ADHF to the emergency department (ED). In addition, NT-proBNP of <300 pg/mL indicates ADHF is not likely.    Age Range Result Interpretation  NT-proBNP Concentration (pg/mL:      <50             Positive            >450                   Gray                 300-450                    Negative             <300    50-75           Positive            >900                  Gray                300-900                  Negative            <300      >75             Positive            >1800                  Gray                300-1800                  Negative            <300    Procalcitonin [[id number]]  (Normal) Collected: 05/06/25 1001    Specimen: Blood Updated: 05/06/25 1051     Procalcitonin 0.21 ng/mL     Narrative:      As a Marker for Sepsis (Non-Neonates):    1. <0.5 ng/mL represents a low risk of severe sepsis and/or septic shock.  2. >2 ng/mL represents a high risk of severe sepsis and/or septic shock.    As a Marker for Lower Respiratory Tract Infections that require antibiotic therapy:    PCT on Admission    Antibiotic Therapy       6-12 Hrs later    >0.5                Strongly Recommended  >0.25 - <0.5        Recommended   0.1 - 0.25          Discouraged              Remeasure/reassess PCT  <0.1                Strongly Discouraged     Remeasure/reassess PCT    As 28 day mortality risk marker: \"Change in Procalcitonin Result\" (>80% or <=80%) if Day 0 (or Day 1) and Day 4 values are available. Refer to http://[email or web address]-pct-calculator.com    Change in PCT <=80%  A decrease of PCT levels below or equal to 80% defines a positive change in PCT test result representing a higher risk for 28-day all-cause mortality of patients diagnosed with severe sepsis for septic shock.    Change in PCT >80%  A decrease of PCT levels of more than 80% defines a negative change in " PCT result representing a lower risk for 28-day all-cause mortality of patients diagnosed with severe sepsis or septic shock.       TSH [[id number]]  (Normal) Collected: 05/06/25 1001    Specimen: Blood Updated: 05/06/25 1051     TSH 1.010 uIU/mL     Comprehensive Metabolic Panel [[id number]]  (Abnormal) Collected: 05/06/25 1001    Specimen: Blood Updated: 05/06/25 1049     Glucose 124 mg/dL      BUN 10 mg/dL      Creatinine 0.86 mg/dL      Sodium 143 mmol/L      Potassium 3.9 mmol/L      Chloride 104 mmol/L      CO2 29.0 mmol/L      Calcium 9.6 mg/dL      Total Protein 7.7 g/dL      Albumin 3.6 g/dL      ALT (SGPT) 19 U/L      AST (SGOT) 41 U/L      Alkaline Phosphatase 110 U/L      Total Bilirubin 0.2 mg/dL      Globulin 4.1 gm/dL      Comment: Calculated Result        A/G Ratio 0.9 g/dL      BUN/Creatinine Ratio 11.6     Anion Gap 10.0 mmol/L      eGFR 74.6 mL/min/1.73     Narrative:      GFR Categories in Chronic Kidney Disease (CKD)              GFR Category          GFR (mL/min/1.73)    Interpretation  G1                    90 or greater        Normal or high (1)  G2                    60-89                Mild decrease (1)  G3a                   45-59                Mild to moderate decrease  G3b                   30-44                Moderate to severe decrease  G4                    15-29                Severe decrease  G5                    14 or less           Kidney failure    (1)In the absence of evidence of kidney disease, neither GFR category G1 or G2 fulfill the criteria for CKD.    eGFR calculation 2021 CKD-EPI creatinine equation, which does not include race as a factor    Ethanol [[id number]]  (Normal) Collected: 05/06/25 1001    Specimen: Blood Updated: 05/06/25 1049     Ethanol <10 mg/dL     Narrative:      Elevated lactic acid concentration and lactate dehydrogenase(LD) activity may falsely elevate enzymatically determined ethanol levels. Not for legal purposes.     Magnesium [[id number]]   (Abnormal) Collected: 05/06/25 1001    Specimen: Blood Updated: 05/06/25 1049     Magnesium 2.5 mg/dL     Acetaminophen Level [[id number]]  (Normal) Collected: 05/06/25 1001    Specimen: Blood Updated: 05/06/25 1049     Acetaminophen <5.0 mcg/mL     Salicylate Level [[id number]]  (Normal) Collected: 05/06/25 1001    Specimen: Blood Updated: 05/06/25 1049     Salicylate <0.3 mg/dL     Urine Drug Screen - Urine, Catheter [[id number]]  (Abnormal) Collected: 05/06/25 1016    Specimen: Urine, Catheter Updated: 05/06/25 1040     THC, Screen, Urine Positive     Phencyclidine (PCP), Urine Negative     Cocaine Screen, Urine Negative     Methamphetamine, Ur Negative     Opiate Screen Negative     Amphetamine Screen, Urine Negative     Benzodiazepine Screen, Urine Negative     Tricyclic Antidepressants Screen Negative     Methadone Screen, Urine Positive     Barbiturates Screen, Urine Negative     Oxycodone Screen, Urine Negative     Buprenorphine, Screen, Urine Negative    Narrative:      Cutoff For Drugs Screened:    Amphetamines               500 ng/ml  Barbiturates               200 ng/ml  Benzodiazepines            150 ng/ml  Cocaine                    150 ng/ml  Methadone                  200 ng/ml  Opiates                    100 ng/ml  Phencyclidine               25 ng/ml  THC                         50 ng/ml  Methamphetamine            500 ng/ml  Tricyclic Antidepressants  300 ng/ml  Oxycodone                  100 ng/ml  Buprenorphine               10 ng/ml    The normal value for all drugs tested is negative. This report includes unconfirmed screening results, with the cutoff values listed, to be used for medical treatment purposes only.  Unconfirmed results must not be used for non-medical purposes such as employment or legal testing.  Clinical consideration should be applied to any drug of abuse test, particularly when unconfirmed results are used.      Urinalysis With Culture If Indicated - Urine, Catheter [[id number]]  " (Normal) Collected: 05/06/25 1016    Specimen: Urine, Catheter Updated: 05/06/25 1033     Color, UA Yellow     Appearance, UA Clear     pH, UA 7.0     Specific Gravity, UA 1.013     Glucose, UA Negative     Ketones, UA Negative     Bilirubin, UA Negative     Blood, UA Negative     Protein, UA Negative     Leuk Esterase, UA Negative     Nitrite, UA Negative     Urobilinogen, UA 1.0 E.U./dL    Narrative:      In absence of clinical symptoms, the presence of pyuria, bacteria, and/or nitrites on the urinalysis result does not correlate with infection.  Urine microscopic not indicated.    Lactic Acid, Plasma [[id number]]  (Abnormal) Collected: 05/06/25 0922    Specimen: Blood Updated: 05/06/25 1027     Lactate 2.5 mmol/L      Comment: Falsely depressed results may occur on samples drawn from patients receiving N-Acetylcysteine (NAC) or Metamizole.       D-dimer, Quantitative [[id number]]  (Abnormal) Collected: 05/06/25 1001    Specimen: Blood Updated: 05/06/25 1025     D-Dimer, Quantitative 2.13 MCGFEU/mL     Narrative:      According to the assay 's published package insert, a normal (<0.50 MCGFEU/mL) D-dimer result in conjunction with a non-high clinical probability assessment, excludes deep vein thrombosis (DVT) and pulmonary embolism (PE) with high sensitivity.    D-dimer values increase with age and this can make VTE exclusion of an older population difficult. To address this, the [employer], based on best available evidence and recent guidelines, recommends that clinicians use age-adjusted D-dimer thresholds in patients greater than 50 years of age with: a) a low probability of PE who do not meet all Pulmonary Embolism Rule Out Criteria, or b) in those with intermediate probability of PE.   The formula for an age-adjusted D-dimer cut-off is \"age/100\".  For example, a 60 year old patient would have an age-adjusted cut-off of 0.60 MCGFEU/mL and an 80 year old 0.80 MCGFEU/mL.    " Sedimentation Rate [[id number]]  (Abnormal) Collected: 05/06/25 0922    Specimen: Blood Updated: 05/06/25 0947     Sed Rate 88 mm/hr     CBC & Differential [[id number]]  (Abnormal) Collected: 05/06/25 0922    Specimen: Blood Updated: 05/06/25 0941    Narrative:      The following orders were created for panel order CBC & Differential.  Procedure                               Abnormality         Status                     ---------                               -----------         ------                     CBC Auto Differential[[id number]]        Abnormal            Final result                 Please view results for these tests on the individual orders.    CBC Auto Differential [[id number]]  (Abnormal) Collected: 05/06/25 0922    Specimen: Blood Updated: 05/06/25 0941     WBC 13.21 10*3/mm3      RBC 4.15 10*6/mm3      Hemoglobin 12.9 g/dL      Hematocrit 42.5 %      .4 fL      MCH 31.1 pg      MCHC 30.4 g/dL      RDW 13.3 %      RDW-SD 51.0 fl      MPV 10.6 fL      Platelets 245 10*3/mm3      Neutrophil % 86.6 %      Lymphocyte % 7.9 %      Monocyte % 4.5 %      Eosinophil % 0.0 %      Basophil % 0.3 %      Immature Grans % 0.7 %      Neutrophils, Absolute 11.45 10*3/mm3      Lymphocytes, Absolute 1.04 10*3/mm3      Monocytes, Absolute 0.59 10*3/mm3      Eosinophils, Absolute 0.00 10*3/mm3      Basophils, Absolute 0.04 10*3/mm3      Immature Grans, Absolute 0.09 10*3/mm3      nRBC 0.0 /100 WBC     Blood Gas, Arterial With Co-Ox [[id number]]  (Abnormal) Collected: 05/06/25 0922    Specimen: Arterial Blood Updated: 05/06/25 0922     Site Right Radial     Daniel's Test Positive     pH, Arterial 7.463 pH units      Comment: 83 Value above reference range        pCO2, Arterial 45.7 mm Hg      Comment: 83 Value above reference range        pO2, Arterial 76.4 mm Hg      Comment: 84 Value below reference range        HCO3, Arterial 32.7 mmol/L      Base Excess, Arterial 7.8 mmol/L      Hemoglobin, Blood Gas 12.3 g/dL       Comment: 84 Value below reference range        Hematocrit, Blood Gas 37.7 %      Oxyhemoglobin 94.3 %      Methemoglobin 0.20 %      Carboxyhemoglobin 2.6 %      Comment: 83 Value above reference range        CO2 Content 34.1 mmol/L      Temperature 37.0     Barometric Pressure for Blood Gas --     Comment: N/A        Modality Nasal Cannula     FIO2 28 %      Rate 0 Breaths/minute      PIP 0 cmH2O      Comment: Meter: [id number]     :  [id number]        IPAP 0     EPAP 0     pH, Temp Corrected 7.463 pH Units      pCO2, Temperature Corrected 45.7 mm Hg      pO2, Temperature Corrected 76.4 mm Hg           Imaging Results (Last 48 Hours)       Procedure Component Value Units Date/Time    CT Angiogram Chest [[id number]] Collected: 05/06/25 1210     Updated: 05/06/25 1230    Narrative:      CT ANGIOGRAM CHEST    Date of Exam: 5/6/2025 11:46 AM EDT    Indication: hypoxia, + d dimer, eval for pe.    Comparison: Same day noncontrast chest CT.    Technique: CTA of the chest was performed after the uneventful intravenous administration of 75 mL Isovue-370. Reconstructed coronal and sagittal images were also obtained. In addition, a 3-D volume rendered image was created for interpretation.   Automated exposure control and iterative reconstruction methods were used.    Findings:  Motion-degraded exam.    Pulmonary arteries / cardiovascular: No intraluminal filling defect to suggest pulmonary embolus. No pericardial effusion. Normal caliber mildly atherosclerotic thoracic aorta.    Lymph nodes and mediastinum: No lymphadenopathy or mass.    Lungs and airways: Bronchial wall thickening with scattered mucoid impaction within the bilateral lower lobes, left greater than right, with subtle micronodularity in the left lower lobe as well as small peripheral consolidation in the lingula; findings   are increased conspicuity compared to same day exam, possibly partly related to less motion artifact on this exam, though  findings do appear truly new/worsened.    Pleura: No pleural effusion or pneumothorax.     Bones and soft tissues: Thoracic spondylosis. Suspected incidental vertebral body hemangioma at T11. No acute or suspicious osseous abnormality. Soft tissues are within normal limits.    Upper abdomen: Cholecystectomy.      Impression:      Impression:  No evidence of pulmonary embolism.    New/increased in conspicuity compared to same day exam is bronchial wall thickening with scattered mucoid impaction within the bilateral lower lobes, with left basilar micronodularity as well as a small peripheral consolidation in the lingula. Findings   suspected to related to aspiration with subsequent endobronchial and downstream infection/inflammation.      Electronically Signed: Donato Ocampo MD    5/6/2025 12:27 PM EDT    Workstation ID: [id number]    CT Head Without Contrast [[id number]] Collected: 05/06/25 0904     Updated: 05/06/25 0921    Narrative:      CT HEAD WO CONTRAST    Date of Exam: 5/6/2025 8:52 AM EDT    Indication: uncon.    Comparison: None available.    Technique: Axial CT images were obtained of the head without contrast administration.  Automated exposure control and iterative construction methods were used.    Findings:  Motion-degraded exam.    No evidence of acute intracranial hemorrhage or mass effect. No extra-axial collection. The gray white matter differentiation is preserved. Ventricles and sulci are symmetric. The mastoid air cells and paranasal sinuses are well aerated. Globes and   extraocular muscles are unremarkable. No acute or suspicious osseous abnormality. Soft tissues within normal limits.      Impression:      Impression:  No acute intracranial findings are evident, within the confines of motion degradation.      Electronically Signed: Donato Ocampo MD    5/6/2025 9:17 AM EDT    Workstation ID: [id number]    CT Chest Without Contrast Diagnostic [[id number]] Collected: 05/06/25 0905     Updated: 05/06/25  0916    Narrative:      CT CHEST WO CONTRAST DIAGNOSTIC    Date of Exam: 5/6/2025 8:59 AM EDT    Indication: uncon.    Comparison: None available.    Technique: Axial CT images were obtained of the chest without contrast administration.  Reconstructed coronal and sagittal images were also obtained. Automated exposure control and iterative construction methods were used.    Findings:  Motion-degraded exam.    Cardiovascular: No pericardial effusion. Normal caliber mildly atherosclerotic thoracic aorta. Difficult to reliably assess for coronary calcifications and motion artifact.    Lymph nodes and mediastinum: No lymphadenopathy or mass.    Lungs and airways: Central airways are patent. No suspicious pulmonary nodules or masses. Punctate benign calcified granuloma in the left upper lobe. No focal consolidation.    Pleura: No pleural effusion or pneumothorax.     Bones and soft tissues: Thoracic spondylosis. Suspected incidental vertebral body hemangioma at T11. No acute osseous abnormality. Soft tissues are within normal limits.    Upper abdomen: Cholecystectomy.      Impression:      Impression:  No acute intrathoracic findings.    Motion-degraded exam.      Electronically Signed: Donato Ocampo MD    5/6/2025 9:13 AM EDT    Workstation ID: [id number]             Physician Progress Notes (last 48 hours)        Nash Robles MD at 05/07/25 0942          Doing well.  Weaned down to 1 L oxygen but SpO2 is 96% and therefore can be weaned off completely.  Will perform a walking oximetry and discharge home with long-acting bronchodilators such as Trelegy or Symbicort in addition to her albuterol. 5 days of Z-Chaitanya. No steroid.  Counseled against drug abuse.      Electronically signed by Nash Robles MD at 05/07/25 0988       Consult Notes (last 48 hours)  Notes from 05/06/25 1542 through 05/08/25 1542   No notes of this type exist for this encounter.          Discharge Summary        Fifi Scott, APRN at 05/07/25 0902           Discharge Summary    Patient name: Joy Thornton  CSN: [id number]  MRN: [id number]  : 1960  Today's date: 2025     Date of Admission: 2025  Date of Discharge:  2025    Admitting Physician:  Dr. Nash Robles MD; Intensivist  Primary Care Provider: Rose Mary Covington MD  Consultations: N/A    Admission Diagnosis: Altered mental status     Discharge Diagnoses:   Active Hospital Problems    Diagnosis     Chronic pain syndrome     COPD (chronic obstructive pulmonary disease)     Tobacco use      Allergies:  Patient has no known allergies.    Code Status and Medical Interventions: CPR (Attempt to Resuscitate); Full Support   Ordered at: 25 1443     Code Status (Patient has no pulse and is not breathing):    CPR (Attempt to Resuscitate)     Medical Interventions (Patient has pulse or is breathing):    Full Support     Procedures/Testing:  CT CHEST WO CONTRAST DIAGNOSTIC  Date of Exam: 2025 8:59 AM EDT  Indication: uncon.  Comparison: None available.     Technique: Axial CT images were obtained of the chest without contrast administration.  Reconstructed coronal and sagittal images were also obtained. Automated exposure control and iterative construction methods were used.     Findings:  Motion-degraded exam.     Cardiovascular: No pericardial effusion. Normal caliber mildly atherosclerotic thoracic aorta. Difficult to reliably assess for coronary calcifications and motion artifact.     Lymph nodes and mediastinum: No lymphadenopathy or mass.     Lungs and airways: Central airways are patent. No suspicious pulmonary nodules or masses. Punctate benign calcified granuloma in the left upper lobe. No focal consolidation.     Pleura: No pleural effusion or pneumothorax.     Bones and soft tissues: Thoracic spondylosis. Suspected incidental vertebral body hemangioma at T11. No acute osseous abnormality. Soft tissues are within normal limits.     Upper abdomen: Cholecystectomy.      IMPRESSION:  Impression:  No acute intrathoracic findings.  Motion-degraded exam.     Electronically Signed: Donato Ocampo MD    5/6/2025 9:13 AM EDT    Workstation ID: [id number]       CT HEAD WO CONTRAST  Date of Exam: 5/6/2025 8:52 AM EDT  Indication: uncon.  Comparison: None available.  Technique: Axial CT images were obtained of the head without contrast administration.  Automated exposure control and iterative construction methods were used.     Findings:  Motion-degraded exam.     No evidence of acute intracranial hemorrhage or mass effect. No extra-axial collection. The gray white matter differentiation is preserved. Ventricles and sulci are symmetric. The mastoid air cells and paranasal sinuses are well aerated. Globes and   extraocular muscles are unremarkable. No acute or suspicious osseous abnormality. Soft tissues within normal limits.     IMPRESSION:  Impression:  No acute intracranial findings are evident, within the confines of motion degradation.     Electronically Signed: Donato Ocampo MD   5/6/2025 9:17 AM EDT    Workstation ID: [id number]      CT ANGIOGRAM CHEST  Date of Exam: 5/6/2025 11:46 AM EDT  Indication: hypoxia, + d dimer, eval for pe.  Comparison: Same day noncontrast chest CT.     Technique: CTA of the chest was performed after the uneventful intravenous administration of 75 mL Isovue-370. Reconstructed coronal and sagittal images were also obtained. In addition, a 3-D volume rendered image was created for interpretation.   Automated exposure control and iterative reconstruction methods were used.     Findings:  Motion-degraded exam.     Pulmonary arteries / cardiovascular: No intraluminal filling defect to suggest pulmonary embolus. No pericardial effusion. Normal caliber mildly atherosclerotic thoracic aorta.     Lymph nodes and mediastinum: No lymphadenopathy or mass.     Lungs and airways: Bronchial wall thickening with scattered mucoid impaction within the bilateral lower lobes, left  greater than right, with subtle micronodularity in the left lower lobe as well as small peripheral consolidation in the lingula; findings   are increased conspicuity compared to same day exam, possibly partly related to less motion artifact on this exam, though findings do appear truly new/worsened.     Pleura: No pleural effusion or pneumothorax.     Bones and soft tissues: Thoracic spondylosis. Suspected incidental vertebral body hemangioma at T11. No acute or suspicious osseous abnormality. Soft tissues are within normal limits.     Upper abdomen: Cholecystectomy.     IMPRESSION:  Impression:  No evidence of pulmonary embolism.     New/increased in conspicuity compared to same day exam is bronchial wall thickening with scattered mucoid impaction within the bilateral lower lobes, with left basilar micronodularity as well as a small peripheral consolidation in the lingula. Findings   suspected to related to aspiration with subsequent endobronchial and downstream infection/inflammation.     Electronically Signed: Donato Ocampo MD   5/6/2025 12:27 PM EDT   Workstation ID: [id number]        History of Present Illness:  Joy Thornton is a 64 y.o. female, current smoker, with PMHx COPD and chronic pain (on methadone and gabapentin at home) who presented to [facility] ED on 5/6/2025 via EMS after being found with altered mental status by her son, who was not able to wake her up. Per EMS report, she was poorly responsive on their arrival and she was given Narcan 4 mg x 1 without significant improvement. She was placed on 2L nasal cannula oxygen and transported to [facility].     Hospital Course:  On arrival to the ED, she remained altered and became more hypoxic, requiring HFNC. CT head and CT chest were unremarkable, with the exception of an incidental 5-mm RLL nodule. UDS was positive for methadone and THC. 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 She reported taking a THC gummy.     She  "was admitted to the ICU given altered mental status and oxygen requirements. She initially wished to leave [place]; however, she was deemed to be disoriented and she ultimately elected to stay. She reports being a smoker since age 16 and was also initially treated for COPD exacerbation with azithromycin , budesonide, and albuterol. Patient does have an albuterol inhaler at home but denies frequent use. During her short hospital course, she was quickly weaned from HFNC to 1L nasal cannula oxygen. She was back to her mental baseline and tolerating a PO diet. A walking oximetry test was performed prior to discharge with OT and saturations remained above 90%. She does not qualify for home oxygen. She will be discharged home on Trelegy. A new nebulizer and medications have been prescribed.    She was seen by [facility] with referral to restart home methadone at half dose and followup with methadone clinic.    As of 5/7/2025, she was deemed appropriate for discharge. Discharge instructions below.    Vitals:  /52   Pulse 58   Temp 98.3 °F (36.8 °C) (Axillary)   Resp 18   Ht 162.6 cm (64\")   Wt 102 kg (225 lb)   SpO2 100%   BMI 38.62 kg/m²     Physical Exam:  Constitutional:  Appears well-developed and well-nourished. No distress.   HEENT:  Normocephalic and atraumatic. PERRL  Neck:  Neck supple. No JVD present.   CV: Normal rate, regular rhythm,  intact distal pulses.  No gallop, murmur or rub.  Pulmonary/Chest: Effort normal and breath sounds normal. No respiratory distress. No wheezes, rhonchi or rales.   Abdominal: Soft. +BS. No distension and no mass. There is no tenderness.   Musculoskeletal: Normal muscle tone and strength  Neurological: Alert and oriented to person, place, and time.  No focal deficits  Skin: Skin is warm and dry. No rash noted.   Extremities:  No clubbing, edema or cyanosis  Psychiatric: Normal mood and affect. Behavior is normal.     Labs:  Results from last 7 days   Lab Units " 05/07/25  0306   WBC 10*3/mm3 12.83*   HEMOGLOBIN g/dL 11.6*   HEMATOCRIT % 37.6   PLATELETS 10*3/mm3 183     Results from last 7 days   Lab Units 05/07/25  0306 05/06/25  1001   SODIUM mmol/L 140 143   POTASSIUM mmol/L 4.4 3.9   CHLORIDE mmol/L 105 104   CO2 mmol/L 25.0 29.0   BUN mg/dL 10 10   CREATININE mg/dL 0.59 0.86   CALCIUM mg/dL 8.4* 9.6   BILIRUBIN mg/dL  --  0.2   ALK PHOS U/L  --  110   ALT (SGPT) U/L  --  19   AST (SGOT) U/L  --  41*   GLUCOSE mg/dL 105* 124*         Magnesium   Date Value Ref Range Status   05/07/2025 2.0 1.6 - 2.4 mg/dL Final   05/06/2025 2.5 (H) 1.6 - 2.4 mg/dL Final     Phosphorus   Date Value Ref Range Status   05/07/2025 3.0 2.5 - 4.5 mg/dL Final                    Discharge Medications        New Medications        Instructions Start Date   albuterol (2.5 MG/3ML) 0.083% nebulizer solution  Commonly known as: PROVENTIL   2.5 mg, Nebulization, Every 4 Hours PRN      azithromycin 250 MG tablet  Commonly known as: ZITHROMAX   250 mg, Oral, Every 24 Hours Scheduled   Start Date: May 8, 2025     nicotine 21 MG/24HR patch  Commonly known as: NICODERM CQ   1 patch, Transdermal, Every 24 Hours Scheduled      nicotine polacrilex 2 MG lozenge  Commonly known as: COMMIT   2 mg, Mouth/Throat, Every 2 Hours PRN      Trelegy Ellipta 100-62.5-25 MCG/ACT inhaler  Generic drug: Fluticasone-Umeclidin-Vilant   1 puff, Inhalation, Daily - RT             Continue These Medications        Instructions Start Date   gabapentin 600 MG tablet  Commonly known as: NEURONTIN   600 mg, Oral, 4 Times Daily      methadone 5 MG/5ML solution  Commonly known as: DOLOPHINE   105 mg, Oral, Daily             Follow-up Appointments  Follow up with PCP in 1 week  Will need 3-month CT scan for follow up on RLL pulmonary nodule    Discharge Instructions:  Discharge home today  Transportation per family  Follow up with PCP. Would recommend 3- month follow-up CT for incidental 5-mm RLL pulmonary nodule  Medications as  above  Restart home methadone at HALF DOSE until follow up with methadone clinic                                                                                                                                                        Return to the hospital or call 911 with recurrence of symptoms     Yani Scott, MSN, APRN, ACN-AG  Pulmonary and Critical Care Medicine  Electronically signed by ALINA Ramos, 05/07/25, 9:46 AM EDT.     Time: I spent 33 minutes on this discharge activity which included: face-to-face encounter with the patient, reviewing the data in the system, coordination of the care with the nursing staff as well as consultants, documentation, and entering orders.      CC: Rose Mary Covington MD          Electronically signed by Fifi Scott APRN at 05/07/25 1227       Discharge Order (From admission, onward)       Start     Ordered    05/07/25 1213  Discharge patient  Once        Expected Discharge Date: 05/07/25   Expected Discharge Time: Midday   Discharge Disposition: Home or Self Care   Physician of Record for Attribution - Please select from Treatment Team: VALERIO FORBES [[id number]]   Review needed by CMO to determine Physician of Record: No      Question Answer Comment   Physician of Record for Attribution - Please select from Treatment Team VALERIO FORBES    Review needed by CMO to determine Physician of Record No        05/07/25 1214

## 2025-05-11 LAB
BACTERIA SPEC AEROBE CULT: NORMAL
BACTERIA SPEC AEROBE CULT: NORMAL

## 2025-05-13 ENCOUNTER — READMISSION MANAGEMENT (OUTPATIENT)
Dept: CALL CENTER | Facility: HOSPITAL | Age: 65
End: 2025-05-13
Payer: MEDICARE

## 2025-05-13 NOTE — OUTREACH NOTE
Medical Week 1 Survey      Flowsheet Row Responses   [facility] patient discharged from? [facility]   Does the patient have one of the following disease processes/diagnoses(primary or secondary)? Other   Week 1 attempt successful? Yes   Call start time 1201   Call end time 1203   Discharge diagnosis Altered mental status   Person spoke with today (if not patient) and relationship Patient   Meds reviewed with patient/caregiver? Yes   Does the patient have all medications ordered at discharge? Yes   Prescription comments No concerns or questions noted.   Is the patient taking all medications as directed (includes completed medication regime)? Yes   Comments regarding appointments [facility] for Methadone   Does the patient have a primary care provider?  Yes   Does the patient have an appointment with their PCP within 7 days of discharge? Yes   Comments regarding PCP Sees pcp this afternoon   Has home health visited the patient within 72 hours of discharge? N/A   Psychosocial issues? No   Did the patient receive a copy of their discharge instructions? Yes   Nursing interventions Reviewed instructions with patient   What is the patient's perception of their health status since discharge? Improving   Is the patient/caregiver able to teach back signs and symptoms related to disease process for when to call PCP? Yes   Is the patient/caregiver able to teach back signs and symptoms related to disease process for when to call 911? Yes   Is the patient/caregiver able to teach back the hierarchy of who to call/visit for symptoms/problems? PCP, Specialist, Home health nurse, Urgent Care, ED, 911 Yes   Week 1 call completed? Yes   Graduated Yes   Wrap up additional comments Patient reports doing well. No concerns or questions noted.   Call end time 1203            Rosie TOVAR - Registered Nurse

## 2025-05-15 ENCOUNTER — PATIENT OUTREACH (OUTPATIENT)
Dept: OTHER | Facility: HOSPITAL | Age: 65
End: 2025-05-15
Payer: MEDICARE

## 2025-05-15 DIAGNOSIS — C50.912 MALIGNANT NEOPLASM OF LEFT BREAST IN FEMALE, ESTROGEN RECEPTOR POSITIVE, UNSPECIFIED SITE OF BREAST: Primary | ICD-10-CM

## 2025-05-15 DIAGNOSIS — Z17.0 MALIGNANT NEOPLASM OF LEFT BREAST IN FEMALE, ESTROGEN RECEPTOR POSITIVE, UNSPECIFIED SITE OF BREAST: Primary | ICD-10-CM

## 2025-05-15 NOTE — SIGNIFICANT NOTE
Met with patient and granddaughter with Dr. Goncalves to discuss new breast cancer diagnosis. Pathology of LG-IG IDC and DCIS. Surgical options of lumpectomy vs mastectomy reviewed and patient desires left mastectomy with delayed reconstruction (she currently is a smoker). Dr. Goncalves recommended MRI . Genetics and PT referral placed. NN reviewed community and educational resources. Patient encouraged to call with any questions or concerns.   05/15/25 1120   Nurse Navigation   Current Status Active   Type of Visit New patient   Location of Visit Surgeon's office   Visit Diagnosis Breast - malignant   Referral Source Health professional - outpatient   Treatments Surgery   Date of Diagnosis 05/05/25   Surgery - First Consult Appointment 05/15/25   Intervention Tasks Performed Education;Symptom management;Community resources;Cancer prevention/Screening;Supportive services referral   Supportive services referral Bioimpedance/Lymphedema;Genetics referral   Time Navigated Today (Min) 60   Total Time Navigated (Min) 60   Acuity Rating   Time spent with patient 3- greater than 45 minutes   Multimodality treatment coordination and education 2-  Arrange, transfer care, second opinion   Caregiver support 1- Family/significant other support available   Distress score 1- 0-3 clinical   Coordination of care  - appts 2- Multiple appts   Appt compliance 1- Compliant   ECOG/Karnofsky Score 1- ECOG -Less than or equal to 1,  Karnofsky 90 or greater   PHQ 9 score  1- <10   Referrals to support services 3- 2 or greater   Acuity score 15   Acuity level Medium acuity

## 2025-05-19 ENCOUNTER — HOSPITAL ENCOUNTER (OUTPATIENT)
Dept: MRI IMAGING | Facility: HOSPITAL | Age: 65
Discharge: HOME | End: 2025-05-19
Admitting: STUDENT IN AN ORGANIZED HEALTH CARE EDUCATION/TRAINING PROGRAM
Payer: MEDICARE

## 2025-05-19 DIAGNOSIS — C50.812 MALIGNANT NEOPLASM OF MIDLINE OF BREAST, LEFT: ICD-10-CM

## 2025-05-19 PROCEDURE — C8937 CAD BREAST MRI: HCPCS

## 2025-05-19 PROCEDURE — 77049 MRI BREAST C-+ W/CAD BI: CPT | Performed by: RADIOLOGY

## 2025-05-19 PROCEDURE — A9577 INJ MULTIHANCE: HCPCS | Performed by: STUDENT IN AN ORGANIZED HEALTH CARE EDUCATION/TRAINING PROGRAM

## 2025-05-19 PROCEDURE — 25510000002 GADOBENATE DIMEGLUMINE 529 MG/ML SOLUTION: Performed by: STUDENT IN AN ORGANIZED HEALTH CARE EDUCATION/TRAINING PROGRAM

## 2025-05-19 PROCEDURE — C8908 MRI W/O FOL W/CONT, BREAST,: HCPCS

## 2025-05-19 RX ADMIN — GADOBENATE DIMEGLUMINE 14 ML: 529 INJECTION, SOLUTION INTRAVENOUS at 10:37

## 2025-05-20 ENCOUNTER — CLINICAL SUPPORT (OUTPATIENT)
Dept: GENETICS | Facility: HOSPITAL | Age: 65
End: 2025-05-20
Payer: MEDICARE

## 2025-05-20 DIAGNOSIS — Z80.0 FAMILY HISTORY OF PANCREATIC CANCER: ICD-10-CM

## 2025-05-20 DIAGNOSIS — Z13.79 GENETIC TESTING: Primary | ICD-10-CM

## 2025-05-20 DIAGNOSIS — C50.912 MALIGNANT NEOPLASM OF LEFT BREAST IN FEMALE, ESTROGEN RECEPTOR POSITIVE, UNSPECIFIED SITE OF BREAST: ICD-10-CM

## 2025-05-20 DIAGNOSIS — Z17.0 MALIGNANT NEOPLASM OF LEFT BREAST IN FEMALE, ESTROGEN RECEPTOR POSITIVE, UNSPECIFIED SITE OF BREAST: ICD-10-CM

## 2025-05-20 NOTE — PROGRESS NOTES
Date of Visit: 2025   Patient Name: Joy Thornton  : 1960   MRN: [id number]     Referring Provider: Nick Goncalves MD     REASON FOR CONSULT  Joy Thornton is a 64 y.o. female referred for genetic counseling following her recent breast cancer diagnosis and due to a family history of pancreatic cancer.  Genetic counseling was provided via telehealth.  I confirmed the patient's name, date of birth, and Ms. Thornton confirmed that she was physically located in the [facility] at the time of the appointment.  Ms. Thornton was interested in discussing her risk for a hereditary cancer syndrome and genetic testing for cancer susceptibility.  In April of this year, Ms. 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  She is discussing surgical and treatment options with her care team currently.  She is desiring a left mastectomy only.  She has a past history of malignant melanoma at the age of 54.  Ms. Thornton elected to pursue genetic testing via [employer] CancerNext panel with [employer] analyzing 40-cancer risk genes.  Expedited analysis and results were ordered for the breast cancer risk genes.    PERTINENT FAMILY HISTORY:  A cancer-focused four-generation pedigree was obtained via patient reporting    Mother - pancreatic cancer, 46  Father - throat cancer, 82  Nephew - unilateral testicular cancer, 2    RISK ASSESSMENT  Ms. Thornton's breast cancer diagnosis in conjunction with her family history is suggestive of hereditary cancer risk.  Ms. Thornton meets NCCN guidelines criteria for genetic testing based on her breast cancer diagnosis in conjunction with her mother's early onset pancreatic cancer diagnosis.  A significant proportion (>50%) of hereditary breast and ovarian cancer can be attributed to mutations in the BRCA1 and BRCA2 genes.  Females with mutations in BRCA1/2  can have a  lifetime breast cancer risk up to 60 - 84%, while the general population risk for breast cancer is 12 - 13%.  BRCA1 mutations can also increase the lifetime risk for ovarian cancer up to 58% and BRCA2 mutations can increase this risk up to 29%.  The general population lifetime ovarian cancer risk is 1 - 2%.  BRCA1/2 mutations can also significantly increase the lifetime risk of certain cancers in males such as prostate cancer.  Mutations in these two genes can also increase the risk for pancreatic cancer and male breast cancer.  There are other clinically significant cancer related genes, as well as other, more rare, hereditary cancer syndromes than can increase risk for breast and ovarian cancers. The degree of risk and screening recommendations vary by gene, the individual's age, and related family history.    GENETIC COUNSELING  We reviewed the family history information in detail. Cases of cancer follow three general patterns: sporadic, familial, and hereditary.  While most cancer cases are sporadic (~70% of cancer cases), some cases appear to occur in family clusters.  These cases are said to be familial and account for around 20% of cancer cases.  Familial cases may be due to a combination of shared genes and environmental factors among family members that we cannot evaluate via genetic testing at this time.  In 5% - 10% of cancer cases, the risk for cancer is inherited, and the genes responsible for the increased cancer risk are known.      Family histories typical of hereditary cancer syndromes usually include multiple first- and second-degree relatives diagnosed with cancer types that define a syndrome.  These cases tend to be diagnosed at younger-than-expected ages and can be bilateral or multifocal.  The cancer in these families follows an autosomal dominant inheritance pattern, which indicates the likely presence of a mutation in a cancer gene.  Children and siblings of an individual carrying a mutation  "have a 50% chance of inheriting that mutation, thereby inheriting the increased risk to develop cancer.  However, it is not recommended to pursue genetic testing for adult-onset cancers in children as the results would not have clinical utility until some time in adulthood among other ethical reasons.  These mutations can be passed down from the maternal or the paternal lineage.    We discussed that risk factors or \"red flags\" for hereditary risk include cancers diagnosed at earlier-than-average ages, multiple family members diagnosed with cancers associated with mutations in the same gene, or multiple generations of associated cancers. Dependent on the specific cancer type or syndrome, there exists the potential for several clinically significant genes related related to the cancer's onset or increased risk for development.  Therefore, the standard approach to hereditary cancer genetic testing at this time is via multi-gene panel analyzing several genes associated with a hereditary risk for cancer.  Once results are completed, we will review them in the context of the patient's personal and family history to determine what, if any, post-test cancer risk management changes are recommended.  When affected relatives are unavailable or unwilling to pursue genetic testing, it is reasonable to offer genetic testing to an unaffected individual.      GENETIC TESTING  The risks, benefits, and limitations of genetic testing and implications for clinical management following testing were reviewed.  The implications of a positive, negative, or VUS test result were discussed.  Genetic test results can influence decisions regarding screening and prevention.  Genetic testing can have significant psychological implications for both individuals and families. Also discussed was the possibility of insurance discrimination based on genetic test results and the laws in place to prevent this, as well as the limitations of these laws.  " "    The Genetic Information Nondiscrimination Act (MAYO) is a federal law enacted in May 2008.  [place] prohibits discrimination on the basis of genetic information such as genetic test results with respect to health insurance and employment status.  Under Title 1 of MAYO, health insurance companies are prohibited from using an individual's genetic information to change premiums, drop or change an individual's coverage, or prevent coverage from being acquired.  Title 2 of MAYO prohibits employers from using genetic information in employment decisions such as, but not limited to, hiring, firing, promotions, pay, and job assignments.  [place]O protections do not protect against genetic discrimination by life insurance, long-term care or disability insurance,  service members, federal employees, those using the  Health Service, or those employed by a small business with fewer than 15 employees.    We discussed panel testing, which could involve testing numerous genes associated with increased cancer risk.  With multigene panel testing, it is not uncommon for a variant of uncertain significance (VUS) to be identified.  Variants are termed \"VUS\" when there is not sufficient evidence to classify the variant as a negative (not harmful) variant or a positive (harmful) mutation. Genetic testing labs work to reclassify all VUSs overtime and will issue an updated report when a reclassification occurs.  The majority (over 90%) of VUSs that are reclassified are found to be negative genetic variants, however a small percentage will be upgraded to a harmful mutation. Clinically, a VUS is treated as a negative result.  If genetic testing is negative or a variant of uncertain significance (VUS) is found, management should be guided by a family history-based risk assessment.  Medical care should not be altered based on a VUS result.  Genetic testing for other unaffected (never had cancer) relatives is not recommended for a " VUS.    We discussed that there are limitations to a negative genetic test.  If Ms. Thornton tests negative it could be for several different reasons such as:    The possibility that there is a pathogenic variant causing cancer in the family, and that gene was on the patient's test, but Ms. Thornton did not inherit it. We cannot know if this is the case by only testing the patient.  A familial mutation could therefore still be present in the family and other close family members are still at risk.  Ms. Thornton could have a pathogenic variant in one of the genes tested for that was not detected. Current testing will identify the overwhelming majority of pathogenic variants, but some are not detectable with current technology.   Ms. Thornton may carry a pathogenic variant in another gene associated with hereditary cancer predisposition that was not tested for, or the risk in the family may be due to other genetic factors that are not well understood.    ASSESSMENT AND PLAN  Ms. Thornton meets NCCN guidelines for genetic testing.  We reviewed the options for genetic testing including a multi-gene panel of high risk genes, a panel of genes with known management guidelines, or a broader approach including more newly discovered genes. We reviewed the benefits and drawbacks of this approach, including finding mutations in genes that are less well understood, or results that are unexpected based on this family history.  Ms. Thornton elected to pursue genetic testing.  [employer]'s CancerNext panel was ordered, which analyzes 40 genes associated with an increased cancer risk. The genes on this panel include APC, BRENDON, AXIN2, BAP1, BARD1, BMPR1A, BRCA1, BRCA2, BRIP1, CDH1, CDKN2A, CHEK2, EPCAM, FH, FLCN, GREM1, HOXB13, MBD4, MET, MLH1, MSH2, MSH3, MSH6, MUTYH, NF1, NTHL1, PALB2, PMS2, POLD1, POLE, PTEN, RAD51C, RAD51D, RPS20, SMAD4, STK11, TP53, TSC1, TSC2, and VHL.  A blood sample for genetic evaluation will be collected  on 5/21/2025.   Genetic testing results for the breast cancer genes are expected in 7-10 days from 5/21/2025.  Full panel results are expected in 2 - 4 weeks from 5/21/2025.  We will contact the patient as results are received.  Ms. Thornton asked excellent questions during the consult and did not demonstrate any acute psychosocial distress during our visit. This clinic encounter was 30 minutes.      Derian Schuster MS, [facility]  Genetic Counselor  [facility]

## 2025-05-21 ENCOUNTER — LAB (OUTPATIENT)
Dept: LAB | Facility: HOSPITAL | Age: 65
End: 2025-05-21
Payer: MEDICARE

## 2025-05-21 ENCOUNTER — TELEPHONE (OUTPATIENT)
Dept: MRI IMAGING | Facility: HOSPITAL | Age: 65
End: 2025-05-21
Payer: MEDICARE

## 2025-05-21 ENCOUNTER — CLINICAL SUPPORT (OUTPATIENT)
Dept: GENETICS | Facility: HOSPITAL | Age: 65
End: 2025-05-21
Payer: MEDICARE

## 2025-05-21 DIAGNOSIS — Z13.79 GENETIC TESTING: Primary | ICD-10-CM

## 2025-05-21 PROCEDURE — 36415 COLL VENOUS BLD VENIPUNCTURE: CPT

## 2025-05-21 NOTE — PROGRESS NOTES
Ms. Thornton presented for blood sample collection for genetic testing as discussed on 5/20/2025. Genetic test results are expected in 2-3 weeks.    Derian Schuster MS, [facility]  Genetic Counselor  [facility]

## 2025-05-28 ENCOUNTER — TELEPHONE (OUTPATIENT)
Dept: OTHER | Facility: HOSPITAL | Age: 65
End: 2025-05-28
Payer: MEDICARE

## 2025-05-28 NOTE — TELEPHONE ENCOUNTER
Pt called stating she had a breast MRI last week and hasn't heard anything from the surgeon's office about scheduling surgery. Pt states she would like a left breast mastectomy. NN contacted Keila at [facility], who schedules surgery for Dr. Goncalves. Keila will discuss with Dr. Goncalves.

## 2025-05-29 ENCOUNTER — TRANSCRIBE ORDERS (OUTPATIENT)
Dept: ADMINISTRATIVE | Facility: HOSPITAL | Age: 65
End: 2025-05-29
Payer: MEDICARE

## 2025-05-29 DIAGNOSIS — C50.512 MALIGNANT NEOPLASM OF LOWER-OUTER QUADRANT OF LEFT FEMALE BREAST, UNSPECIFIED ESTROGEN RECEPTOR STATUS: Primary | ICD-10-CM

## 2025-06-03 ENCOUNTER — TELEPHONE (OUTPATIENT)
Dept: GENETICS | Facility: HOSPITAL | Age: 65
End: 2025-06-03
Payer: MEDICARE

## 2025-06-03 ENCOUNTER — DOCUMENTATION (OUTPATIENT)
Dept: GENETICS | Facility: HOSPITAL | Age: 65
End: 2025-06-03
Payer: MEDICARE

## 2025-06-03 NOTE — PROGRESS NOTES
Ms. Thornton was contacted on 6/3/2025 to discuss results of genetic testing.   The high/moderate breast cancer risk portion of the panel was resulted out first in order to expedite surgical decision making, and testing found no pathogenic (harmful) mutations in BRENDON, BARD1, BRCA1/2, CHEK2, CDH1, NF1, PALB2, PTEN, RAD51C, RAD51D, STK11, and TP53.  The remainder of her 40-gene panel, including lower risk genes, non-breast cancer related genes, and RNA analysis is still pending, and the patient will be contacted once those results are available. Full summary note, pedigree, and results will be sent to patient, referring provider, and treating physicians once full panel results are back.        Derian Schuster MS, [facility]  Genetic Counselor  [facility]    Cc:  Nick Bardales MD Yates, Lindy CALVILLO RN

## 2025-06-04 ENCOUNTER — HOSPITAL ENCOUNTER (OUTPATIENT)
Dept: MAMMOGRAPHY | Facility: HOSPITAL | Age: 65
Discharge: HOME | End: 2025-06-04
Payer: MEDICARE

## 2025-06-04 ENCOUNTER — HOSPITAL ENCOUNTER (OUTPATIENT)
Dept: ULTRASOUND IMAGING | Facility: HOSPITAL | Age: 65
Discharge: HOME | End: 2025-06-04
Payer: MEDICARE

## 2025-06-04 ENCOUNTER — TELEPHONE (OUTPATIENT)
Dept: GENETICS | Facility: HOSPITAL | Age: 65
End: 2025-06-04
Payer: MEDICARE

## 2025-06-04 ENCOUNTER — PRE-ADMISSION TESTING (OUTPATIENT)
Dept: PREADMISSION TESTING | Facility: HOSPITAL | Age: 65
End: 2025-06-04
Payer: MEDICARE

## 2025-06-04 DIAGNOSIS — C50.512 MALIGNANT NEOPLASM OF LOWER-OUTER QUADRANT OF LEFT FEMALE BREAST, UNSPECIFIED ESTROGEN RECEPTOR STATUS: ICD-10-CM

## 2025-06-04 LAB
ANION GAP SERPL CALCULATED.3IONS-SCNC: 10 MMOL/L (ref 5–15)
BUN SERPL-MCNC: 6.8 MG/DL (ref 8–23)
BUN/CREAT SERPL: 10.5 (ref 7–25)
CALCIUM SPEC-SCNC: 9.1 MG/DL (ref 8.6–10.5)
CHLORIDE SERPL-SCNC: 101 MMOL/L (ref 98–107)
CO2 SERPL-SCNC: 28 MMOL/L (ref 22–29)
CREAT SERPL-MCNC: 0.65 MG/DL (ref 0.57–1)
DEPRECATED RDW RBC AUTO: 45 FL (ref 37–54)
EGFRCR SERPLBLD CKD-EPI 2021: 96.6 ML/MIN/1.73
ERYTHROCYTE [DISTWIDTH] IN BLOOD BY AUTOMATED COUNT: 12.6 % (ref 12.3–15.4)
GLUCOSE SERPL-MCNC: 73 MG/DL (ref 65–99)
HCT VFR BLD AUTO: 38.4 % (ref 34–46.6)
HGB BLD-MCNC: 12.2 G/DL (ref 12–15.9)
MCH RBC QN AUTO: 30.7 PG (ref 26.6–33)
MCHC RBC AUTO-ENTMCNC: 31.8 G/DL (ref 31.5–35.7)
MCV RBC AUTO: 96.5 FL (ref 79–97)
PLATELET # BLD AUTO: 217 10*3/MM3 (ref 140–450)
PMV BLD AUTO: 10.9 FL (ref 6–12)
POTASSIUM SERPL-SCNC: 4.3 MMOL/L (ref 3.5–5.2)
RBC # BLD AUTO: 3.98 10*6/MM3 (ref 3.77–5.28)
SODIUM SERPL-SCNC: 139 MMOL/L (ref 136–145)
WBC NRBC COR # BLD AUTO: 6.33 10*3/MM3 (ref 3.4–10.8)

## 2025-06-04 PROCEDURE — 25010000002 LIDOCAINE 1 % SOLUTION: Performed by: RADIOLOGY

## 2025-06-04 PROCEDURE — 85027 COMPLETE CBC AUTOMATED: CPT

## 2025-06-04 PROCEDURE — C1728 CATH, BRACHYTX SEED ADM: HCPCS

## 2025-06-04 PROCEDURE — 19285 PERQ DEV BREAST 1ST US IMAG: CPT | Performed by: RADIOLOGY

## 2025-06-04 PROCEDURE — 77065 DX MAMMO INCL CAD UNI: CPT | Performed by: RADIOLOGY

## 2025-06-04 PROCEDURE — 36415 COLL VENOUS BLD VENIPUNCTURE: CPT

## 2025-06-04 PROCEDURE — 80048 BASIC METABOLIC PNL TOTAL CA: CPT

## 2025-06-04 RX ORDER — LIDOCAINE HYDROCHLORIDE 10 MG/ML
10 INJECTION, SOLUTION INFILTRATION; PERINEURAL ONCE
Status: COMPLETED | OUTPATIENT
Start: 2025-06-04 | End: 2025-06-04

## 2025-06-04 RX ADMIN — LIDOCAINE HYDROCHLORIDE 10 ML: 10 INJECTION, SOLUTION INFILTRATION; PERINEURAL at 09:24

## 2025-06-04 NOTE — DISCHARGE INSTRUCTIONS
The following information and instructions were given:    Nothing to eat or drink after midnight except sips of water with routine prescribed medication (except blood thinner, certain blood pressure medications, diabetes, or weight reducing medication) unless otherwise instructed by your physician.  Do not eat, drink, smoke or chew gum after midnight the night before surgery. This also includes no mints.    EXCEPTION: ERAS patients Patient instructed to drink 20 ounces (or until full) of Gatorade and it needs to be completed 2 hours before given arrival time on the day of surgery. (NO RED Gatorade)    Patient verbalized understanding.    DO NOT shave for two days before your procedure.  Do not wear makeup.      DO NOT wear fingernail polish (gel/regular) and/or acrylic/artificial nails on the day of surgery.   If a patient had recent manicure and would rather not remove polish or artificial nails, then the minimum requirement is that the polish/artificial nails must be removed from the middle finger on each hand.      If patient was having surgery on an upper extremity, then the patient was instructed that fingernail polish/artificial fingernails must be removed for surgery.  NO EXCEPTIONS.      If patient was having surgery on a lower extremity, then the patient was instructed that toenail polish on both extremities must be removed for surgery.  NO EXCEPTIONS.    Remove all jewelry (advised to go to jeweler if unable to remove).  Jewelry especially rings can no longer be taped for surgery.    Leave anything you consider valuable at home.      Bring the following with you (if applicable)   -picture ID and insurance cards   -Co-pay/deductible required by insurance   -Medications in the original bottles OR a detailed list of medications including all over-the-counter meds     Patient's  must remain in the [facility] Waiting Room during the procedure.  Patient must have a  for transportation home after  procedure.  It must be an  adult that will take responsibility for care for 24 hours after surgery.

## 2025-06-04 NOTE — PAT
An arrival time for procedure was not provided during PAT visit. If patient had any questions or concerns about their arrival time, they were instructed to contact their surgeon/physician.  Additionally, if the patient referred to an arrival time that was acquired from their my chart account, patient was encouraged to verify that time with their surgeon/physician. Arrival times are NOT provided in Pre Admission Testing Department.     Patient denies any current skin issues.      [facility] pt instruction guide provided and reviewed with pt who verbalized understanding

## 2025-06-05 ENCOUNTER — HOSPITAL ENCOUNTER (OUTPATIENT)
Dept: PHYSICAL THERAPY | Facility: HOSPITAL | Age: 65
Setting detail: THERAPIES SERIES
Discharge: HOME | End: 2025-06-05
Payer: MEDICARE

## 2025-06-05 DIAGNOSIS — C50.912 MALIGNANT NEOPLASM OF LEFT BREAST IN FEMALE, ESTROGEN RECEPTOR POSITIVE, UNSPECIFIED SITE OF BREAST: Primary | ICD-10-CM

## 2025-06-05 DIAGNOSIS — Z17.0 MALIGNANT NEOPLASM OF LEFT BREAST IN FEMALE, ESTROGEN RECEPTOR POSITIVE, UNSPECIFIED SITE OF BREAST: Primary | ICD-10-CM

## 2025-06-05 PROCEDURE — 97162 PT EVAL MOD COMPLEX 30 MIN: CPT

## 2025-06-05 PROCEDURE — 93702 BIS XTRACELL FLUID ANALYSIS: CPT

## 2025-06-05 NOTE — THERAPY DISCHARGE NOTE
Outpatient Physical Therapy Lymphedema Initial Evaluation & Discharge  [facility]     Patient Name: Joy Thornton  : 1960  MRN: [id number]  Today's Date: 2025      Visit Date: 2025    Visit Dx:    ICD-10-CM ICD-9-CM   1. Malignant neoplasm of left breast in female, estrogen receptor positive, unspecified site of breast  C50.912 174.9    Z17.0 V86.0       Patient Active Problem List   Diagnosis    Chronic pain syndrome    COPD (chronic obstructive pulmonary disease)    Malignant neoplasm of female breast    Tobacco use        Past Medical History:   Diagnosis Date    Malignant neoplasm of female breast 2025        No past surgical history on file.         Lymphedema       Row Name 25 1300             Subjective Pain    Able to rate subjective pain? yes  -LF      Pre-Treatment Pain Level 0  -LF         Subjective    Subjective Comments Ms. Thornton presents to PT for pre-surgical assessment prior to her scheduled BrCA surgery on Monday. She states she will be having a lumpectomy, as well as radiation.  Reports mild  weakness, but otherwise no UE issues.  She has degenerative changes R ankle and anticipates having fusion surgery in the future. She ambulates with SC.  -LF         Lymphedema Assessment    Lymphedema Classification LUE:;at risk/stage 0  -LF      Lymphedema Surgery Comments surgery is scheduled for 25  -LF         Posture/Observations    Posture- WNL Posture is WNL  -LF         General ROM    RT Upper Ext Rt Shoulder ABduction;Rt Shoulder Flexion;Rt Shoulder External Rotation;Rt Shoulder Internal Rotation  -LF      LT Upper Ext Lt Shoulder Flexion;Lt Shoulder External Rotation;Lt Shoulder Internal Rotation;Lt Shoulder ABduction  -LF      GENERAL ROM COMMENTS WFL wrist/hand  -LF         Right Upper Ext    Rt Shoulder Abduction AROM WFL  -LF      Rt Shoulder Flexion AROM WFL  -LF      Rt Shoulder External Rotation AROM WFL  -LF      Rt Shoulder Internal Rotation  AROM WFL  -LF         Left Upper Ext    Lt Shoulder Abduction AROM WFL  -LF      Lt Shoulder Flexion AROM WFL  -LF      Lt Shoulder External Rotation AROM WFL  -LF      Lt Shoulder Internal Rotation AROM WFL  -LF         MMT (Manual Muscle Testing)    Rt Upper Ext Rt Shoulder Flexion;Rt Shoulder ABduction;Rt Shoulder Internal Rotation;Rt Shoulder External Rotation  -LF      Lt Upper Ext Lt Shoulder Flexion;Lt Shoulder ABduction;Lt Shoulder Internal Rotation;Lt Shoulder External Rotation  -LF         MMT Right Upper Ext    Rt Shoulder Flexion MMT, Gross Movement (5/5) normal  -LF      Rt Shoulder ABduction MMT, Gross Movement (5/5) normal  -LF      Rt Shoulder Internal Rotation MMT, Gross Movement (5/5) normal  -LF      Rt Shoulder External Rotation MMT, Gross Movement (5/5) normal  -LF         MMT Left Upper Ext    Lt Shoulder Flexion MMT, Gross Movement (5/5) normal  -LF      Lt Shoulder ABduction MMT, Gross Movement (5/5) normal  -LF      Lt Shoulder Internal Rotation MMT, Gross Movement (5/5) normal  -LF      Lt Shoulder External Rotation MMT, Gross Movement (5/5) normal  -LF         Hand  Strength     Strength Affected Side Bilateral  -LF          Strength Right    Right  Test 1 54  -LF      Right  Test 2 58  -LF      Right  Test 3 58  -LF       Strength Average Right 56.67  -LF          Strength Left    Left  Test 1 45  -LF      Left  Test 2 50  -LF      Left  Test 3 52  -LF       Strength Average Left 49  -LF         Lymphedema Edema Assessment    Edema Assessment Comment No edema present at this time.  -LF         Skin Changes/Observations    Location/Assessment Upper Extremity  -LF      Upper Extremity Conditions bilateral:;normal;intact  -LF      Upper Extremity Color/Pigment bilateral:;normal  -LF         Lymphedema Sensation    Lymphedema Sensation Reports RUE:;LUE:  -LF      Lymphedema Sensation Tests light touch  -LF      Lymphedema Light Touch  RUE:;LUE:;WNL  -LF         L-Dex Bioimpedence Screening    L-Dex Measurement Extremity RUE;LUE  -LF      L-Dex Patient Position Standing  -LF      L-Dex UE Dominate Side Right  -LF      L-Dex UE At Risk Side Left  -LF      L-Dex UE Pre Surgical Value Yes  -LF      L-Dex UE Score -2.1  -LF      L-Dex UE Comment The patient had SOZO measurement which I reviewed today. The score is in normal limits, see scanned to EMR. Bioimpedance spectroscopy helps identify the onset of lymphedema in an arm or leg before patients experience noticeable swelling. Research has shown that the early detection of lymphedema using L-Dex combined with treatment can reduce progression to chronic lymphedema by 95% in breast cancer patients. Whenever possible, patients are tested for baseline L-Dex score before cancer treatment begins and then are reassessed during regular follow-up visits using the SOZO device. Otherwise, this can be started postoperatively and continued during regular follow-up visits. If the patient’s L-Dex score increases above normal levels, that is a sign that lymphedema is developing and a referral is made to occupational or physical therapy for further evaluation and early compression treatment. Lymphedema assessment with the SOZO L-Dex score is recommended to be done every 3 months for the first 3 years and then every 6 months for years 4 and 5 followed by annually afterwards.  -LF                User Key  (r) = Recorded By, (t) = Taken By, (c) = Cosigned By      Initials Name Provider Type    Saranya Flores PT Physical Therapist                      Therapy Education  Education Details: Pt educated on prehab evaluation assessments including bioimpedance. Pt was provided an HEP detailing information including lymphedema, risk reduction, and post op exercise.  Given: HEP, Symptoms/condition management, Posture/body mechanics  Program: New  How Provided: Verbal, Written  Provided to: Patient  Level of  Understanding: Verbalized     OP Exercises       Row Name 06/05/25 1300             Subjective    Subjective Comments Ms. Thornton presents to PT for pre-surgical assessment prior to her scheduled BrCA surgery on Monday. She states she will be having a lumpectomy, as well as radiation.  Reports mild  weakness, but otherwise no UE issues.  She has degenerative changes R ankle and anticipates having fusion surgery in the future. She ambulates with SC.  -LF         Subjective Pain    Able to rate subjective pain? yes  -LF      Pre-Treatment Pain Level 0  -                User Key  (r) = Recorded By, (t) = Taken By, (c) = Cosigned By      Initials Name Provider Type    Saranya Flores PT Physical Therapist                     PT OP Goals       Row Name 06/05/25 1300          Long Term Goals    LTG Date to Achieve 06/05/25  -     LTG 1 Pt demonstrates awareness of post-operative movement restrictions and HEP to facilitate lymphatic regeneration and reduce the risk of seroma formation, axillary web syndrome, and lymphedema while ensuring shoulder joint mobility.  -     LTG 1 Progress Met  -     LTG 2 Pt demonstrates understanding of post-operative basic lymphedema precautions  -     LTG 2 Progress Met  -        Time Calculation    PT Goal Re-Cert Due Date 06/05/25  -               User Key  (r) = Recorded By, (t) = Taken By, (c) = Cosigned By      Initials Name Provider Type    Saranya Flores, PT Physical Therapist                     PT Assessment/Plan       Row Name 06/05/25 1300          PT Assessment    Assessment Comments This patient presents to PT pre-operatively for planned BrCA surgery scheduled next week. Baseline ROM, postural, and bioimpedance measurements were taken today to be compared to measurements retaken 3-4 weeks post surgery. At that time, any reduced movement, decline in function, or postural issues will be addressed with skilled care and new goals will be established.   Personal risk factors for lymphedema post-operatively for the left upper extremity and trunk quadrant were also assessed today and basic lymphedema precautions were discussed. A more detailed discussion regarding personal lymphedema risk factors can take place post-operatively once the number of lymph nodes removed and the plan for further medical care is known.  -LF     Please refer to paper survey for additional self-reported information Yes  -LF     Rehab Potential Good  -LF     Patient/caregiver participated in establishment of treatment plan and goals Yes  -LF     Patient would benefit from skilled therapy intervention Yes  -LF        PT Plan    PT Frequency One time visit  -LF     Planned CPT's? PT EVAL MOD COMPLELITY: 03394;PT BIS XTRACELL FLUID ANALYSIS: 09496  -LF     PT Plan Comments This patient may return to PT 3-4 weeks post operatively for re-evaluation measurements to be compared to measurements taken today, at her pre-operative evaluation. In addition, she can be examined for possible post-BrCA surgery sequelae such as axillary web syndrome, scar adhesions, edema, worsened posture, scapular winging, pain, and reduced ROM and function. At that time, a future plan and goals will be established and skilled care continued if indicated. Currently, she has been provided with information before BrCA surgery in order to facilitate recovery and reduce the risk of post-operative sequelae.  -LF               User Key  (r) = Recorded By, (t) = Taken By, (c) = Cosigned By      Initials Name Provider Type    Saranya Flores, PT Physical Therapist                     Outcome Measure Options: Quick DASH, Timed Up and Go (TUG)  Quick DASH  Open a tight or new jar.: Moderate Difficulty  Do heavy household chores (e.g., wash walls, wash floors): No Difficulty  Carry a shopping bag or briefcase: No Difficulty  Wash your back: No Difficulty  Use a knife to cut food: No Difficulty  Recreational activities in which  you take some force or impact through your arm, should or hand (e.g. golf, hammering, tennis, etc.): No Difficulty  During the past week, to what extent has your arm, shoulder, or hand problem interfered with your normal social activites with family, friends, neighbors or groups?: Not at all  During the past week, were you limited in your work or other regular daily activities as a result of your arm, shoulder or hand problem?: Not limited at all  Arm, Shoulder, or hand pain: None  Tingling (pins and needles) in your arm, shoulder, or hand: None  During the past week, how much difficulty have you had sleeping because of the pain in your arm, shoulder or hand?: No difficulty  Number of Questions Answered: 11  Quick DASH Score: 4.55         Time Calculation:   Start Time: 1300  Untimed Charges  PT Eval/Re-eval Minutes: 55  Total Minutes  Untimed Charges Total Minutes: 55   Total Minutes: 55     Therapy Charges for Today       Code Description Service Date Service Provider Modifiers Qty    25100773065 HC PT BIS XTRACELL FLUID ANALYSIS 6/5/2025 Saranya Ochoa, PT  1    88338219124 HC PT EVAL MOD COMPLEXITY 4 6/5/2025 Saranya Ochoa, PT GP 1            PT G-Codes  Outcome Measure Options: Quick DASH, Timed Up and Go (TUG)  Quick DASH Score: 4.55            Saranya Ochoa PT  6/5/2025

## 2025-06-06 ENCOUNTER — TRANSCRIBE ORDERS (OUTPATIENT)
Dept: ADMINISTRATIVE | Facility: HOSPITAL | Age: 65
End: 2025-06-06
Payer: MEDICARE

## 2025-06-06 ENCOUNTER — TELEPHONE (OUTPATIENT)
Dept: GENETICS | Facility: HOSPITAL | Age: 65
End: 2025-06-06
Payer: MEDICARE

## 2025-06-06 DIAGNOSIS — C50.512 MALIGNANT NEOPLASM OF LOWER-OUTER QUADRANT OF LEFT FEMALE BREAST, UNSPECIFIED ESTROGEN RECEPTOR STATUS: Primary | ICD-10-CM

## 2025-06-09 ENCOUNTER — HOSPITAL ENCOUNTER (OUTPATIENT)
Dept: MAMMOGRAPHY | Facility: HOSPITAL | Age: 65
Discharge: HOME | End: 2025-06-09
Payer: MEDICARE

## 2025-06-09 ENCOUNTER — HOSPITAL ENCOUNTER (OUTPATIENT)
Dept: NUCLEAR MEDICINE | Facility: HOSPITAL | Age: 65
Discharge: HOME | End: 2025-06-09
Payer: MEDICARE

## 2025-06-09 ENCOUNTER — LAB REQUISITION (OUTPATIENT)
Dept: LAB | Facility: HOSPITAL | Age: 65
End: 2025-06-09
Payer: MEDICARE

## 2025-06-09 DIAGNOSIS — C50.512 MALIGNANT NEOPLASM OF LOWER-OUTER QUADRANT OF LEFT FEMALE BREAST, UNSPECIFIED ESTROGEN RECEPTOR STATUS: ICD-10-CM

## 2025-06-09 DIAGNOSIS — C50.512 MALIGNANT NEOPLASM OF LOWER-OUTER QUADRANT OF LEFT FEMALE BREAST: ICD-10-CM

## 2025-06-09 PROCEDURE — 38792 RA TRACER ID OF SENTINL NODE: CPT

## 2025-06-09 PROCEDURE — 76098 X-RAY EXAM SURGICAL SPECIMEN: CPT

## 2025-06-09 PROCEDURE — 88307 TISSUE EXAM BY PATHOLOGIST: CPT | Performed by: STUDENT IN AN ORGANIZED HEALTH CARE EDUCATION/TRAINING PROGRAM

## 2025-06-09 PROCEDURE — 34310000005 TECHNETIUM FILTERED SULFUR COLLOID: Performed by: STUDENT IN AN ORGANIZED HEALTH CARE EDUCATION/TRAINING PROGRAM

## 2025-06-09 PROCEDURE — A9541 TC99M SULFUR COLLOID: HCPCS | Performed by: STUDENT IN AN ORGANIZED HEALTH CARE EDUCATION/TRAINING PROGRAM

## 2025-06-09 PROCEDURE — 88341 IMHCHEM/IMCYTCHM EA ADD ANTB: CPT | Performed by: STUDENT IN AN ORGANIZED HEALTH CARE EDUCATION/TRAINING PROGRAM

## 2025-06-09 PROCEDURE — 88342 IMHCHEM/IMCYTCHM 1ST ANTB: CPT | Performed by: STUDENT IN AN ORGANIZED HEALTH CARE EDUCATION/TRAINING PROGRAM

## 2025-06-09 RX ORDER — TRAMADOL HYDROCHLORIDE 50 MG/1
50 TABLET ORAL EVERY 6 HOURS PRN
Qty: 15 TABLET | Refills: 0 | Status: SHIPPED | OUTPATIENT
Start: 2025-06-09 | End: 2026-06-09

## 2025-06-09 RX ADMIN — TECHNETIUM TC 99M SULFUR COLLOID 1 DOSE: KIT at 10:40

## 2025-06-13 ENCOUNTER — TELEPHONE (OUTPATIENT)
Dept: GENETICS | Facility: HOSPITAL | Age: 65
End: 2025-06-13
Payer: MEDICARE

## 2025-06-13 LAB
CYTO UR: NORMAL
LAB AP CASE REPORT: NORMAL
LAB AP CLINICAL INFORMATION: NORMAL
LAB AP DIAGNOSIS COMMENT: NORMAL
LAB AP SYNOPTIC CHECKLIST: NORMAL
PATH REPORT.FINAL DX SPEC: NORMAL
PATH REPORT.GROSS SPEC: NORMAL

## 2025-06-17 ENCOUNTER — HOSPITAL ENCOUNTER (OUTPATIENT)
Facility: HOSPITAL | Age: 65
Setting detail: RADIATION/ONCOLOGY SERIES
End: 2025-06-17
Payer: MEDICARE

## 2025-06-18 ENCOUNTER — APPOINTMENT (OUTPATIENT)
Age: 65
End: 2025-06-18
Payer: MEDICARE

## 2025-06-19 ENCOUNTER — TELEPHONE (OUTPATIENT)
Dept: GENETICS | Facility: HOSPITAL | Age: 65
End: 2025-06-19
Payer: MEDICARE

## 2025-06-19 ENCOUNTER — HOSPITAL ENCOUNTER (OUTPATIENT)
Dept: RADIATION ONCOLOGY | Facility: HOSPITAL | Age: 65
Setting detail: RADIATION/ONCOLOGY SERIES
Discharge: HOME | End: 2025-06-19
Payer: MEDICARE

## 2025-06-19 ENCOUNTER — OFFICE VISIT (OUTPATIENT)
Dept: RADIATION ONCOLOGY | Facility: HOSPITAL | Age: 65
End: 2025-06-19
Payer: MEDICARE

## 2025-06-19 VITALS
WEIGHT: 190 LBS | DIASTOLIC BLOOD PRESSURE: 70 MMHG | RESPIRATION RATE: 16 BRPM | SYSTOLIC BLOOD PRESSURE: 156 MMHG | HEIGHT: 64 IN | OXYGEN SATURATION: 96 % | BODY MASS INDEX: 32.44 KG/M2 | HEART RATE: 59 BPM | TEMPERATURE: 96 F

## 2025-06-19 DIAGNOSIS — C50.412 MALIGNANT NEOPLASM OF UPPER-OUTER QUADRANT OF LEFT BREAST IN FEMALE, ESTROGEN RECEPTOR POSITIVE: Primary | ICD-10-CM

## 2025-06-19 DIAGNOSIS — Z17.0 MALIGNANT NEOPLASM OF UPPER-OUTER QUADRANT OF LEFT BREAST IN FEMALE, ESTROGEN RECEPTOR POSITIVE: Primary | ICD-10-CM

## 2025-06-19 PROCEDURE — G0463 HOSPITAL OUTPT CLINIC VISIT: HCPCS

## 2025-06-19 NOTE — PROGRESS NOTES
CONSULTATION NOTE    NAME:      Joy Thornton  :                                                          1960  DATE OF CONSULTATION:                       25  REQUESTING PHYSICIAN:                   Nick Goncalves MD  REASON FOR CONSULTATION:           Further evaluation management of the patient's invasive ductal carcinoma of the breast for consideration of adjuvant radiation treatment at the request of Dr. Goncalves.           BRIEF HISTORY:  Joy Thornton  is a very pleasant 65 y.o. female who initially presented with an abnormal screening mammogram.  The patient had a mammogram in 2025 that showed a left upper outer quadrant 1.5 cm mass with another satellite mass.  This was confirmed on ultrasound.  The patient then had a left breast biopsy in 2025.  The patient had a lesion at 1:00 4 cm from the nipple that was consistent with invasive ductal carcinoma low-grade.  The patient also had another 0.5 cm lesion seen on ultrasound that was biopsy was consistent with ductal carcinoma.  The patient had an MRI on 2025 that showed 2 areas of concern in the left breast.  The patient did not have any additional lesions in the breast.  There were no concerning lymph nodes identified on that exam.  Patient was then taken for a left breast lumpectomy with Dr. Goncalves on 2025.  The patient was found to have a [id number] (MI) invasive ductal carcinoma ER/ID positive HER2 negative grade 2 malignancy.  The patient had widely negative margins.  On x-ray evaluation of the patient's specimen 2 clips were identified.    The patient has been recovering since and is not seeing Dr. Goncalves back yet.  The patient reports that she is very pleased with how well she doing following surgery.  She reports that she has some bruising but is overall recovering well.  She reports that she has no arm swelling and good mobility.  Patient reports that she works part-time and has good flexibility at her  "job.      BMI:  Body mass index is 32.61 kg/m².      Social History     Substance and Sexual Activity   Alcohol Use Not Currently       No Known Allergies    Social History     Tobacco Use    Smoking status: Former     Average packs/day: 3.0 packs/day for 45.4 years (136.3 ttl pk-yrs)     Types: Cigarettes     Start date:      Passive exposure: Current    Smokeless tobacco: Never   Vaping Use    Vaping status: Some Days    Substances: THC, CBD   Substance Use Topics    Alcohol use: Not Currently    Drug use: Not Currently     Types: Marijuana     Comment: history of addition to rx pain pills; on methadone now         Past Medical History:   Diagnosis Date    Malignant neoplasm of female breast 2025       family history includes Esophageal cancer (age of onset: 82) in her father; No Known Problems in her brother, daughter, maternal aunt, maternal grandmother, paternal aunt, paternal grandmother, sister, and son; Pancreatic cancer (age of onset: 45) in her mother; Testicular cancer (age of onset: 2) in her nephew.     Past Surgical History:   Procedure Laterality Date     SECTION      x2    CHOLECYSTECTOMY  1998    CLOSED REDUCTION ANKLE FRACTURE  1986    REPLACEMENT TOTAL KNEE  2018        Review of Systems - Oncology      A full 14 point review of systems was performed and was negative except as noted in the HPI.    Objective   VITAL SIGNS:   Vitals:    25 1056   BP: 156/70   Pulse: 59   Resp: 16   Temp: 96 °F (35.6 °C)   TempSrc: Temporal   SpO2: 96%   Weight: 86.2 kg (190 lb)   Height: 162.6 cm (64\")   PainSc: 0-No pain        KPS       80%    Physical Exam  Vitals and nursing note reviewed.   Constitutional:       Appearance: She is well-developed.   HENT:      Head: Normocephalic and atraumatic.   Eyes:      Conjunctiva/sclera: Conjunctivae normal.      Pupils: Pupils are equal, round, and reactive to light.   Neck:      Comments: No obviously enlarged cervical or supraclavicular " LAD.  Cardiovascular:      Comments: Patient well perfused. Non cyanotic. No prominent JVD. No pedal edema  Pulmonary:      Effort: Pulmonary effort is normal.      Breath sounds: Normal breath sounds. No stridor. No wheezing.   Abdominal:      General: There is no distension.      Palpations: Abdomen is soft.   Musculoskeletal:         General: Normal range of motion.      Cervical back: Normal range of motion and neck supple.      Comments: Patient moves all extremities spontaneously.   No arm edema.  Range of motion intact.   Skin:     General: Skin is warm and dry.      Comments: Breast exam deferred today given how recent her surgery was.   Neurological:      Mental Status: She is alert and oriented to person, place, and time.      Comments: Coordination intact.   Psychiatric:         Behavior: Behavior normal.         Thought Content: Thought content normal.         Judgment: Judgment normal.              The following portions of the patient's history were reviewed and updated as appropriate: allergies, current medications, past family history, past medical history, past social history, past surgical history, and problem list.  MRI Breast Bilateral Diagnostic W WO Contrast  Result Date: 5/19/2025  BI-RADS 6 known biopsy-proven malignancy left breast. Mammographic and tomographic findings corresponding to findings on MRI without MRI evidence of other sites of disease in either the right or left breast. If breast conservation is to be considered bracket localization is recommended.  RECOMMENDATIONS: Surgical and oncology follow-up  BI-RADS CATEGORY: 6 known biopsy-proven malignancy left breast    5/19/2025 12:07 PM by Dr. Sara Linton MD on Workstation: [id number]      US Guided Breast Biopsy With & Without Device Each Additional  Addendum Date: 5/6/2025  ADDENDUM: The patient was admitted to the ICU before she could be informed of her pathology results. Her referring provider's office was contacted by  Rachel Medina (breast care nurse) on 5/6/2025 with the final results and recommendations.  5/6/2025 5:09 PM by Dr. Harika Suarez MD on Workstation: [id number]      Result Date: 5/6/2025  Pathology results are concordant with imaging.  RECOMMENDATION: Surgical consultation and preoperative breast MRI imaging  The patient will be called with final biopsy results and recommendations by our breast care nurse.    5/5/2025 1:33 PM by Dr. Harika Suarez MD on Workstation: [id number]      US Guided Breast Biopsy With & Without Device initial  Addendum Date: 5/6/2025  ADDENDUM: The patient was admitted to the ICU before she could be informed of her pathology results. Her referring provider's office was contacted by Rachel Medina (breast care nurse) on 5/6/2025 with the final results and recommendations.  5/6/2025 5:09 PM by Dr. Harika Suarez MD on Workstation: [id number]      Result Date: 5/6/2025  Pathology results are concordant with imaging.  RECOMMENDATION: Surgical consultation and preoperative breast MRI imaging  The patient will be called with final biopsy results and recommendations by our breast care nurse.    5/5/2025 1:33 PM by Dr. Harika Suarez MD on Workstation: [id number]      Mammo Post Device Placement Left  Addendum Date: 5/6/2025  ADDENDUM: The patient was admitted to the ICU before she could be informed of her pathology results. Her referring provider's office was contacted by Rachel Medina (breast care nurse) on 5/6/2025 with the final results and recommendations.  5/6/2025 5:09 PM by Dr. Harika Suarez MD on Workstation: [id number]      Result Date: 5/6/2025  Pathology results are concordant with imaging.  RECOMMENDATION: Surgical consultation and preoperative breast MRI imaging  The patient will be called with final biopsy results and recommendations by our breast care nurse.    5/5/2025 1:33 PM by Dr. Harika Suarez MD on Workstation: [id number]      CT Angiogram Chest  Result Date:  5/6/2025  Impression: No evidence of pulmonary embolism. New/increased in conspicuity compared to same day exam is bronchial wall thickening with scattered mucoid impaction within the bilateral lower lobes, with left basilar micronodularity as well as a small peripheral consolidation in the lingula. Findings suspected to related to aspiration with subsequent endobronchial and downstream infection/inflammation. Electronically Signed: Donato Ocampo MD  5/6/2025 12:27 PM EDT  Workstation ID: [id number]    CT Head Without Contrast  Result Date: 5/6/2025  Impression: No acute intracranial findings are evident, within the confines of motion degradation. Electronically Signed: Donato Ocampo MD  5/6/2025 9:17 AM EDT  Workstation ID: [id number]    CT Chest Without Contrast Diagnostic  Result Date: 5/6/2025  Impression: No acute intrathoracic findings. Motion-degraded exam. Electronically Signed: Donato Ocampo MD  5/6/2025 9:13 AM EDT  Workstation ID: [id number]    Mammo Diagnostic Digital Tomosynthesis Bilateral With CAD  Result Date: 4/16/2025  1. The palpable mass the patient feels corresponds to a dominant 1.5 cm mass with associated pleomorphic calcifications which span 2 cm posterior to this located at 1:00, 4 cm from the. There are 2 additional satellite masses measuring 0.5 cm in size respectively. Altogether these masses span over 4.5 cm of tissue from anterior to posterior. There is no evidence of axillary adenopathy.  2. Negative right breast mammogram.  RECOMMENDATION: Recommend ultrasound-guided core biopsy of the dominant 1.5 cm mass in the left 1:00 distribution. As well as the adjacent 0.5 cm mass which is located the farthest away from the dominant mass.  ACR BI-RADS CATEGORY: 5, HIGHLY SUGGESTIVE OF MALIGNANCY  CAD was utilized.  The standard false-negative rate of mammography is between 10% and 25%. Complex patterns or increased breast density will markedly elevate the false-negative rate of mammography.    A  letter, in lay terminology, with the results of this exam was given to the patient at the time of the visit.  At our facility, a triangular marker is positioned over a palpable area of concern indicated by the patient. A [place] marker is placed over a visible skin lesion. A linear marker indicates a scar.  __________________________________________________________ Physician Order  Ultrasound Guided Breast Biopsy  Diagnosis: Abnormal Mammogram  4/16/2025 3:20 PM by Dr. Katt Freitas MD on Workstation: [id number]      US Breast Left Limited  Result Date: 4/16/2025  1. The palpable mass the patient feels corresponds to a dominant 1.5 cm mass with associated pleomorphic calcifications which span 2 cm posterior to this located at 1:00, 4 cm from the. There are 2 additional satellite masses measuring 0.5 cm in size respectively. Altogether these masses span over 4.5 cm of tissue from anterior to posterior. There is no evidence of axillary adenopathy.  2. Negative right breast mammogram.  RECOMMENDATION: Recommend ultrasound-guided core biopsy of the dominant 1.5 cm mass in the left 1:00 distribution. As well as the adjacent 0.5 cm mass which is located the farthest away from the dominant mass.  ACR BI-RADS CATEGORY: 5, HIGHLY SUGGESTIVE OF MALIGNANCY  CAD was utilized.  The standard false-negative rate of mammography is between 10% and 25%. Complex patterns or increased breast density will markedly elevate the false-negative rate of mammography.    A letter, in lay terminology, with the results of this exam was given to the patient at the time of the visit.  At our facility, a triangular marker is positioned over a palpable area of concern indicated by the patient. A [place] marker is placed over a visible skin lesion. A linear marker indicates a scar.  __________________________________________________________ Physician Order  Ultrasound Guided Breast Biopsy  Diagnosis: Abnormal Mammogram  4/16/2025 3:20 PM by Dr. Bolivar  MD [place] on Workstation: [id number]      Reviewed the patient's biopsies the ultrasound in April.  I reviewed the patient's lumpectomy specimen pathology in June.    Assessment      IMPRESSION:     Patient is a very pleasant 65-year-old female with a left breast invasive ductal carcinoma grade 2 [id number] (mi) ER/VT positive HER2 negative malignancy status postlumpectomy with Dr. Goncalves on 6/9/2025.  The patient had widely negative margins, negative LVSI, and minimal other risk features at the time of her surgery.  The patient is recovering very well from her surgery.  We discussed options for adjuvant management at this time.  The patient will be seeing Dr. Peter discussed hormonal therapy plus or minus chemotherapy on the 26th.  Today we discussed adjuvant radiation options.  We discussed the following lumpectomy radiation would be a good option for her.  We discussed anticipated acute, subacute, chronic side effects with course of radiotherapy.  The patient would likely benefit from whole breast radiotherapy.  I recommend 15 fractions dose of 42.5 Gray delivered daily.  The patient would need DIBH.  We would deliver the radiation following treatment with chemotherapy if indicated.  The patient will discuss further options with Dr. Peter next week.    I would like to touch base with the patient after she discusses things with Dr. Peter.    Greater than 1 hour was spent preparing for and coordinating this visit. >50% of the time was spent in direct face to face conversation with the patient teaching, answering question, and providing explanations regarding the patient's case.  The decision to treat the patient with radiation is a complex one and carries the risk of long-term side effects and complications.  The patient's malignancy represents a complicated life threatening condition that requires complex multidisciplinary management for treatment and followup.        RECOMMENDATIONS:    Left breast base of  ductal carcinoma  - Grade 2  - pT1c: 1.8 cm  - ER/VT positive HER2 negative  - PN1 (MI) in 1 of 2 sentinel lymph nodes  -S/p left lumpectomy  - Will discuss adjuvant options for treatment with Dr. Peter  - Would likely recommend adjuvant radiotherapy 42.5 Gray in 15 fractions  - Will follow-up with Dr. Goncalves in the near future  - Will follow-up with Dr. Peter in the near future  - I will follow-up with the patient after she discusses things with Dr. Peter.           Elliott Borrero MD        Errors in dictation may reflect use of voice recognition software and not all errors in transcription may have been detected prior to signing.

## 2025-06-25 ENCOUNTER — HOSPITAL ENCOUNTER (OUTPATIENT)
Facility: HOSPITAL | Age: 65
Setting detail: RADIATION/ONCOLOGY SERIES
End: 2025-06-25
Payer: MEDICARE

## 2025-06-26 ENCOUNTER — DOCUMENTATION (OUTPATIENT)
Dept: GENETICS | Facility: HOSPITAL | Age: 65
End: 2025-06-26
Payer: MEDICARE

## 2025-06-26 ENCOUNTER — CONSULT (OUTPATIENT)
Dept: ONCOLOGY | Facility: CLINIC | Age: 65
End: 2025-06-26
Payer: MEDICARE

## 2025-06-26 VITALS
DIASTOLIC BLOOD PRESSURE: 70 MMHG | BODY MASS INDEX: 33.31 KG/M2 | OXYGEN SATURATION: 92 % | SYSTOLIC BLOOD PRESSURE: 156 MMHG | HEIGHT: 64 IN | HEART RATE: 65 BPM | TEMPERATURE: 96.9 F | WEIGHT: 195.1 LBS

## 2025-06-26 DIAGNOSIS — C50.912 MALIGNANT NEOPLASM OF LEFT BREAST IN FEMALE, ESTROGEN RECEPTOR POSITIVE, UNSPECIFIED SITE OF BREAST: Primary | ICD-10-CM

## 2025-06-26 DIAGNOSIS — Z17.0 MALIGNANT NEOPLASM OF LEFT BREAST IN FEMALE, ESTROGEN RECEPTOR POSITIVE, UNSPECIFIED SITE OF BREAST: Primary | ICD-10-CM

## 2025-06-26 DIAGNOSIS — Z78.0 POSTMENOPAUSAL: ICD-10-CM

## 2025-06-26 NOTE — LETTER
2025     Nick Goncalves MD  [address]  [address]  [facility] [address]    Patient: Joy Thornton   YOB: 1960   Date of Visit: 2025     Dear Nick Goncalves MD:       Thank you for referring Joy Thornton to me for evaluation. Below are the relevant portions of my assessment and plan of care.    If you have questions, please do not hesitate to call me. I look forward to following Joy along with you.         Sincerely,        Jacquelyn Peter MD        CC: MD Elliott Sarmiento MD Jackson, Amie E, MD  25  Sign when Signing Visit    H[facility]ology [facility] [place]  Office number [phone]    Fax number [phone]     New Patient Office Visit      Date: 2025     Patient Name: Joy Thornton  MRN: [id number]  : 1960    Referring Physician: Dr. Nick Goncalves MD    Chief Complaint: Left breast cancer    Cancer Staging:      History of Present Illness: Joy Thornton is a pleasant 65 y.o. female who presents today for evaluation of left breast cancer.    She presented with a palpable mass underwent diagnostic workup.  Diagnostic mammogram 2025 demonstrated a 1.5 cm irregular mass in the anterior upper quadrant of the left breast correlating to her area of palpable concern.  1 cm from the mass is an irregular isodense mass spanning 0.5 cm.  Ultrasound showed the larger dominant mass at 1:00 spanning 1.5 cm and the smaller lateral mass spanning 0.4 cm.  No abnormal axillary lymph nodes.  Yeah maybe  Left breast 1:00 0.5 cm oval mass ultrasound-guided biopsy showed grade 1 invasive ductal carcinoma with associated DCIS (ER 80%, VA 66%, HER2 negative, 0+).  Left breast 1:00 1.5 cm irregular mass 4 cm from the nipple ultrasound-guided biopsy showed grade 2 invasive ductal carcinoma with associated DCIS (ER 90%, VA 90%, HER2 negative, 0+).  Sure  She underwent left breast lumpectomy and sentinel lymph node biopsy on  2025.  Lumpectomy pathology demonstrated grade 2 invasive ductal carcinoma with associated DCIS.  Invasive component spanned 1.8 cm with negative margins.  [place] lymph node positive for micrometastasis spanning 0.6 mm in 1 of 2 sentinel nodes.    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 This is negative for clinically significant symptoms except as noted below. This document has been scanned into the patient's chart.    Past Medical History:   Past Medical History:   Diagnosis Date   • Malignant neoplasm of female breast 2025   Osteoarthritis  No personal history of myocardial infarction, cerebrovascular event, or venous thromboembolism.  Basal cell carcinoma of the face    Past Surgical History:   Past Surgical History:   Procedure Laterality Date   •  SECTION      x2   • CHOLECYSTECTOMY     • CLOSED REDUCTION ANKLE FRACTURE     • REPLACEMENT TOTAL KNEE  2018       Family History:   Family History   Problem Relation Age of Onset   • Pancreatic cancer Mother 45   • Esophageal cancer Father 82   • No Known Problems Sister    • No Known Problems Brother    • No Known Problems Maternal Aunt    • No Known Problems Paternal Aunt    • No Known Problems Maternal Grandmother    • No Known Problems Paternal Grandmother    • No Known Problems Daughter    • No Known Problems Son    • Testicular cancer Nephew 2   • BRCA 1/2 Neg Hx    • Breast cancer Neg Hx    • Colon cancer Neg Hx    • Endometrial cancer Neg Hx        Social History:   Social History     Socioeconomic History   • Marital status:    Tobacco Use   • Smoking status: Former     Average packs/day: 3.0 packs/day for 45.4 years (136.3 ttl pk-yrs)     Types: Cigarettes      "Start date:      Passive exposure: Current   • Smokeless tobacco: Never   Vaping Use   • Vaping status: Some Days   • Substances: THC, CBD   Substance and Sexual Activity   • Alcohol use: Not Currently   • Drug use: Not Currently     Types: Marijuana     Comment: history of addition to rx pain pills; on methadone now   • Sexual activity: Defer     last year.    Medications:     Current Outpatient Medications:   •  Fluticasone-Umeclidin-Vilant (Trelegy Ellipta) 100-62.5-25 MCG/ACT inhaler, Inhale 1 puff Daily., Disp: 60 each, Rfl: 1  •  gabapentin (NEURONTIN) 600 MG tablet, Take 1 tablet by mouth 4 (Four) Times a Day., Disp: , Rfl:   •  methadone (DOLOPHINE) 5 MG/5ML solution, Take 105 mL by mouth Daily., Disp: , Rfl:   •  naloxone (NARCAN) 4 MG/0.1ML nasal spray, Call 911. Don't prime. Spencer in 1 nostril for overdose. Repeat in 2-3 minutes in other nostril if no or minimal breathing/responsiveness., Disp: 2 each, Rfl: 0  •  nicotine (NICODERM CQ) 21 MG/24HR patch, Place 1 patch on the skin as directed by provider Daily. (Patient not taking: Reported on 2025), Disp: 30 patch, Rfl: 1  •  nicotine polacrilex (COMMIT) 2 MG lozenge, Dissolve 1 lozenge in the mouth Every 2 (Two) Hours As Needed for Smoking Cessation. (Patient not taking: Reported on 2025), Disp: 108 each, Rfl: 1  •  traMADol (Ultram) 50 MG tablet, Take 1 tablet by mouth Every 6 (Six) Hours As Needed for Moderate Pain or Severe Pain. (Patient not taking: Reported on 2025), Disp: 15 tablet, Rfl: 0    Allergies:   No Known Allergies    Objective     Vital Signs:   Vitals:    25 1126   BP: 156/70   Pulse: 65   Temp: 96.9 °F (36.1 °C)   TempSrc: Infrared   SpO2: 92%   Weight: 88.5 kg (195 lb 1.6 oz)   Height: 162.6 cm (64.02\")   PainSc: 0-No pain    Body mass index is 33.47 kg/m².   Pain Score    25 1126   PainSc: 0-No pain       ECOG Performance Status: 0 - Asymptomatic    Physical Exam:   General: No acute distress. " Well appearing   HEENT: Normocephalic, atraumatic. Sclera anicteric.   Neck: supple, no adenopathy.   Cardiovascular: regular rate and rhythm. No murmurs.   Respiratory: Normal rate. Clear to auscultation bilaterally  Abdomen: Soft, nontender, non distended with normoactive bowel sounds  Lymph: no cervical, supraclavicular or axillary adenopathy  Neuro: Alert and oriented x 3. No focal deficits.   Ext: Symmetric, no swelling.   Psych: Euthymic      Laboratory/Imaging Reviewed:   No visits with results within 2 Week(s) from this visit.   Latest known visit with results is:   Lab Requisition on 06/09/2025   Component Date Value Ref Range Status   • Case Report 06/09/2025    Final                    Value:Surgical Pathology Report                         Case: [id number]                                  Authorizing Provider:  Nick Goncalves MD         Collected:           06/09/2025 11:50 AM          Ordering Location:     [facility]   Received:            06/09/2025 12:19 PM                                 LABORATORY                                                                   Pathologist:           Jerson Arcos MD                                                       Specimens:   1) - Axilla, Left                                                                                   2) - Axilla, Left                                                                                   3) - Breast, Left, short stitch superior; long stitch lateral; double stitch deep         • Clinical Information 06/09/2025    Final                    Value:Malignant neoplasm of lower-outer quadrant of left female breast     • Final Diagnosis 06/09/2025    Final                    Value:1.  LEFT AXILLARY SENTINEL LYMPH NODE #1, BIOPSY:  One lymph node, positive for micrometastasis (pN1mi) on immunohistochemical stain, see Comment    2.  LEFT AXILLARY SENTINEL LYMPH NODE #2, BIOPSY:  One lymph node, negative for  metastatic carcinoma (0/1), see Comment    3.  LEFT BREAST, LUMPECTOMY:  Invasive ductal carcinoma, Loyal Grade 2, with associated intermediate-grade ductal carcinoma in situ (DCIS)  Invasive carcinoma measures 1.8 cm in greatest dimension, pT1c  The surgical resection margins are free of carcinoma  Please see CAP staging protocol below for full report         • Synoptic Checklist 06/09/2025    Final                    Value:INVASIVE CARCINOMA OF THE BREAST: Resection                            INVASIVE CARCINOMA OF THE BREAST: RESECTION - All Specimens                            8th Edition - Protocol posted: 6/19/2024                                                        SPECIMEN                               Procedure:    Excision (less than total mastectomy)                                Specimen Laterality:    Left                                                         TUMOR                               Tumor Site:    Not specified                                Histologic Type:    Invasive carcinoma of no special type (ductal)                                Histologic Grade (Loyal Histologic Score):                                     Glandular (Acinar) / Tubular Differentiation:    Score 3                                  Nuclear Pleomorphism:    Score 2                                  Mitotic Rate:    Score 1                                  Overall Grade:    Grade 2 (scores of 6 or 7)                                Tumor Size:    Greatest dimension of largest invasive focus (Millimeters): 18 mm                               Ductal Carcinoma In Situ (DCIS):    Present                                  :    Negative for extensive intraductal component (EIC)                                  Architectural Patterns:    Solid                                  Nuclear Grade:    Grade I (low)                                  Necrosis:    Not identified                                Lymphatic and / or  Vascular Invasion:    Not identified                                Dermal Lymphatic and / or Vascular Invasion:    Not identified                                Microcalcifications:    Not identified                                Treatment Effect in the Breast:    No known presurgical therapy                                                         MARGINS                               Margin Status for Invasive Carcinoma:    All margins negative for invasive carcinoma                                  Distance from Invasive Carcinoma to Closest Margin:    5 mm                                 Closest Margin(s) to Invasive Carcinoma:    Anterior                                Margin Status for DCIS:    All margins negative for DCIS                                  Distance from DCIS to Closest Margin:    5 mm                                 Closest Margin(s) to DCIS:    Posterior                                                         REGIONAL LYMPH NODES                               Regional Lymph Node Status:                                     :    Tumor present in regional lymph node(s)                                    Number of Lymph Nodes with Macrometastases:    0                                    Number of Lymph Nodes with Micrometastases:    1                                    Number of Lymph Nodes with Isolated Tumor Cells:    0                                    Size of Largest Orion Metastatic Deposit:    0.6 mm                                   Extranodal Extension:    Not identified                                  Total Number of Lymph Nodes Examined (sentinel and non-sentinel):    2                                  Number of [place] Nodes Examined:    2                                                         pTNM CLASSIFICATION (AJCC 8th Edition)                               Reporting of pT, pN, and (when applicable) pM categories is based on information available to the pathologist at the time  "the report is issued. As per the AJCC (Chapter 1, 8th Ed.) it is the managing physician's responsibility to establish the final pathologic stage based upon all pertinent information, including but potentially not limited to this pathology report.                               pT Category:    pT1c                                pN Category:    pN1mi                                                         SPECIAL STUDIES                               Estrogen Receptor (ER) Status:    Positive (greater than 10% of cells demonstrate nuclear positivity)                                  Percentage of Cells with Nuclear Positivity:    81-90%                                Progesterone Receptor (PgR) Status:    Positive                                  Percentage of Cells with Nuclear Positivity:    81-90%                                HER2 (by immunohistochemistry):    Negative (Score 0)      • Comment 06/09/2025    Final                    Value:Immunohistochemical stains, with appropriately reactive controls, were performed on blocks 1A, 1B, 2A, 2B, and 2C for CK8.  These highlight an isolated micrometastasis in block 1B.  Immunohistochemical stains were also performed on blocks 3B and 3C for D2-40 and CD31.  They show no evidence of lymphovascular invasion.    This case was reviewed intradepartmentally, and the above diagnosis represents a consensus opinion.     • Gross Description 06/09/2025    Final                    Value:1. Axilla, Left.  Received in formalin labeled \"left axillary sentinel node #1\" is a 1.2 x 1.2 x 1.2 cm lymph node with minimal surrounding fat.  The node is sectioned perpendicular to the long axis at 2 mm intervals and submitted entirely in blocks 1A-1B.    2. Axilla, Left.  Received in formalin labeled \"left axillary sentinel node #2\" is a 2.4 x 1.4 x 1.1 cm lymph node.  The excess fat is removed and the node is sectioned perpendicular to the long axis at 2 mm intervals.  The node is submitted " "entirely in blocks 2A-2C.    3. Breast, Left.  Received in formalin labeled \"left breast lumpectomy\" is an 80 g, 8.0 cm medial to lateral, 6.1 cm superior to inferior, 2.9 cm anterior to posterior lumpectomy which is oriented by the surgeon with a short stitch designated superior, a long stitch designated lateral, and a double stitch designated deep.  On the anterior aspect there is a 2.9 x 1.2 cm blue-tinged, grossly unremarkable skin ellipse.  The specimen is inked as follows:                           Anterior-green, inferior-blue, lateral-orange, medial-yellow, posterior-black, superior-red.  Sectioning from medial to lateral into 17 slices reveals 2 Tracie devices located in slices #4 and #12.  Two possible masses are identified between the Tracie devices.  The more medial mass is located in slices #5-#9 (coil clip in slice #7) and measures 2.0 x 1.7 x 1.0 cm.  This mass is 0.6 cm from the anterior margin/skin, 0.6 cm from the deep margin, 1.8 cm from the inferior margin, 1.7 cm from the superior margin, at least 1.7 cm from the medial margin, and at least 3.8 cm from the lateral margin.  The more lateral mass is ill-defined, located in slices #11-#15, and measures 1.9 x 1.0 x 0.6 cm.  This mass is 0.2 cm from the anterior margin, 1.0 cm from the deep margin, 1.5 cm from the inferior margin, 0.9 cm from the superior margin, 0.9 cm from the lateral margin, and at least 4.2 cm from the medial margin.  The masses are approximately 0.4 cm apart.  The remaining fat to fibrous ratio is                           90:10.  Representative sections are submitted as follows:  3A-perpendicular medial margin (slice #1)  3K-8F-ewbopu mass with perpendicular anterior margin, skin, and posterior margin (slices #6 and #7)  3D-nearest perpendicular superior and inferior margins to medial mass (slice #7)  3E-section between masses (slice #10)  3F-3G-lateral mass with perpendicular anterior, posterior, and superior margin (slices #14 " and #15)  3H-nearest perpendicular inferior margin to lateral mass (slice #15)  3I-perpendicular lateral margin (slice #17).  Cold ischemic time is 21 minutes and total time in formalin is greater than 6 and less than 72 hours.  LDP       • Microscopic Description 06/09/2025    Final                    Value:The slides are reviewed and demonstrate histopathologic features supporting the above rendered diagnosis.           Mammo Breast Specimen  Result Date: 6/20/2025  Narrative: SPECIMEN RADIOGRAPH: LEFT BREAST  HISTORY: Tracie  reflectors bracket the site of the two separate biopsy proven invasive and in situ ductal carcinomas in the left 1 o'clock position. There is also a third suspicious mass in the 1 o'clock position that was included in the bracket localization.  TECHNIQUE: Specimen radiography was performed with tomosynthesis.  COMPARISON: 4/16/2025  FINDINGS: Specimen radiography with tomosynthesis demonstrates the presence of all three masses, associated calcifications, the ribbon-shaped marking clip, coil-shaped marking clip, and two Tracie reflectors.  SUMMARY: Successful excision of findings. Results of the specimen radiograph were discussed with Dr. Goncalves intraoperatively.  PATHOLOGY: Invasive ductal carcinoma (Blair grade 2) with associated intermediate grade ductal carcinoma in situ. Invasive carcinoma measures 1.8 cm in greatest dimension. The surgical margins are free of carcinoma. One of two sentinel lymph nodes positive for micrometastasis.    6/20/2025 4:03 PM by Dr. Harika Suarez MD on Workstation: [id number]      NM [place] Node Injection Only  Result Date: 6/9/2025  Narrative: This procedure was auto-finalized with no dictation required.    US Device Placement Breast Without Biopsy 1st  US Device Placement Breast Without Biopsy 1st, Mammo Post Device Placement Left  Result Date: 6/4/2025  Narrative: ULTRASOUND GUIDED TRACIE  PLACEMENT: LEFT BREAST  HISTORY: 65-year-old patient with  "multifocal invasive and in situ ductal carcinoma in the left 1:00 region. The area will be bracketed with two Tracie reflectors.  PROCEDURE: After obtaining informed consent and performing \"time-out\" the left breast was prepped and draped in usual sterile fashion. 5 cc of 1% lidocaine without epinephrine was utilized for local anesthesia at each localization site. Tracie  introducers were placed into the 1:00 position anterior and posterior to the biopsy-proven carcinomas. Appropriate positioning was confirmed with ultrasound imaging and the reflectors were deployed without complication.  Routine left digital mammographic images were obtained. The reflectors are well-positioned approximately 2.9 cm apart. Mammographic images will be available intraoperatively. No complications occurred during this procedure. The signals of both Tracie reflectors were confirmed.  SUMMARY: Successful Tracie  bracket of multifocal invasive and in situ ductal carcinoma in the left 1:00 region. Surgical excision will be performed by Dr. Goncalves on 6/9/2025.  6/4/2025 5:39 PM by Dr. Harika Suarez MD on Workstation: [id number]        Procedures    Assessment / Plan      Assessment/Plan:   Left breast cancer  I reviewed the patient's history, imaging and pathology. We discussed staging, prognosis and general principles of breast cancer therapy.  Historically, postmenopausal lymph node positive breast cancer has been treated with adjuvant chemotherapy in addition to endocrine therapy. More recently, genomic testing has been shown to help to stratify the potential added benefit for an individual patient and identify patients who are less likely to derive significant additional benefit from chemotherapy.  Given N1 disease and age > 50, it would be reasonable to consider omission of chemotherapy if low risk genomic signature. I recommend proceeding with oncotype testing to stratify the potential benefit of adjuvant chemotherapy.  -NCCN " guidelines do not recommend adjuvant Ribociclib for patients with only microscopic fabian disease and less than 2 cm.  -She is being evaluated for adjuvant radiation.    Patient Instructions   We are sending an oncotype test today. Follow up in 3 weeks with results if high risk. You should get a phone call prior to that visit to let you know 1) results are available and 2) whether to keep that appointment     IF low risk (no chemo) 25 or less  Cancel 3 week follow up  Contact radiation oncology to schedule radiation start  RTC 2 weeks after radiation completes to discuss hormone blocking pill     IF high risk (chemo recommended) 26 or higher  Keep follow up with me in 3 weeks to discuss     IF you have not gotten phone call prior to scheduled visit, please call Ila CARRION RN for results.       Follow Up:   With results     Jacquelyn Peter MD  Hematology and Oncology

## 2025-06-26 NOTE — PROGRESS NOTES
Date of Visit: 2025  Name: Joy Thornton  : 1960  MRN: [id number]    Referring Provider: Nick Goncalves MD      REASON FOR CONSULT  Joy Thornton is a 64 y.o. female referred for genetic counseling following her recent breast cancer diagnosis and due to a family history of pancreatic cancer.  In April of this year, Ms. 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  She has a past history of malignant melanoma at the age of 54.  Ms. Thornton elected to pursue genetic testing via [facility] panel with [facility] analyzing 40-cancer risk genes.  Ms. 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  However, this does not explain her breast cancer or melanoma diagnosis or increase her risk for any other cancer.  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  Ms. Thornton was notified of these results via telephone on 2025.  A copy of her family history of cancer and genetic test are attached to this note.     PERTINENT FAMILY HISTORY:  A cancer-focused four-generation pedigree was obtained via patient reporting     Mother - pancreatic cancer, 46  Father - throat cancer, 82  Nephew - unilateral testicular cancer, 2    GENETIC TESTING RESULTS AND RECOMMENDATIONS  Genetic testing was performed by [facility] analyzing 40-cancer-risk genes.    Genetic testing identified a pathogenic mutation in a single copy of the MUTYH (c.1187G>A; p.G396D) gene.  MUTYH associated polyposis (MAP) is an autosomal recessive condition caused by biallelic mutations in the MUTYH gene.  MAP typically presents with the development of 10 to a few hundred colon polyps.  Some reports indicate possible associations with other cancers as well, however, their exact risks are unknown and data is  insufficient to warrant any additional screenings for these malignancies in those affected by MAP.  Individuals found to only have a mutation in a single copy of the MUTYH gene, as is the case with Ms. Thornton, are designated as carriers for MAP, but are not at an increased risk for developing any associated malignancies or other findings.  The general population carrier frequency for MAP is 1-2%.  Carriers of MAP have up to a 25% chance of having a child that may be affected by MAP in their adult life only if their partner is also a carrier of a germline pathogenic MUTYH mutation.  Each of Ms. Thornton's children have a 50% risk of inheriting Ms. Thornton's MUTYH mutation and, therefore, be a carrier for MAP themselves.  For carriers of MAP and whom do not have a family history of colorectal cancer, general population colorectal cancer screening is appropriate.    GENETIC COUNSELING  We reviewed the family history and her personal cancer history information in detail. Cases of cancer follow three general patterns: sporadic, familial, and hereditary.  While most cancer cases are sporadic (~70% of cancer cases), some cases appear to occur in family clusters.  These cases are said to be familial and account for around 20% of cancer cases.  Familial cases may be due to a combination of shared genes and environmental factors among family members that we cannot evaluate via genetic testing at this time.  In 5% - 10% of cancer cases, the risk for cancer is inherited, and the genes responsible for the increased cancer risk are known.       Family histories typical of hereditary cancer syndromes usually include multiple first- and second-degree relatives diagnosed with cancer types that define a syndrome.  These cases tend to be diagnosed at younger-than-expected ages and can be bilateral or multifocal.  The cancer in these families follows an autosomal dominant inheritance pattern, which indicates the likely presence of a  "mutation in a cancer gene.  Children and siblings of an individual carrying a mutation have a 50% chance of inheriting that mutation, thereby inheriting the increased risk to develop cancer.  However, it is not recommended to pursue genetic testing for adult-onset cancers in children as the results would not have clinical utility until some time in adulthood among other ethical reasons.  These mutations can be passed down from the maternal or the paternal lineage.     We discussed that risk factors or \"red flags\" for hereditary risk include cancers diagnosed at earlier-than-average ages, multiple family members diagnosed with cancers associated with mutations in the same gene, or multiple generations of associated cancers. Dependent on the specific cancer type or syndrome, there exists the potential for several clinically significant genes related to the cancer's onset or increased risk for development.  Therefore, the standard approach to hereditary cancer genetic testing at this time is via multi-gene panel analyzing several genes associated with a hereditary risk for cancer.  We reviewed the results in the context of the patient's personal and family history to determine what, if any, post-test cancer risk management changes are recommended.     GENETIC TESTING  The risks, benefits, and limitations of genetic testing and implications for clinical management following testing were reviewed.  We discussed the implications and limitations of a positive, negative, or variant of uncertain significance (VUS) test result.  Genetic test results can influence decisions regarding screening and prevention.  Genetic testing can have significant psychological implications for both individuals and families. Also discussed was the possibility of insurance discrimination based on genetic test results and the laws in place to prevent this, as well as the limitations of these laws.       The Genetic Information Nondiscrimination " Act (MAYO) is a federal law enacted in May 2008.  [place] prohibits discrimination on the basis of genetic information such as genetic test results with respect to health insurance and employment status.  Under Title 1 of MAYO, health insurance companies are prohibited from using an individual's genetic information to change premiums, drop or change an individual's coverage, or prevent coverage from being acquired.  Title 2 of MAYO prohibits employers from using genetic information in employment decisions such as, but not limited to, hiring, firing, promotions, pay, and job assignments.  MAYO protections do not protect against genetic discrimination by life insurance, long-term care or disability insurance,  service members, federal employees, those using the  Health Service, or those employed by a small business with fewer than 15 employees.     FAMILY CONSIDERATIONS  Genetic testing for Ms. Thornton's relatives is recommended.  Her mother's pancreatic cancer diagnosis is suggestive of another cancer-causing mutation possibly existing in the family that Ms. Thornton did not inherit.  Therefore, it is recommended that her relatives have genes associated with pancreatic cancer analyzed as well along with the MUTYH gene.  Each of her children had a 50%, or 1 out of 2, chance of inheriting the MUTYH mutation she carries.  Family members are encouraged to discuss their family history of cancer and appropriate cancer screening recommendations with their healthcare providers.  Family members are also encouraged to inquire about information regarding genetics and genetic testing, if appropriate, at a clinic that offers genetic counseling or their healthcare provider.  We would be happy to see relatives in our clinic for genetic counseling and testing.  They can request a referral from their healthcare provider to [facility] and that will prompt a coordinator to call them for an  appointment.   For family members who live elsewhere, there are genetic counselors at most [facility] centers.  They can find a genetic counselor by visiting the National Society of Genetic Counselors website at www.nsgc.org or they can call our office and we would be happy to give them the contact information of the closest genetic counselor.      SUMMARY  Ms. Thornton's hereditary cancer genetic test identified a pathogenic mutation in a single copy of the MUTYH gene.  However, this does not explain her breast cancer or melanoma diagnosis or increase her risk for any other cancer.  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  It is recommended that Ms. Thornton follow her provider's recommendations for future cancer screening and future risk reduction.        Derian Schuster MS, [facility]  Genetic Counselor  [facility]    Cc:  JoyNick Escobar MD Jackson, Amie, MD Yates, Lindy CALVILLO RN

## 2025-06-26 NOTE — PATIENT INSTRUCTIONS
We are sending an oncotype test today. Follow up in 3 weeks with results if high risk. You should get a phone call prior to that visit to let you know 1) results are available and 2) whether to keep that appointment     IF low risk (no chemo) 25 or less  Cancel 3 week follow up  Contact radiation oncology to schedule radiation start  RTC 2 weeks after radiation completes to discuss hormone blocking pill     IF high risk (chemo recommended) 26 or higher  Keep follow up with me in 3 weeks to discuss     IF you have not gotten phone call prior to scheduled visit, please call Ila CARRION RN for results.

## 2025-06-26 NOTE — PROGRESS NOTES
[facility]ology [facility]  Office number [phone]    Fax number [phone]     New Patient Office Visit      Date: 2025     Patient Name: Joy Thornton  MRN: [id number]  : 1960    Referring Physician: Dr. Nick Goncalves MD    Chief Complaint: Left breast cancer    Cancer Staging:      History of Present Illness: Joy Thornton is a pleasant 65 y.o. female who presents today for evaluation of left breast cancer.    She presented with a palpable mass underwent diagnostic workup.  Diagnostic mammogram 2025 demonstrated a 1.5 cm irregular mass in the anterior upper quadrant of the left breast correlating to her area of palpable concern.  1 cm from the mass is an irregular isodense mass spanning 0.5 cm.  Ultrasound showed the larger dominant mass at 1:00 spanning 1.5 cm and the smaller lateral mass spanning 0.4 cm.  No abnormal axillary lymph nodes.  Yeah maybe  Left breast 1:00 0.5 cm oval mass ultrasound-guided biopsy showed grade 1 invasive ductal carcinoma with associated DCIS (ER 80%, AR 66%, HER2 negative, 0+).  Left breast 1:00 1.5 cm irregular mass 4 cm from the nipple ultrasound-guided biopsy showed grade 2 invasive ductal carcinoma with associated DCIS (ER 90%, AR 90%, HER2 negative, 0+).  Sure  She underwent left breast lumpectomy and sentinel lymph node biopsy on 2025.  Lumpectomy pathology demonstrated grade 2 invasive ductal carcinoma with associated DCIS.  Invasive component spanned 1.8 cm with negative margins.  [place] lymph node positive for micrometastasis spanning 0.6 mm in 1 of 2 sentinel nodes.    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 This is negative for clinically significant symptoms  except as noted below. This document has been scanned into the patient's chart.    Past Medical History:   Past Medical History:   Diagnosis Date    Malignant neoplasm of female breast 2025   Osteoarthritis  No personal history of myocardial infarction, cerebrovascular event, or venous thromboembolism.  Basal cell carcinoma of the face    Past Surgical History:   Past Surgical History:   Procedure Laterality Date     SECTION      x2    CHOLECYSTECTOMY  1998    CLOSED REDUCTION ANKLE FRACTURE  1986    REPLACEMENT TOTAL KNEE  2018       Family History:   Family History   Problem Relation Age of Onset    Pancreatic cancer Mother 45    Esophageal cancer Father 82    No Known Problems Sister     No Known Problems Brother     No Known Problems Maternal Aunt     No Known Problems Paternal Aunt     No Known Problems Maternal Grandmother     No Known Problems Paternal Grandmother     No Known Problems Daughter     No Known Problems Son     Testicular cancer Nephew 2    BRCA / Neg Hx     Breast cancer Neg Hx     Colon cancer Neg Hx     Endometrial cancer Neg Hx        Social History:   Social History     Socioeconomic History    Marital status:    Tobacco Use    Smoking status: Former     Average packs/day: 3.0 packs/day for 45.4 years (136.3 ttl pk-yrs)     Types: Cigarettes     Start date:      Passive exposure: Current    Smokeless tobacco: Never   Vaping Use    Vaping status: Some Days    Substances: THC, CBD   Substance and Sexual Activity    Alcohol use: Not Currently    Drug use: Not Currently     Types: Marijuana     Comment: history of addition to rx pain pills; on methadone now    Sexual activity: Defer     last year.    Medications:     Current Outpatient Medications:     Fluticasone-Umeclidin-Vilant (Trelegy Ellipta) 100-62.5-25 MCG/ACT inhaler, Inhale 1 puff Daily., Disp: 60 each, Rfl: 1    gabapentin (NEURONTIN) 600 MG tablet, Take 1 tablet by mouth 4 (Four) Times a Day.,  "Disp: , Rfl:     methadone (DOLOPHINE) 5 MG/5ML solution, Take 105 mL by mouth Daily., Disp: , Rfl:     naloxone (NARCAN) 4 MG/0.1ML nasal spray, Call 911. Don't prime. [place] in 1 nostril for overdose. Repeat in 2-3 minutes in other nostril if no or minimal breathing/responsiveness., Disp: 2 each, Rfl: 0    nicotine (NICODERM CQ) 21 MG/24HR patch, Place 1 patch on the skin as directed by provider Daily. (Patient not taking: Reported on 6/19/2025), Disp: 30 patch, Rfl: 1    nicotine polacrilex (COMMIT) 2 MG lozenge, Dissolve 1 lozenge in the mouth Every 2 (Two) Hours As Needed for Smoking Cessation. (Patient not taking: Reported on 6/19/2025), Disp: 108 each, Rfl: 1    traMADol (Ultram) 50 MG tablet, Take 1 tablet by mouth Every 6 (Six) Hours As Needed for Moderate Pain or Severe Pain. (Patient not taking: Reported on 6/19/2025), Disp: 15 tablet, Rfl: 0    Allergies:   No Known Allergies    Objective     Vital Signs:   Vitals:    06/26/25 1126   BP: 156/70   Pulse: 65   Temp: 96.9 °F (36.1 °C)   TempSrc: Infrared   SpO2: 92%   Weight: 88.5 kg (195 lb 1.6 oz)   Height: 162.6 cm (64.02\")   PainSc: 0-No pain    Body mass index is 33.47 kg/m².   Pain Score    06/26/25 1126   PainSc: 0-No pain       ECOG Performance Status: 0 - Asymptomatic    Physical Exam:   General: No acute distress. Well appearing   HEENT: Normocephalic, atraumatic. Sclera anicteric.   Neck: supple, no adenopathy.   Cardiovascular: regular rate and rhythm. No murmurs.   Respiratory: Normal rate. Clear to auscultation bilaterally  Abdomen: Soft, nontender, non distended with normoactive bowel sounds  Lymph: no cervical, supraclavicular or axillary adenopathy  Neuro: Alert and oriented x 3. No focal deficits.   Ext: Symmetric, no swelling.   Psych: Euthymic      Laboratory/Imaging Reviewed:   No visits with results within 2 Week(s) from this visit.   Latest known visit with results is:   Lab Requisition on 06/09/2025   Component Date Value Ref Range " Status    Case Report 06/09/2025    Final                    Value:Surgical Pathology Report                         Case: [id number]                                  Authorizing Provider:  Nick Goncalves MD         Collected:           06/09/2025 11:50 AM          [facility]ing [facility]   Received:            06/09/2025 12:19 PM                                 LABORATORY                                                                   Pathologist:           Jerson Arcos MD                                                       Specimens:   1) - Axilla, Left                                                                                   2) - Axilla, Left                                                                                   3) - Breast, Left, short stitch superior; long stitch lateral; double stitch deep          Clinical Information 06/09/2025    Final                    Value:Malignant neoplasm of lower-outer quadrant of left female breast      Final Diagnosis 06/09/2025    Final                    Value:1.  LEFT AXILLARY SENTINEL LYMPH NODE #1, BIOPSY:  One lymph node, positive for micrometastasis (pN1mi) on immunohistochemical stain, see Comment    2.  LEFT AXILLARY SENTINEL LYMPH NODE #2, BIOPSY:  One lymph node, negative for metastatic carcinoma (0/1), see Comment    3.  LEFT BREAST, LUMPECTOMY:  Invasive ductal carcinoma, [place] Grade 2, with associated intermediate-grade ductal carcinoma in situ (DCIS)  Invasive carcinoma measures 1.8 cm in greatest dimension, pT1c  The surgical resection margins are free of carcinoma  Please see CAP staging protocol below for full report          Synoptic Checklist 06/09/2025    Final                    Value:INVASIVE CARCINOMA OF THE BREAST: Resection                            INVASIVE CARCINOMA OF THE BREAST: RESECTION - All Specimens                            8th Edition - Protocol posted: 6/19/2024                                                         SPECIMEN                               Procedure:    Excision (less than total mastectomy)                                Specimen Laterality:    Left                                                         TUMOR                               Tumor Site:    Not specified                                Histologic Type:    Invasive carcinoma of no special type (ductal)                                Histologic Grade (Hudson Histologic Score):                                     Glandular (Acinar) / Tubular Differentiation:    Score 3                                  Nuclear Pleomorphism:    Score 2                                  Mitotic Rate:    Score 1                                  Overall Grade:    Grade 2 (scores of 6 or 7)                                Tumor Size:    Greatest dimension of largest invasive focus (Millimeters): 18 mm                               Ductal Carcinoma In Situ (DCIS):    Present                                  :    Negative for extensive intraductal component (EIC)                                  Architectural Patterns:    Solid                                  Nuclear Grade:    Grade I (low)                                  Necrosis:    Not identified                                Lymphatic and / or Vascular Invasion:    Not identified                                Dermal Lymphatic and / or Vascular Invasion:    Not identified                                Microcalcifications:    Not identified                                Treatment Effect in the Breast:    No known presurgical therapy                                                         MARGINS                               Margin Status for Invasive Carcinoma:    All margins negative for invasive carcinoma                                  Distance from Invasive Carcinoma to Closest Margin:    5 mm                                 Closest Margin(s) to Invasive Carcinoma:    Anterior                                 Margin Status for DCIS:    All margins negative for DCIS                                  Distance from DCIS to Closest Margin:    5 mm                                 Closest Margin(s) to DCIS:    Posterior                                                         REGIONAL LYMPH NODES                               Regional Lymph Node Status:                                     :    Tumor present in regional lymph node(s)                                    Number of Lymph Nodes with Macrometastases:    0                                    Number of Lymph Nodes with Micrometastases:    1                                    Number of Lymph Nodes with Isolated Tumor Cells:    0                                    Size of Largest [place] Metastatic Deposit:    0.6 mm                                   Extranodal Extension:    Not identified                                  Total Number of Lymph Nodes Examined (sentinel and non-sentinel):    2                                  Number of [place] Nodes Examined:    2                                                         pTNM CLASSIFICATION (AJCC 8th Edition)                               Reporting of pT, pN, and (when applicable) pM categories is based on information available to the pathologist at the time the report is issued. As per the AJCC (Chapter 1, 8th Ed.) it is the managing physician's responsibility to establish the final pathologic stage based upon all pertinent information, including but potentially not limited to this pathology report.                               pT Category:    pT1c                                pN Category:    pN1mi                                                         SPECIAL STUDIES                               Estrogen Receptor (ER) Status:    Positive (greater than 10% of cells demonstrate nuclear positivity)                                  Percentage of Cells with Nuclear Positivity:    81-90%            "                     Progesterone Receptor (PgR) Status:    Positive                                  Percentage of Cells with Nuclear Positivity:    81-90%                                HER2 (by immunohistochemistry):    Negative (Score 0)       Comment 06/09/2025    Final                    Value:Immunohistochemical stains, with appropriately reactive controls, were performed on blocks 1A, 1B, 2A, 2B, and 2C for CK8.  These highlight an isolated micrometastasis in block 1B.  Immunohistochemical stains were also performed on blocks 3B and 3C for D2-40 and CD31.  They show no evidence of lymphovascular invasion.    This case was reviewed intradepartmentally, and the above diagnosis represents a consensus opinion.      Gross Description 06/09/2025    Final                    Value:1. Axilla, Left.  Received in formalin labeled \"left axillary sentinel node #1\" is a 1.2 x 1.2 x 1.2 cm lymph node with minimal surrounding fat.  The node is sectioned perpendicular to the long axis at 2 mm intervals and submitted entirely in blocks 1A-1B.    2. Axilla, Left.  Received in formalin labeled \"left axillary sentinel node #2\" is a 2.4 x 1.4 x 1.1 cm lymph node.  The excess fat is removed and the node is sectioned perpendicular to the long axis at 2 mm intervals.  The node is submitted entirely in blocks 2A-2C.    3. Breast, Left.  Received in formalin labeled \"left breast lumpectomy\" is an 80 g, 8.0 cm medial to lateral, 6.1 cm superior to inferior, 2.9 cm anterior to posterior lumpectomy which is oriented by the surgeon with a short stitch designated superior, a long stitch designated lateral, and a double stitch designated deep.  On the anterior aspect there is a 2.9 x 1.2 cm blue-tinged, grossly unremarkable skin ellipse.  The specimen is inked as follows:                           Anterior-green, inferior-blue, lateral-orange, medial-yellow, posterior-black, superior-red.  Sectioning from medial to lateral into 17 slices " reveals 2 Tracie devices located in slices #4 and #12.  Two possible masses are identified between the Tracie devices.  The more medial mass is located in slices #5-#9 (coil clip in slice #7) and measures 2.0 x 1.7 x 1.0 cm.  This mass is 0.6 cm from the anterior margin/skin, 0.6 cm from the deep margin, 1.8 cm from the inferior margin, 1.7 cm from the superior margin, at least 1.7 cm from the medial margin, and at least 3.8 cm from the lateral margin.  The more lateral mass is ill-defined, located in slices #11-#15, and measures 1.9 x 1.0 x 0.6 cm.  This mass is 0.2 cm from the anterior margin, 1.0 cm from the deep margin, 1.5 cm from the inferior margin, 0.9 cm from the superior margin, 0.9 cm from the lateral margin, and at least 4.2 cm from the medial margin.  The masses are approximately 0.4 cm apart.  The remaining fat to fibrous ratio is                           90:10.  Representative sections are submitted as follows:  3A-perpendicular medial margin (slice #1)  1E-6C-gsochx mass with perpendicular anterior margin, skin, and posterior margin (slices #6 and #7)  3D-nearest perpendicular superior and inferior margins to medial mass (slice #7)  3E-section between masses (slice #10)  3F-3G-lateral mass with perpendicular anterior, posterior, and superior margin (slices #14 and #15)  3H-nearest perpendicular inferior margin to lateral mass (slice #15)  3I-perpendicular lateral margin (slice #17).  Cold ischemic time is 21 minutes and total time in formalin is greater than 6 and less than 72 hours.  LDP        Microscopic Description 06/09/2025    Final                    Value:The slides are reviewed and demonstrate histopathologic features supporting the above rendered diagnosis.           Mammo Breast Specimen  Result Date: 6/20/2025  Narrative: SPECIMEN RADIOGRAPH: LEFT BREAST  HISTORY: Tracie  reflectors bracket the site of the two separate biopsy proven invasive and in situ ductal carcinomas in the left 1  "o'clock position. There is also a third suspicious mass in the 1 o'clock position that was included in the bracket localization.  TECHNIQUE: Specimen radiography was performed with tomosynthesis.  COMPARISON: 4/16/2025  FINDINGS: Specimen radiography with tomosynthesis demonstrates the presence of all three masses, associated calcifications, the ribbon-shaped marking clip, coil-shaped marking clip, and two Tracie reflectors.  SUMMARY: Successful excision of findings. Results of the specimen radiograph were discussed with Dr. Goncalves intraoperatively.  PATHOLOGY: Invasive ductal carcinoma ([place] grade 2) with associated intermediate grade ductal carcinoma in situ. Invasive carcinoma measures 1.8 cm in greatest dimension. The surgical margins are free of carcinoma. One of two sentinel lymph nodes positive for micrometastasis.    6/20/2025 4:03 PM by Dr. Harika Suarez MD on Workstation: [id number]      NM [place] Node Injection Only  Result Date: 6/9/2025  Narrative: This procedure was auto-finalized with no dictation required.    US Device Placement Breast Without Biopsy 1st  US Device Placement Breast Without Biopsy 1st, Mammo Post Device Placement Left  Result Date: 6/4/2025  Narrative: ULTRASOUND GUIDED TRACIE  PLACEMENT: LEFT BREAST  HISTORY: 65-year-old patient with multifocal invasive and in situ ductal carcinoma in the left 1:00 region. The area will be bracketed with two Tracie reflectors.  PROCEDURE: After obtaining informed consent and performing \"time-out\" the left breast was prepped and draped in usual sterile fashion. 5 cc of 1% lidocaine without epinephrine was utilized for local anesthesia at each localization site. Tracie  introducers were placed into the 1:00 position anterior and posterior to the biopsy-proven carcinomas. Appropriate positioning was confirmed with ultrasound imaging and the reflectors were deployed without complication.  Routine left digital mammographic images were obtained. " The reflectors are well-positioned approximately 2.9 cm apart. Mammographic images will be available intraoperatively. No complications occurred during this procedure. The signals of both Tracie reflectors were confirmed.  SUMMARY: Successful Tracie  bracket of multifocal invasive and in situ ductal carcinoma in the left 1:00 region. Surgical excision will be performed by Dr. Goncalves on 6/9/2025.  6/4/2025 5:39 PM by Dr. Harika Suarez MD on Workstation: [employer]        Procedures    Assessment / Plan      Assessment/Plan:   Left breast cancer  I reviewed the patient's history, imaging and pathology. We discussed staging, prognosis and general principles of breast cancer therapy.  Historically, postmenopausal lymph node positive breast cancer has been treated with adjuvant chemotherapy in addition to endocrine therapy. More recently, genomic testing has been shown to help to stratify the potential added benefit for an individual patient and identify patients who are less likely to derive significant additional benefit from chemotherapy.  Given N1 disease and age > 50, it would be reasonable to consider omission of chemotherapy if low risk genomic signature. I recommend proceeding with oncotype testing to stratify the potential benefit of adjuvant chemotherapy.  -NCCN guidelines do not recommend adjuvant Ribociclib for patients with only microscopic fabian disease and less than 2 cm.  -She is being evaluated for adjuvant radiation.    Patient Instructions   We are sending an oncotype test today. Follow up in 3 weeks with results if high risk. You should get a phone call prior to that visit to let you know 1) results are available and 2) whether to keep that appointment     IF low risk (no chemo) 25 or less  Cancel 3 week follow up  Contact radiation oncology to schedule radiation start  RTC 2 weeks after radiation completes to discuss hormone blocking pill     IF high risk (chemo recommended) 26 or higher  Keep follow  up with me in 3 weeks to discuss     IF you have not gotten phone call prior to scheduled visit, please call Ila CARRION RN for results.       Follow Up:   With results     Jacquelyn Peter MD  Hematology and Oncology

## 2025-06-27 ENCOUNTER — APPOINTMENT (OUTPATIENT)
Age: 65
End: 2025-06-27
Payer: MEDICARE

## 2025-06-27 ENCOUNTER — PATIENT ROUNDING (BHMG ONLY) (OUTPATIENT)
Dept: ONCOLOGY | Facility: CLINIC | Age: 65
End: 2025-06-27
Payer: MEDICARE

## 2025-06-27 NOTE — PROGRESS NOTES
A My-Chart message has been [place] to the patient [place] [facility] [place]

## 2025-07-03 ENCOUNTER — TELEPHONE (OUTPATIENT)
Age: 65
End: 2025-07-03
Payer: MEDICARE

## 2025-07-03 NOTE — TELEPHONE ENCOUNTER
Left voicemail for patient to reschedule appt with Dr. Borrero after visit with Dr. Peter. She currently has Oncotype pending. Also sent [facility] message.

## 2025-07-10 LAB
CYTO UR: NORMAL
LAB AP CASE REPORT: NORMAL
LAB AP CLINICAL INFORMATION: NORMAL
LAB AP DIAGNOSIS COMMENT: NORMAL
LAB AP GENOMIC HEALTH, ADDENDUM: NORMAL
LAB AP SYNOPTIC CHECKLIST: NORMAL
PATH REPORT.FINAL DX SPEC: NORMAL
PATH REPORT.GROSS SPEC: NORMAL

## 2025-07-16 ENCOUNTER — OFFICE VISIT (OUTPATIENT)
Dept: ONCOLOGY | Facility: CLINIC | Age: 65
End: 2025-07-16
Payer: MEDICARE

## 2025-07-16 VITALS
BODY MASS INDEX: 33.65 KG/M2 | SYSTOLIC BLOOD PRESSURE: 124 MMHG | DIASTOLIC BLOOD PRESSURE: 71 MMHG | OXYGEN SATURATION: 96 % | HEIGHT: 64 IN | WEIGHT: 197.12 LBS | HEART RATE: 57 BPM | RESPIRATION RATE: 16 BRPM | TEMPERATURE: 97.7 F

## 2025-07-16 DIAGNOSIS — Z17.0 MALIGNANT NEOPLASM OF LEFT BREAST IN FEMALE, ESTROGEN RECEPTOR POSITIVE, UNSPECIFIED SITE OF BREAST: Primary | ICD-10-CM

## 2025-07-16 DIAGNOSIS — C50.912 MALIGNANT NEOPLASM OF LEFT BREAST IN FEMALE, ESTROGEN RECEPTOR POSITIVE, UNSPECIFIED SITE OF BREAST: Primary | ICD-10-CM

## 2025-07-16 RX ORDER — TRAMADOL HYDROCHLORIDE 50 MG/1
50-100 TABLET ORAL EVERY 6 HOURS PRN
Qty: 10 TABLET | Refills: 0 | Status: SHIPPED | OUTPATIENT
Start: 2025-07-16

## 2025-07-16 NOTE — PROGRESS NOTES
Hematology and Oncology [place]  Office number [phone]    Fax number [phone]     Follow up     Date: 25      Patient Name: Joy Thornton  MRN: [id number]  : 1960    Referring Physician: Dr. Nick Goncalves MD    Chief Complaint: Left breast cancer    History of Present Illness: Joy Thornton is a pleasant 65 y.o. female who presents today for evaluation of left breast cancer.    She presented with a palpable mass underwent diagnostic workup.  Diagnostic mammogram 2025 demonstrated a 1.5 cm irregular mass in the anterior upper quadrant of the left breast correlating to her area of palpable concern.  1 cm from the mass is an irregular isodense mass spanning 0.5 cm.  Ultrasound showed the larger dominant mass at 1:00 spanning 1.5 cm and the smaller lateral mass spanning 0.4 cm.  No abnormal axillary lymph nodes.  Juan Luis everett  Left breast 1:00 0.5 cm oval mass ultrasound-guided biopsy showed grade 1 invasive ductal carcinoma with associated DCIS (ER 80%, NV 66%, HER2 negative, 0+).  Left breast 1:00 1.5 cm irregular mass 4 cm from the nipple ultrasound-guided biopsy showed grade 2 invasive ductal carcinoma with associated DCIS (ER 90%, NV 90%, HER2 negative, 0+).    She underwent left breast lumpectomy and sentinel lymph node biopsy on 2025.  Lumpectomy pathology demonstrated grade 2 invasive ductal carcinoma with associated DCIS.  Invasive component spanned 1.8 cm with negative margins.  [place] lymph node positive for micrometastasis spanning 0.6 mm in 1 of 2 sentinel nodes. Oncotype 29.    Treatment history:  TC x 4, planning 25    Interval history:  Returns for discussion of adjuvant treatment recommendations following Oncotype 29. Quit smoking on day of surgery. Weight gain 17 lb since then. Body mass index is 33.82 kg/m².  Left shoulder pain since surgery, not worsening, feels this is mild. Has baseline joint pain.   Baseline right > left swelling.  Right ankle  pain, has h/o frequent falls, walks with cane, remote ankle injury 40 years ago. Has had ankles surgeries in past and was planning a fusion but that had been delayed.     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  Basal cell carcinoma of the face  H/o prior left chest wall port for antibiotics.     Past Surgical History:   Past Surgical History:   Procedure Laterality Date     SECTION      x2    CHOLECYSTECTOMY  1998    CLOSED REDUCTION ANKLE FRACTURE  1986    REPLACEMENT TOTAL KNEE  2018       Family History:   Family History   Problem Relation Age of Onset    Pancreatic cancer Mother 45    Esophageal cancer Father 82    No Known Problems Sister     No Known Problems Brother     No Known Problems Maternal Aunt     No Known Problems Paternal Aunt     No Known Problems Maternal Grandmother     No Known Problems Paternal Grandmother     No Known Problems Daughter     No Known Problems Son     Testicular cancer Nephew 2    BRCA 1/2 Neg Hx     Breast cancer Neg Hx     Colon cancer Neg Hx     Endometrial cancer Neg Hx        Social History:   Social History     Socioeconomic History    Marital status:    Tobacco Use    Smoking status: Former     Average packs/day: 3.0 packs/day for 45.4 years (136.3 ttl pk-yrs)     Types: Cigarettes     Start date:      Passive exposure: Current    Smokeless tobacco: Never   Vaping Use    Vaping status: Some Days    Substances: THC, CBD   Substance and Sexual Activity    Alcohol use: Not Currently    Drug use: Not Currently     Types: Marijuana     Comment: history of addition to rx pain pills;  "on methadone now    Sexual activity: Defer     last year.    Medications:     Current Outpatient Medications:     Fluticasone-Umeclidin-Vilant (Trelegy Ellipta) 100-62.5-25 MCG/ACT inhaler, Inhale 1 puff Daily., Disp: 60 each, Rfl: 1    gabapentin (NEURONTIN) 600 MG tablet, Take 1 tablet by mouth 4 (Four) Times a Day., Disp: , Rfl:     methadone (DOLOPHINE) 5 MG/5ML solution, Take 105 mL by mouth Daily., Disp: , Rfl:     Allergies:   Allergies   Allergen Reactions    Diclofenac-Misoprostol Irritability       Objective     Vital Signs:   Vitals:    25 1126   BP: 124/71   Pulse: 57   Resp: 16   Temp: 97.7 °F (36.5 °C)   TempSrc: Temporal   SpO2: 96%   Weight: 89.4 kg (197 lb 1.9 oz)   Height: 162.6 cm (64.02\")   PainSc: 4     Body mass index is 33.82 kg/m².   Pain Score    25 1126   PainSc: 4        ECOG Performance Status: 0 - Asymptomatic    Physical Exam:   General: No acute distress. Well appearing   HEENT: Normocephalic, atraumatic. Sclera anicteric.   Neck: supple, no adenopathy.   Cardiovascular: regular rate and rhythm. No murmurs.   Respiratory: Normal rate. Clear to auscultation bilaterally  Abdomen: Soft, nontender, non distended with normoactive bowel sounds  Lymph: no cervical, supraclavicular or axillary adenopathy  Neuro: Alert and oriented x 3. No focal deficits.   Ext: Symmetric, no swelling.     Laboratory/Imaging Reviewed:   No visits with results within 2 Week(s) from this visit.   Latest known visit with results is:   Lab Requisition on 2025   Component Date Value Ref Range Status    Case Report 2025    Final                    Value:Surgical Pathology Report                         Case: [id number]                                  Authorizing Provider:  Nick Goncalves MD         Collected:           2025 11:50 AM          Ordering Location:     [facility]   Received:            2025 12:19 PM                                 LABORATORY   "                                                                 Pathologist:           Jerson Arcos MD                                                       Specimens:   1) - Axilla, Left                                                                                   2) - Axilla, Left                                                                                   3) - Breast, Left, short stitch superior; long stitch lateral; double stitch deep          [employer], Addendum 06/09/2025    Final                    Value:OncotypeDX Breast Recurrence Score  Recurrence Score Result = 29  Testing performed at outside laboratory. See scanned report.        Clinical Information 06/09/2025    Final                    Value:Malignant neoplasm of lower-outer quadrant of left female breast      Final Diagnosis 06/09/2025    Final                    Value:1.  LEFT AXILLARY SENTINEL LYMPH NODE #1, BIOPSY:  One lymph node, positive for micrometastasis (pN1mi) on immunohistochemical stain, see Comment    2.  LEFT AXILLARY SENTINEL LYMPH NODE #2, BIOPSY:  One lymph node, negative for metastatic carcinoma (0/1), see Comment    3.  LEFT BREAST, LUMPECTOMY:  Invasive ductal carcinoma, [place] Grade 2, with associated intermediate-grade ductal carcinoma in situ (DCIS)  Invasive carcinoma measures 1.8 cm in greatest dimension, pT1c  The surgical resection margins are free of carcinoma  Please see CAP staging protocol below for full report          Synoptic Checklist 06/09/2025    Final                    Value:INVASIVE CARCINOMA OF THE BREAST: Resection                            INVASIVE CARCINOMA OF THE BREAST: RESECTION - All Specimens                            8th Edition - Protocol posted: 6/19/2024                                                        SPECIMEN                               Procedure:    Excision (less than total mastectomy)                                Specimen Laterality:    Left                                                          TUMOR                               Tumor Site:    Not specified                                Histologic Type:    Invasive carcinoma of no special type (ductal)                                Histologic Grade (Flor[place] Histologic Score):                                     Glandular (Acinar) / Tubular Differentiation:    Score 3                                  Nuclear Pleomorphism:    Score 2                                  Mitotic Rate:    Score 1                                  Overall Grade:    Grade 2 (scores of 6 or 7)                                Tumor Size:    Greatest dimension of largest invasive focus (Millimeters): 18 mm                               Ductal Carcinoma In Situ (DCIS):    Present                                  :    Negative for extensive intraductal component (EIC)                                  Architectural Patterns:    Solid                                  Nuclear Grade:    Grade I (low)                                  Necrosis:    Not identified                                Lymphatic and / or Vascular Invasion:    Not identified                                Dermal Lymphatic and / or Vascular Invasion:    Not identified                                Microcalcifications:    Not identified                                Treatment Effect in the Breast:    No known presurgical therapy                                                         MARGINS                               Margin Status for Invasive Carcinoma:    All margins negative for invasive carcinoma                                  Distance from Invasive Carcinoma to Closest Margin:    5 mm                                 Closest Margin(s) to Invasive Carcinoma:    Anterior                                Margin Status for DCIS:    All margins negative for DCIS                                  Distance from DCIS to Closest Margin:    5 mm                                  Closest Margin(s) to DCIS:    Posterior                                                         REGIONAL LYMPH NODES                               Regional Lymph Node Status:                                     :    Tumor present in regional lymph node(s)                                    Number of Lymph Nodes with Macrometastases:    0                                    Number of Lymph Nodes with Micrometastases:    1                                    Number of Lymph Nodes with Isolated Tumor Cells:    0                                    Size of Largest [place] Metastatic Deposit:    0.6 mm                                   Extranodal Extension:    Not identified                                  Total Number of Lymph Nodes Examined (sentinel and non-sentinel):    2                                  Number of [place] Nodes Examined:    2                                                         pTNM CLASSIFICATION (AJCC 8th Edition)                               Reporting of pT, pN, and (when applicable) pM categories is based on information available to the pathologist at the time the report is issued. As per the AJCC (Chapter 1, 8th Ed.) it is the managing physician's responsibility to establish the final pathologic stage based upon all pertinent information, including but potentially not limited to this pathology report.                               pT Category:    pT1c                                pN Category:    pN1mi                                                         SPECIAL STUDIES                               Estrogen Receptor (ER) Status:    Positive (greater than 10% of cells demonstrate nuclear positivity)                                  Percentage of Cells with Nuclear Positivity:    81-90%                                Progesterone Receptor (PgR) Status:    Positive                                  Percentage of Cells with Nuclear Positivity:    81-90%                                HER2 (by  "immunohistochemistry):    Negative (Score 0)       Comment 06/09/2025    Final                    Value:Immunohistochemical stains, with appropriately reactive controls, were performed on blocks 1A, 1B, 2A, 2B, and 2C for CK8.  These highlight an isolated micrometastasis in block 1B.  Immunohistochemical stains were also performed on blocks 3B and 3C for D2-40 and CD31.  They show no evidence of lymphovascular invasion.    This case was reviewed intradepartmentally, and the above diagnosis represents a consensus opinion.      Gross Description 06/09/2025    Final                    Value:1. Axilla, Left.  Received in formalin labeled \"left axillary sentinel node #1\" is a 1.2 x 1.2 x 1.2 cm lymph node with minimal surrounding fat.  The node is sectioned perpendicular to the long axis at 2 mm intervals and submitted entirely in blocks 1A-1B.    2. Axilla, Left.  Received in formalin labeled \"left axillary sentinel node #2\" is a 2.4 x 1.4 x 1.1 cm lymph node.  The excess fat is removed and the node is sectioned perpendicular to the long axis at 2 mm intervals.  The node is submitted entirely in blocks 2A-2C.    3. Breast, Left.  Received in formalin labeled \"left breast lumpectomy\" is an 80 g, 8.0 cm medial to lateral, 6.1 cm superior to inferior, 2.9 cm anterior to posterior lumpectomy which is oriented by the surgeon with a short stitch designated superior, a long stitch designated lateral, and a double stitch designated deep.  On the anterior aspect there is a 2.9 x 1.2 cm blue-tinged, grossly unremarkable skin ellipse.  The specimen is inked as follows:                           Anterior-green, inferior-blue, lateral-orange, medial-yellow, posterior-black, superior-red.  Sectioning from medial to lateral into 17 slices reveals 2 Tracie devices located in slices #4 and #12.  Two possible masses are identified between the Tracie devices.  The more medial mass is located in slices #5-#9 (coil clip in slice #7) and " measures 2.0 x 1.7 x 1.0 cm.  This mass is 0.6 cm from the anterior margin/skin, 0.6 cm from the deep margin, 1.8 cm from the inferior margin, 1.7 cm from the superior margin, at least 1.7 cm from the medial margin, and at least 3.8 cm from the lateral margin.  The more lateral mass is ill-defined, located in slices #11-#15, and measures 1.9 x 1.0 x 0.6 cm.  This mass is 0.2 cm from the anterior margin, 1.0 cm from the deep margin, 1.5 cm from the inferior margin, 0.9 cm from the superior margin, 0.9 cm from the lateral margin, and at least 4.2 cm from the medial margin.  The masses are approximately 0.4 cm apart.  The remaining fat to fibrous ratio is                           90:10.  Representative sections are submitted as follows:  3A-perpendicular medial margin (slice #1)  9O-5R-djwyiw mass with perpendicular anterior margin, skin, and posterior margin (slices #6 and #7)  3D-nearest perpendicular superior and inferior margins to medial mass (slice #7)  3E-section between masses (slice #10)  3F-3G-lateral mass with perpendicular anterior, posterior, and superior margin (slices #14 and #15)  3H-nearest perpendicular inferior margin to lateral mass (slice #15)  3I-perpendicular lateral margin (slice #17).  Cold ischemic time is 21 minutes and total time in formalin is greater than 6 and less than 72 hours.  LDP        Microscopic Description 06/09/2025    Final                    Value:The slides are reviewed and demonstrate histopathologic features supporting the above rendered diagnosis.         No results found.    Procedures    Assessment / Plan      Assessment/Plan:     Left breast cancer  -Historically, postmenopausal lymph node positive breast cancer has been treated with adjuvant chemotherapy in addition to endocrine therapy. More recently, genomic testing has been shown to help to stratify the potential added benefit for an individual patient and identify patients who are less likely to derive significant  additional benefit from chemotherapy.  Given N1 disease and age > 50, it would be reasonable to consider omission of chemotherapy if low risk genomic signature. I recommend proceeding with oncotype testing to stratify the potential benefit of adjuvant chemotherapy.  -Oncotype testing 29.  I do recommend adjuvant chemotherapy. We discussed standard treatment options of 1) dose dense Adriamycin and Cytoxan followed by weekly Taxol or 2) Taxotere and Cytoxan. We discussed differences in schedule and toxicities. We specifically discussed an increased risk for cardiotoxicity and late bone marrow disorders with the addition of adriamycin.  -I recommend TC x 4 cycles.  -NCCN guidelines do not recommend adjuvant Ribociclib for patients with only microscopic fabian disease and less than 2 cm.  -She is being evaluated for adjuvant radiation.    2. Alopecia risk  -We discussed use of the [place] cooling device.  We discussed success rates of 60-70% grade 1 alopecia for taxane based chemotherapy. She does understand that there is still potential for more significant alopecia.  She wants to proceed with use.     3.  Baseline neuropathy  - Mild secondary to remote right ankle injury  - Cryotherapy for peripheral neuropathy prevention    4. Access  -Desires port    Follow Up:   With treatment start     Jacquelyn Peter MD  Hematology and Oncology     Time spent on the day of service was 40 min inclusive of time before, during, and after office visit on record review, medically appropriate history and physical, counseling patient, ordering tests, documenting in the medical record, and communicating with referring provider.    steady

## 2025-07-17 ENCOUNTER — TELEPHONE (OUTPATIENT)
Dept: ONCOLOGY | Facility: CLINIC | Age: 65
End: 2025-07-17
Payer: MEDICARE

## 2025-07-17 NOTE — TELEPHONE ENCOUNTER
Called patient to advise per Dr. Peter that she did not evaluate her for a BM disorder and that her blood counts are normal so no suspicion of a bone marrow problem but if she has had abnormal BM bx in the past, this office should obtain reports, etc regarding it before she begins treatment.  No answer received.  Left  requesting return call to discuss.   The patient is a 68y Female complaining of

## 2025-07-17 NOTE — TELEPHONE ENCOUNTER
Returned call to patient at 09:41 today.  She stated she wanted RN to ask MD if there is any other cancer that she has other than breast cancer, as she has had a BM bx before.  Advised RN will ask MD and return call to her.  Patient verbalized understanding.

## 2025-07-17 NOTE — TELEPHONE ENCOUNTER
Caller: Joy Thornton    Relationship: Self    Best call back number: [phone]      What was the call regarding: PATIENT WANTED TO SPEAK TO THE NURSE REGARDING HER DIAGNOSIS

## 2025-07-18 ENCOUNTER — TELEPHONE (OUTPATIENT)
Dept: ONCOLOGY | Facility: CLINIC | Age: 65
End: 2025-07-18
Payer: MEDICARE

## 2025-07-18 ENCOUNTER — TELEPHONE (OUTPATIENT)
Age: 65
End: 2025-07-18
Payer: MEDICARE

## 2025-07-18 NOTE — TELEPHONE ENCOUNTER
Patient stated she had a BM Bx at [facility] in about 2008 because her labs were abnormal but the BM Bx was normal.  Advised patient RN will notify MD when she returns to clinic on 7/21/25 and let return her call with MD advise.  Patient verbalized understanding.

## 2025-07-18 NOTE — TELEPHONE ENCOUNTER
Noted patient's Oncotype results = 29. Plan per Dr. Peter's note for TC x 4 cycles. Notified Dr. Borrero. He would still like to see patient for follow up/ReEval to discuss plan for radiation. Patient scheduled for 7/24/25 @ 2:30 at [facility] [place].

## 2025-07-21 DIAGNOSIS — Z17.0 MALIGNANT NEOPLASM OF UPPER-OUTER QUADRANT OF LEFT BREAST IN FEMALE, ESTROGEN RECEPTOR POSITIVE: Primary | ICD-10-CM

## 2025-07-21 DIAGNOSIS — C50.412 MALIGNANT NEOPLASM OF UPPER-OUTER QUADRANT OF LEFT BREAST IN FEMALE, ESTROGEN RECEPTOR POSITIVE: Primary | ICD-10-CM

## 2025-07-21 RX ORDER — ONDANSETRON 8 MG/1
8 TABLET, FILM COATED ORAL 3 TIMES DAILY PRN
Qty: 30 TABLET | Refills: 5 | Status: SHIPPED | OUTPATIENT
Start: 2025-07-21

## 2025-07-21 RX ORDER — DEXAMETHASONE 4 MG/1
TABLET ORAL
Qty: 12 TABLET | Refills: 3 | Status: SHIPPED | OUTPATIENT
Start: 2025-07-21

## 2025-07-24 ENCOUNTER — OFFICE VISIT (OUTPATIENT)
Dept: RADIATION ONCOLOGY | Facility: HOSPITAL | Age: 65
End: 2025-07-24
Payer: MEDICARE

## 2025-07-24 ENCOUNTER — HOSPITAL ENCOUNTER (OUTPATIENT)
Dept: RADIATION ONCOLOGY | Facility: HOSPITAL | Age: 65
Setting detail: RADIATION/ONCOLOGY SERIES
Discharge: HOME | End: 2025-07-24
Payer: MEDICARE

## 2025-07-24 VITALS
DIASTOLIC BLOOD PRESSURE: 76 MMHG | RESPIRATION RATE: 16 BRPM | HEART RATE: 76 BPM | SYSTOLIC BLOOD PRESSURE: 159 MMHG | BODY MASS INDEX: 32.94 KG/M2 | WEIGHT: 192 LBS | TEMPERATURE: 98.5 F | OXYGEN SATURATION: 97 %

## 2025-07-24 DIAGNOSIS — C50.412 MALIGNANT NEOPLASM OF UPPER-OUTER QUADRANT OF LEFT BREAST IN FEMALE, ESTROGEN RECEPTOR POSITIVE: Primary | ICD-10-CM

## 2025-07-24 DIAGNOSIS — Z17.0 MALIGNANT NEOPLASM OF UPPER-OUTER QUADRANT OF LEFT BREAST IN FEMALE, ESTROGEN RECEPTOR POSITIVE: Primary | ICD-10-CM

## 2025-07-24 NOTE — PROGRESS NOTES
CONSULTATION NOTE    NAME:      Joy Thornton  :                                                          1960  DATE OF CONSULTATION:                       25  REQUESTING PHYSICIAN:                   Nick Goncalves MD  REASON FOR CONSULTATION:           Further evaluation management of the patient's invasive ductal carcinoma of the breast for consideration of adjuvant radiation treatment at the request of Dr. Goncalves.           BRIEF HISTORY:  Joy Thornton  is a very pleasant 65 y.o. female who initially presented with an abnormal screening mammogram.  The patient had a mammogram in 2025 that showed a left upper outer quadrant 1.5 cm mass with another satellite mass.  This was confirmed on ultrasound.  The patient then had a left breast biopsy in 2025.  The patient had a lesion at 1:00 4 cm from the nipple that was consistent with invasive ductal carcinoma low-grade.  The patient also had another 0.5 cm lesion seen on ultrasound that was biopsy was consistent with ductal carcinoma.  The patient had an MRI on 2025 that showed 2 areas of concern in the left breast.  The patient did not have any additional lesions in the breast.  There were no concerning lymph nodes identified on that exam.  Patient was then taken for a left breast lumpectomy with Dr. Goncalves on 2025.  The patient was found to have a [id number] (MI) invasive ductal carcinoma ER/[place] positive HER2 negative grade 2 malignancy.  The patient had widely negative margins.  On x-ray evaluation of the patient's specimen 2 clips were identified.      The patient was evaluated for radiation and chemotherapy.  The patient had an Oncotype DX score that was sent.  She nepafenac score of 29 and had chemotherapy indicators a component of her care.  The patient has plans to start that with Dr. Peter.  The patient has her chemo teach coming up.  Patient reports that she has no additional questions regarding her radiation for  the time being.      BMI:  Body mass index is 32.94 kg/m².      Social History     Substance and Sexual Activity   Alcohol Use Not Currently       Allergies   Allergen Reactions    Diclofenac-Misoprostol Irritability       Social History     Tobacco Use    Smoking status: Former     Average packs/day: 3.0 packs/day for 45.4 years (136.3 ttl pk-yrs)     Types: Cigarettes     Start date:      Passive exposure: Current    Smokeless tobacco: Never   Vaping Use    Vaping status: Some Days    Substances: THC, CBD   Substance Use Topics    Alcohol use: Not Currently    Drug use: Not Currently     Types: Marijuana     Comment: history of addition to rx pain pills; on methadone now         Past Medical History:   Diagnosis Date    Malignant neoplasm of female breast 2025       family history includes Esophageal cancer (age of onset: 82) in her father; No Known Problems in her brother, daughter, maternal aunt, maternal grandmother, paternal aunt, paternal grandmother, sister, and son; Pancreatic cancer (age of onset: 45) in her mother; Testicular cancer (age of onset: 2) in her nephew.     Past Surgical History:   Procedure Laterality Date     SECTION      x2    CHOLECYSTECTOMY  1998    CLOSED REDUCTION ANKLE FRACTURE  1986    REPLACEMENT TOTAL KNEE  2018        Review of Systems - Oncology      A full 14 point review of systems was performed and was negative except as noted in the HPI.    Objective   VITAL SIGNS:   Vitals:    25 1407   BP: 159/76   Pulse: 76   Resp: 16   Temp: 98.5 °F (36.9 °C)   TempSrc: Temporal   SpO2: 97%   Weight: 87.1 kg (192 lb)   PainSc: 0-No pain        KPS       80%    Physical Exam  Vitals and nursing note reviewed.   Constitutional:       Appearance: She is well-developed.   HENT:      Head: Normocephalic and atraumatic.   Eyes:      Conjunctiva/sclera: Conjunctivae normal.      Pupils: Pupils are equal, round, and reactive to light.   Neck:      Comments: No obviously  enlarged cervical or supraclavicular LAD.  Cardiovascular:      Comments: Patient well perfused. Non cyanotic. No prominent JVD. No pedal edema  Pulmonary:      Effort: Pulmonary effort is normal.      Breath sounds: Normal breath sounds. No stridor. No wheezing.   Abdominal:      General: There is no distension.      Palpations: Abdomen is soft.   Musculoskeletal:         General: Normal range of motion.      Cervical back: Normal range of motion and neck supple.      Comments: Patient moves all extremities spontaneously.   No arm edema.  Range of motion intact.   Skin:     General: Skin is warm and dry.      Comments: Breast exam deferred today given how recent her surgery was.   Neurological:      Mental Status: She is alert and oriented to person, place, and time.      Comments: Coordination intact.   Psychiatric:         Behavior: Behavior normal.         Thought Content: Thought content normal.         Judgment: Judgment normal.              The following portions of the patient's history were reviewed and updated as appropriate: allergies, current medications, past family history, past medical history, past social history, past surgical history, and problem list.    Oncotype DX score: 29    MRI Breast Bilateral Diagnostic W WO Contrast  Result Date: 5/19/2025  BI-RADS 6 known biopsy-proven malignancy left breast. Mammographic and tomographic findings corresponding to findings on MRI without MRI evidence of other sites of disease in either the right or left breast. If breast conservation is to be considered bracket localization is recommended.  RECOMMENDATIONS: Surgical and oncology follow-up  BI-RADS CATEGORY: 6 known biopsy-proven malignancy left breast    5/19/2025 12:07 PM by Dr. Sara Linton MD on Workstation: [id number]      US Guided Breast Biopsy With & Without Device Each Additional  Addendum Date: 5/6/2025  ADDENDUM: The patient was admitted to the ICU before she could be informed of her  pathology results. Her referring provider's office was contacted by Rachel Medina (breast care nurse) on 5/6/2025 with the final results and recommendations.  5/6/2025 5:09 PM by Dr. Harika Suarez MD on Workstation: [id number]      Result Date: 5/6/2025  Pathology results are concordant with imaging.  RECOMMENDATION: Surgical consultation and preoperative breast MRI imaging  The patient will be called with final biopsy results and recommendations by our breast care nurse.    5/5/2025 1:33 PM by Dr. Harika Suarez MD on Workstation: [id number]      US Guided Breast Biopsy With & Without Device initial  Addendum Date: 5/6/2025  ADDENDUM: The patient was admitted to the ICU before she could be informed of her pathology results. Her referring provider's office was contacted by Rachel Medina (breast care nurse) on 5/6/2025 with the final results and recommendations.  5/6/2025 5:09 PM by Dr. Harika Suarez MD on Workstation: [id number]      Result Date: 5/6/2025  Pathology results are concordant with imaging.  RECOMMENDATION: Surgical consultation and preoperative breast MRI imaging  The patient will be called with final biopsy results and recommendations by our breast care nurse.    5/5/2025 1:33 PM by Dr. Hairka Suarez MD on Workstation: [id number]      Mammo Post Device Placement Left  Addendum Date: 5/6/2025  ADDENDUM: The patient was admitted to the ICU before she could be informed of her pathology results. Her referring provider's office was contacted by Rachel Medina (breast care nurse) on 5/6/2025 with the final results and recommendations.  5/6/2025 5:09 PM by Dr. Harika Suarez MD on Workstation: [id number]      Result Date: 5/6/2025  Pathology results are concordant with imaging.  RECOMMENDATION: Surgical consultation and preoperative breast MRI imaging  The patient will be called with final biopsy results and recommendations by our breast care nurse.    5/5/2025 1:33 PM by Dr. Harika Suarez MD on Workstation:  [id number]      CT Angiogram Chest  Result Date: 5/6/2025  Impression: No evidence of pulmonary embolism. New/increased in conspicuity compared to same day exam is bronchial wall thickening with scattered mucoid impaction within the bilateral lower lobes, with left basilar micronodularity as well as a small peripheral consolidation in the lingula. Findings suspected to related to aspiration with subsequent endobronchial and downstream infection/inflammation. Electronically Signed: Donato Ocampo MD  5/6/2025 12:27 PM EDT  Workstation ID: [id number]    CT Head Without Contrast  Result Date: 5/6/2025  Impression: No acute intracranial findings are evident, within the confines of motion degradation. Electronically Signed: Donato Ocampo MD  5/6/2025 9:17 AM EDT  Workstation ID: [id number]    CT Chest Without Contrast Diagnostic  Result Date: 5/6/2025  Impression: No acute intrathoracic findings. Motion-degraded exam. Electronically Signed: Donato Ocampo MD  5/6/2025 9:13 AM EDT  Workstation ID: [id number]    Mammo Diagnostic Digital Tomosynthesis Bilateral With CAD  Result Date: 4/16/2025  1. The palpable mass the patient feels corresponds to a dominant 1.5 cm mass with associated pleomorphic calcifications which span 2 cm posterior to this located at 1:00, 4 cm from the. There are 2 additional satellite masses measuring 0.5 cm in size respectively. Altogether these masses span over 4.5 cm of tissue from anterior to posterior. There is no evidence of axillary adenopathy.  2. Negative right breast mammogram.  RECOMMENDATION: Recommend ultrasound-guided core biopsy of the dominant 1.5 cm mass in the left 1:00 distribution. As well as the adjacent 0.5 cm mass which is located the farthest away from the dominant mass.  ACR BI-RADS CATEGORY: 5, HIGHLY SUGGESTIVE OF MALIGNANCY  CAD was utilized.  The standard false-negative rate of mammography is between 10% and 25%. Complex patterns or increased breast density will markedly elevate  the false-negative rate of mammography.    A letter, in lay terminology, with the results of this exam was given to the patient at the time of the visit.  At our facility, a triangular marker is positioned over a palpable area of concern indicated by the patient. A [place] marker is placed over a visible skin lesion. A linear marker indicates a scar.  __________________________________________________________ Physician Order  Ultrasound Guided Breast Biopsy  Diagnosis: Abnormal Mammogram  4/16/2025 3:20 PM by Dr. Katt Freitas MD on Workstation: [email or web address]      US Breast Left Limited  Result Date: 4/16/2025  1. The palpable mass the patient feels corresponds to a dominant 1.5 cm mass with associated pleomorphic calcifications which span 2 cm posterior to this located at 1:00, 4 cm from the. There are 2 additional satellite masses measuring 0.5 cm in size respectively. Altogether these masses span over 4.5 cm of tissue from anterior to posterior. There is no evidence of axillary adenopathy.  2. Negative right breast mammogram.  RECOMMENDATION: Recommend ultrasound-guided core biopsy of the dominant 1.5 cm mass in the left 1:00 distribution. As well as the adjacent 0.5 cm mass which is located the farthest away from the dominant mass.  ACR BI-RADS CATEGORY: 5, HIGHLY SUGGESTIVE OF MALIGNANCY  CAD was utilized.  The standard false-negative rate of mammography is between 10% and 25%. Complex patterns or increased breast density will markedly elevate the false-negative rate of mammography.    A letter, in lay terminology, with the results of this exam was given to the patient at the time of the visit.  At our facility, a triangular marker is positioned over a palpable area of concern indicated by the patient. A [place] marker is placed over a visible skin lesion. A linear marker indicates a scar.  __________________________________________________________ Physician Order  Ultrasound Guided Breast Biopsy  Diagnosis:  Abnormal Mammogram  4/16/2025 3:20 PM by Dr. Katt Freitas MD on Workstation: [id number]      Reviewed the patient's biopsies the ultrasound in April.  I reviewed the patient's lumpectomy specimen pathology in June.    Assessment      IMPRESSION:     Patient is a very pleasant 65-year-old female with a left breast invasive ductal carcinoma grade 2 [id number] (mi) ER/[place] positive HER2 negative malignancy status postlumpectomy with Dr. Goncalves on 6/9/2025.  The patient had widely negative margins, negative LVSI, and minimal other risk features at the time of her surgery.  The patient is recovering very well from her surgery.    The patient's Oncotype DX score was 29 and the patient will be receiving chemotherapy with Dr. Peter.  We discussed that we will wait till after her chemotherapy to proceed with radiation.  We will schedule her for a follow-up in a few months to make sure that we do not lose track of her.     We discussed the following lumpectomy radiation would be a good option for her.  We previously discussed anticipated acute, subacute, chronic side effects with course of radiotherapy.  The patient would likely benefit from whole breast radiotherapy.  I recommend 15 fractions dose of 42.5 Gray delivered daily.  The patient would need DIBH.  We we will deliver the radiation following treatment with chemotherapy.  She has plans for 4 cycles of TC chemotherapy with Dr. Peter..      I would like to touch base with the patient after she discusses things with Dr. Peter.      I spent 20 minutes in the patient's case today.      RECOMMENDATIONS:    Left breast base of ductal carcinoma  - Grade 2  - pT1c: 1.8 cm  - ER/[place] positive HER2 negative  - PN1 (MI) in 1 of 2 sentinel lymph nodes  -S/p left lumpectomy  -Follows with Dr. Peter  -Oncotype Dx score of 29  -Will be receiving 4 cycles of TC chemo with Dr. Peter.  - Would recommend adjuvant radiotherapy 42.5 Gray in 15 fractions after chemotherapy  completion  - Will follow-up with Dr. Goncalves in the near future  - Will follow-up with Dr. Peter in the near future  - Will schedule the patient for a check in in 3 months to see how she is doing or would be happy to see her back sooner if she finishes up chemo before then.           Elliott Borrero MD        Errors in dictation may reflect use of voice recognition software and not all errors in transcription may have been detected prior to signing.

## 2025-07-28 ENCOUNTER — TRANSCRIBE ORDERS (OUTPATIENT)
Dept: GENERAL RADIOLOGY | Facility: HOSPITAL | Age: 65
End: 2025-07-28
Payer: MEDICARE

## 2025-07-28 DIAGNOSIS — C50.512 MALIGNANT NEOPLASM OF LOWER-OUTER QUADRANT OF LEFT FEMALE BREAST, UNSPECIFIED ESTROGEN RECEPTOR STATUS: Primary | ICD-10-CM

## 2025-07-29 ENCOUNTER — HOSPITAL ENCOUNTER (OUTPATIENT)
Dept: GENERAL RADIOLOGY | Facility: HOSPITAL | Age: 65
Discharge: HOME | End: 2025-07-29
Payer: MEDICARE

## 2025-07-29 ENCOUNTER — OFFICE VISIT (OUTPATIENT)
Dept: ONCOLOGY | Facility: CLINIC | Age: 65
End: 2025-07-29
Payer: MEDICARE

## 2025-07-29 ENCOUNTER — HOSPITAL ENCOUNTER (OUTPATIENT)
Dept: ONCOLOGY | Facility: HOSPITAL | Age: 65
Discharge: HOME | End: 2025-07-29
Payer: MEDICARE

## 2025-07-29 ENCOUNTER — SPECIALTY PHARMACY (OUTPATIENT)
Dept: ONCOLOGY | Facility: HOSPITAL | Age: 65
End: 2025-07-29
Payer: MEDICARE

## 2025-07-29 VITALS
TEMPERATURE: 97.3 F | RESPIRATION RATE: 16 BRPM | WEIGHT: 193.4 LBS | DIASTOLIC BLOOD PRESSURE: 67 MMHG | BODY MASS INDEX: 33.02 KG/M2 | HEART RATE: 64 BPM | OXYGEN SATURATION: 94 % | HEIGHT: 64 IN | SYSTOLIC BLOOD PRESSURE: 142 MMHG

## 2025-07-29 DIAGNOSIS — C50.412 MALIGNANT NEOPLASM OF UPPER-OUTER QUADRANT OF LEFT BREAST IN FEMALE, ESTROGEN RECEPTOR POSITIVE: Primary | ICD-10-CM

## 2025-07-29 DIAGNOSIS — Z45.2 ENCOUNTER FOR CARE RELATED TO VASCULAR ACCESS PORT: ICD-10-CM

## 2025-07-29 DIAGNOSIS — Z17.0 MALIGNANT NEOPLASM OF UPPER-OUTER QUADRANT OF LEFT BREAST IN FEMALE, ESTROGEN RECEPTOR POSITIVE: Primary | ICD-10-CM

## 2025-07-29 DIAGNOSIS — C50.512 MALIGNANT NEOPLASM OF LOWER-OUTER QUADRANT OF LEFT FEMALE BREAST, UNSPECIFIED ESTROGEN RECEPTOR STATUS: ICD-10-CM

## 2025-07-29 LAB
ALBUMIN SERPL-MCNC: 4.1 G/DL (ref 3.5–5.2)
ALBUMIN/GLOB SERPL: 1.1 G/DL
ALP SERPL-CCNC: 97 U/L (ref 39–117)
ALT SERPL W P-5'-P-CCNC: 9 U/L (ref 1–33)
ANION GAP SERPL CALCULATED.3IONS-SCNC: 8 MMOL/L (ref 5–15)
AST SERPL-CCNC: 19 U/L (ref 1–32)
BASOPHILS # BLD AUTO: 0.03 10*3/MM3 (ref 0–0.2)
BASOPHILS NFR BLD AUTO: 0.4 % (ref 0–1.5)
BILIRUB SERPL-MCNC: 0.2 MG/DL (ref 0–1.2)
BUN SERPL-MCNC: 14.6 MG/DL (ref 8–23)
BUN/CREAT SERPL: 17.2 (ref 7–25)
CALCIUM SPEC-SCNC: 9.2 MG/DL (ref 8.6–10.5)
CHLORIDE SERPL-SCNC: 103 MMOL/L (ref 98–107)
CO2 SERPL-SCNC: 29 MMOL/L (ref 22–29)
CREAT SERPL-MCNC: 0.85 MG/DL (ref 0.57–1)
DEPRECATED RDW RBC AUTO: 43.6 FL (ref 37–54)
EGFRCR SERPLBLD CKD-EPI 2021: 75.2 ML/MIN/1.73
EOSINOPHIL # BLD AUTO: 0 10*3/MM3 (ref 0–0.4)
EOSINOPHIL NFR BLD AUTO: 0 % (ref 0.3–6.2)
ERYTHROCYTE [DISTWIDTH] IN BLOOD BY AUTOMATED COUNT: 12.5 % (ref 12.3–15.4)
GLOBULIN UR ELPH-MCNC: 3.8 GM/DL
GLUCOSE SERPL-MCNC: 137 MG/DL (ref 65–99)
HCT VFR BLD AUTO: 37 % (ref 34–46.6)
HGB BLD-MCNC: 11.4 G/DL (ref 12–15.9)
IMM GRANULOCYTES # BLD AUTO: 0.03 10*3/MM3 (ref 0–0.05)
IMM GRANULOCYTES NFR BLD AUTO: 0.4 % (ref 0–0.5)
LYMPHOCYTES # BLD AUTO: 0.65 10*3/MM3 (ref 0.7–3.1)
LYMPHOCYTES NFR BLD AUTO: 7.8 % (ref 19.6–45.3)
MCH RBC QN AUTO: 29.5 PG (ref 26.6–33)
MCHC RBC AUTO-ENTMCNC: 30.8 G/DL (ref 31.5–35.7)
MCV RBC AUTO: 95.9 FL (ref 79–97)
MONOCYTES # BLD AUTO: 0.05 10*3/MM3 (ref 0.1–0.9)
MONOCYTES NFR BLD AUTO: 0.6 % (ref 5–12)
NEUTROPHILS NFR BLD AUTO: 7.58 10*3/MM3 (ref 1.7–7)
NEUTROPHILS NFR BLD AUTO: 90.8 % (ref 42.7–76)
NRBC BLD AUTO-RTO: 0 /100 WBC (ref 0–0.2)
PLATELET # BLD AUTO: 230 10*3/MM3 (ref 140–450)
PMV BLD AUTO: 10.6 FL (ref 6–12)
POTASSIUM SERPL-SCNC: 4.1 MMOL/L (ref 3.5–5.2)
PROT SERPL-MCNC: 7.9 G/DL (ref 6–8.5)
RBC # BLD AUTO: 3.86 10*6/MM3 (ref 3.77–5.28)
SODIUM SERPL-SCNC: 140 MMOL/L (ref 136–145)
WBC NRBC COR # BLD AUTO: 8.34 10*3/MM3 (ref 3.4–10.8)

## 2025-07-29 PROCEDURE — 25010000002 HEPARIN LOCK FLUSH PER 10 UNITS: Performed by: INTERNAL MEDICINE

## 2025-07-29 PROCEDURE — 96523 IRRIG DRUG DELIVERY DEVICE: CPT

## 2025-07-29 PROCEDURE — 1159F MED LIST DOCD IN RCRD: CPT | Performed by: NURSE PRACTITIONER

## 2025-07-29 PROCEDURE — 99214 OFFICE O/P EST MOD 30 MIN: CPT | Performed by: NURSE PRACTITIONER

## 2025-07-29 PROCEDURE — 36591 DRAW BLOOD OFF VENOUS DEVICE: CPT

## 2025-07-29 PROCEDURE — 85025 COMPLETE CBC W/AUTO DIFF WBC: CPT | Performed by: INTERNAL MEDICINE

## 2025-07-29 PROCEDURE — 80053 COMPREHEN METABOLIC PANEL: CPT | Performed by: INTERNAL MEDICINE

## 2025-07-29 PROCEDURE — 71045 X-RAY EXAM CHEST 1 VIEW: CPT

## 2025-07-29 PROCEDURE — 1160F RVW MEDS BY RX/DR IN RCRD: CPT | Performed by: NURSE PRACTITIONER

## 2025-07-29 PROCEDURE — 1126F AMNT PAIN NOTED NONE PRSNT: CPT | Performed by: NURSE PRACTITIONER

## 2025-07-29 RX ORDER — HEPARIN SODIUM (PORCINE) LOCK FLUSH IV SOLN 100 UNIT/ML 100 UNIT/ML
500 SOLUTION INTRAVENOUS AS NEEDED
Status: DISCONTINUED | OUTPATIENT
Start: 2025-07-29 | End: 2025-07-30 | Stop reason: HOSPADM

## 2025-07-29 RX ORDER — LIDOCAINE AND PRILOCAINE 25; 25 MG/G; MG/G
1 CREAM TOPICAL AS NEEDED
Qty: 30 G | Refills: 3 | Status: SHIPPED | OUTPATIENT
Start: 2025-07-29

## 2025-07-29 RX ORDER — HEPARIN SODIUM (PORCINE) LOCK FLUSH IV SOLN 100 UNIT/ML 100 UNIT/ML
500 SOLUTION INTRAVENOUS AS NEEDED
Status: CANCELLED | OUTPATIENT
Start: 2025-07-29

## 2025-07-29 RX ADMIN — Medication 500 UNITS: at 13:50

## 2025-07-29 NOTE — PROGRESS NOTES
CHEMOTHERAPY PREPARATION    Joy Thornton  [id number]  1960    Chief Complaint: Treatment preparation and needs assessment    History of present illness:  Joy Thornton is a 65 y.o. year old female who is here today with her son for treatment preparation and needs assessment.  The patient has been diagnosed with breast cancer and is scheduled to begin treatment with DOCEtaxel / Cyclophosphamide.     Oncology History:    Oncology/Hematology History   Malignant neoplasm of female breast   5/6/2025 Initial Diagnosis    Malignant neoplasm of female breast     7/30/2025 -  Chemotherapy    OP BREAST TC DOCEtaxel / Cyclophosphamide       The current medication list and allergy list were reviewed and reconciled.     Past Medical History, Past Surgical History, Social History, Family History have been reviewed and are without significant changes except as mentioned.    Review of Systems:    Review of Systems   Constitutional:  Positive for appetite change. Negative for fatigue, fever and unexpected weight change.   HENT:  Negative for mouth sores, sore throat and trouble swallowing.    Respiratory:  Negative for cough, shortness of breath and wheezing.    Cardiovascular:  Negative for chest pain, palpitations and leg swelling.   Gastrointestinal:  Negative for abdominal distention, abdominal pain, constipation, diarrhea, nausea and vomiting.   Genitourinary:  Negative for difficulty urinating, dysuria and frequency.   Musculoskeletal:  Negative for arthralgias.   Skin:  Negative for pallor, rash and wound.   Neurological:  Negative for dizziness and weakness.   Hematological:  Does not bruise/bleed easily.   Psychiatric/Behavioral:  Positive for sleep disturbance. Negative for confusion. The patient is not nervous/anxious.      Physical Exam:    Vitals:    07/29/25 1529   BP: 142/67   Pulse: 64   Resp: 16   Temp: 97.3 °F (36.3 °C)   SpO2: 94%     Vitals:    07/29/25 1529   PainSc: 0-No pain          ECOG:  (1) Restricted in Physically Strenuous Activity, Ambulatory & Able to Do Work of Light Nature    General: well appearing, in no acute distress    Psych: Mood is stable            NEEDS ASSESSMENTS    Genetics  The patient's new diagnosis and family history have been reviewed for genetic counseling needs. A genetic referral is not recommended.     Psychosocial  The patient has completed a PHQ-9 Depression Screening and the Distress Thermometer (DT) today.   PHQ-9 results show 1-4 (Minimal Depression). The patient scored their distress today as 3 on a scale of 0-10 with 0 being no distress and 10 being extreme distress.   Problems marked by the patient as being an issue for them within the last week include practical problems, emotional problems, and physical problems.   Results were reviewed along with psychosocial resources offered by our cancer center.  Our oncology social worker will be flagged for a DT score of 4 or above, and a same day call will be made for a score of 9 or 10.  A mental health referral is offered at this time. The patient is not interested in a referral to [facility].   Copies of patient's questionnaires will be scanned into EMR for details and further reference.    Barriers to care  A barriers form was also completed by the patient today. We discussed services offered by our facility to help her have adequate access to care. The patient was given the name and contact information for our Oncology Social Worker, eJssica Aguilera.  Based upon barriers assessment today, the patient will not require a follow-up call from the  to further discuss needs.   A copy of the barriers form will also be scanned into EMR for details and further reference.     VAD Assessment  The patient and I discussed planned intervenous chemotherapy as well as other IV treatments that are often needed throughout the course of treatment. These may include, but are not limited to blood transfusions,  "antibiotics, and IV hydration. The vasculature does appear to be adequate for multiple peripheral IVs throughout their treatment course.  Patient has Port-A-Cath.    Advanced Care Planning  The patient and I discussed advanced care planning, \"Conversations that Matter\".   This service was offered, free of charge, for development of advance directives with a certified ACP facilitator.  The patient does not have an up-to-date advanced directive. This document is not on file with our office. The patient is not interested in an appointment with one of our facilitators to create or update their advanced directives.      Palliative Care  The patient and I discussed palliative care services. Palliative care is not the same as Hospice care. This is specialized medical care for people living with serious illness with the goal of improving quality of life for the patient and their family. [facility] offers our patients outpatient palliative care early along with their treatment to assist in coordination of care, symptom management, pain management, and medical decision making.  Oncology criteria for palliative care referral is not met at this time. The patient is not interested in a palliative care consultation.     Additional Referral needs  none      CHEMOTHERAPY EDUCATION    Chemotherapy education completed and consent obtained per oncology pharmacist.  See their documentation for further details.    Booklets Given: Chemotherapy and You [x]  Nutrition for the Patient with Cancer During Treatment [x]    Sexuality/Fertility Books []     Chemotherapy Regimen:   Treatment Plans       Name Type Plan Dates Plan Provider         Active    OP BREAST TC DOCEtaxel / Cyclophosphamide ONCOLOGY TREATMENT 7/28/2025 - Present Jacquelyn Peter MD                 Chemotherapy education comprehension reviewed. Questions answered.    Assessment and Plan:    Diagnoses and all orders for this visit:    1. Malignant neoplasm of upper-outer quadrant " of left breast in female, estrogen receptor positive (Primary)  -     lidocaine-prilocaine (EMLA) 2.5-2.5 % cream; Apply 1 Application topically to the appropriate area as directed As Needed (45-60 minutes prior to port access.  Cover with saran/plastic wrap.).  Dispense: 30 g; Refill: 3  -     Prosthetic Cranial    The patient and I have reviewed their cancer diagnosis and scheduled treatment plan. Needs assessment was completed including genetics, psychosocial needs, barriers to care, VAD evaluation, advanced care planning, and palliative care services. Referrals have been ordered as appropriate based upon our evaluation and patient desires.     Chemotherapy teaching was also completed today per pharmacy.  Adequate time was given to answer questions.  Patient and family are aware of their care team members and contact information if they have questions or problems throughout the treatment course. The patient is adequately prepared to begin treatment as scheduled tomorrow.     Reviewed with patient education regarding EMLA cream, dexamethasone and Zofran prescriptions sent to pharmacy.       Patient had pretreatment labs drawn today.    I spent 30 minutes caring for Joy on this date of service. This time includes time spent by me in the following activities: preparing for the visit, reviewing tests, performing a medically appropriate examination and/or evaluation, counseling and educating the patient/family/caregiver, ordering medications, tests, or procedures, documenting information in the medical record, and care coordination.     Shanya Shaver, APRN  07/29/25

## 2025-07-29 NOTE — PROGRESS NOTES
Outpatient Infusion  [address] [place] [address]  [phone]      CHEMOTHERAPY EDUCATION    NAME:  Joy Thornton      : 1960           DATE: 25    Medication Education Sheets: (select all that apply)  Cyclophosphamide, Docetaxel, and Pegfilgrastim    Other Education Sheets: (select all that apply)  CINV, Diarrhea, Symptom Tracker Sheet, and [facility] OnBody Patient Guide    Chemotherapy Regimen:   OP BREAST TC DOCEtaxel / Cyclophosphamide every 21 days    TOPICS COMMENTS   ANEMIA:  role of RBC, cause, s/s, ways to manage, role of transfusion Reviewed the role of RBC and the use of transfusions if hemoglobin decreases too much.  Patient to notify us if she experiences shortness of breath, dizziness, or palpitations.  Also let patient know she could feel more tired than usual and to try to stay active, but rest if she needs to.    THROMBOCYTOPENIA:  role of platelet, cause, s/s, ways to prevent bleeding, things to avoid, when to seek help Reviewed the role of platelets in blood clotting and when to call clinic (bloody nose that bleeds for 5 minutes despite pressure, a cut that won't stop bleeding despite pressure, gums that bleed excessively with brushing or flossing). Recommend using a soft bristle toothbrush and blowing the nose gently.    NEUTROPENIA:  role of WBC, cause, infection precautions, s/s of infection, when to call MD Reviewed the role of WBC, good infection prevention practices, and when to call the clinic (temperature 100.4F, sore throat, burning urination, etc).   DIARRHEA:  causes, s/s of dehydration, ways to manage, dietary changes, when to call MD Discussed the risk of diarrhea. Instructed patient on use OTC loperamide with diarrhea onset, but emphasized the importance of calling the clinic if 4-6 episodes in 24 hours not relieved by OTC loperamide.   NAUSEA & VOMITING:  cause, use of antiemetics, dietary changes, when to call MD Emetic risk:  Moderate  Premeds: Dexamethasone (oral) and Palonosetron  PRN home meds: Ondansetron 8mg PO every 8 hours PRN nausea / vomiting  Pharmacy home meds sent to: [facility] in [place]     Instructed the patient to take a dose of the PRN medication at the first onset of nausea and if it's not working to call us for additional medications.  Also provided non-drug measures to mitigate nausea.   MOUTH SORES:  causes, oral care, ways to manage The patient was encouraged to make a mouth wash mixture of salt, baking soda, and water or to use a non-alcohol containing OTC mouthwash to swish and spit four times daily after meals and before bedtime, with the goal of preventing mouth sores and keeping the mouth clean.  Use of a soft bristle toothbrush was recommended.    ALOPECIA:  cause, ways to manage, resources Discussed the possibility of hair loss with the patient. Informed patient that she could request a prescription for a wig if desired and most of the cost is usually covered by insurance. Recommended covering the head with a hat and/or protecting the skin on the head with SPF 30 or higher. Discussed cold-capping.   NERVOUS SYSTEM CHANGES:  causes, s/s, neuropathies, cognitive changes, ways to manage Peripheral Neuropathy: Discussed the adverse effect of peripheral neuropathy and signs/symptoms associated with this adverse effect. Instructed patient to call if symptoms become more frequent or worsen. Discussed cryocompression therapy.   PAIN:  causes, ways to manage Discussed muscle and joint aches/pains with therapy, and recommended the use of OTC pain relief with ibuprofen or acetaminophen if needed.   Growth Factors: Discussed the possibility for bone pain with pegfilgrastim, and reviewed the use of over-the-counter loratadine 10 mg by mouth at night on days 2-8 of each cycle to help manage this side effect.   SKIN & NAIL CHANGES:  cause, s/s, ways to manage Fingernails/Toenails: Informed the patient of the  possibility for dark or white lines on finger and toenails during treatment, and that nails may become brittle and even fall off in extreme cases. This will likely reverse after treatment concludes.   Generalized Rash: Discussed the potential for a rash or itchy skin, offered nonpharmacologic prevention strategies, and instructed the patient to call if a rash develops and worsens.   ORGAN TOXICITIES:  cause, s/s, need for diagnostic tests, labs, when to notify MD Discussed potential effects on organ systems, monitoring, diagnostic tests, labs, and when to notify the clinic. Discussed the signs/symptoms of the following: lung changes.   INFUSION RELATED REACTIONS or INJECTION-SITE REACTION:  Cause, s/s, anaphylaxis, monitoring, etc. Premeds: Dexamethasone (oral)     Discussed the risk of an infusion reaction and symptoms such as: fever, chills, dizziness, itchiness or rash, flushing, trouble breathing, wheezing, sudden back pain, or feeling faint.  Instructed the patient to notify her nurse if she starts feeling weird at any point during her infusion.     Reviewed how infusion reactions are managed.   SURVIVORSHIP:  distress, distress assessment, secondary malignancies, early/late effects, follow-up, social issues, social support Discussed the rare, but possible risk of secondary malignancies months to years after treatment, most commonly acute myeloid leukemia.   MISCELLANEOUS:  drug interactions, administration, labs, etc. Discussed chemotherapy schedule, lab draws, infusion times, and total expected visit time.   DDIs: No significant DDIs.  Lab draws: On or before day 1 of each cycle, no sooner than 3 days early.  Reviewed the possibility for hemorrhagic cystitis and emphasized the importance of adequate hydration and frequent voiding of the bladder.  Fluid Retention: Explained the signs/symptoms of fluid retention around ankles, feet, and possibly in the hands.  Reviewed strategies to minimize this and  recommended that the patient call the clinic if he/she gains 5 or more pounds in 1 week or experiences shortness of breath without exertion.  Discussed use of dexamethasone as prevention - dexamethasone 4 mg tablets - Take 2 tablets by mouth twice daily with food (breakfast and lunch) the day before, the day of, and the day after chemotherapy to prevent fluid retention.  Prescription sent to [facility] in [place].    INFERTILITY & SEXUALITY:    causes, fertility preservation options, sexuality changes, ways to manage, importance of birth control IV Oncology Therapy: Reviewed safe sex practices and the importance of minimizing exposure to body fluids for 7 days after each dose of IV oncology therapy. The patient is not of childbearing potential.     HOME CARE:  storing of oral chemo, how to manage bodily fluids IV - Counseled on management of soiled linens and proper flush technique.  Discussed how to manage all the side effects at home and advised when to contact the MD office.     Medications:  Prior to Admission medications    Medication Sig Start Date End Date Taking? Authorizing Provider   dexAMETHasone (DECADRON) 4 MG tablet Take 2 tablets oral twice a day for 3 consecutive days beginning the day before chemotherapy and continue for 6 doses. 7/21/25   Jacquelyn Peter MD   Fluticasone-Umeclidin-Vilant (Trelegy Ellipta) 100-62.5-25 MCG/ACT inhaler Inhale 1 puff Daily. 5/7/25   Fifi Scott APRN   gabapentin (NEURONTIN) 600 MG tablet Take 1 tablet by mouth 4 (Four) Times a Day.    Provider, MD Myles   methadone (DOLOPHINE) 5 MG/5ML solution Take 105 mL by mouth Daily.    Provider, MD Myles   ondansetron (ZOFRAN) 8 MG tablet Take 1 tablet by mouth 3 (Three) Times a Day As Needed for Nausea or Vomiting. 7/21/25   Jacquelyn Peter MD   traMADol (ULTRAM) 50 MG tablet Take 1-2 tablets by mouth Every 6 (Six) Hours As Needed for Moderate Pain. 7/16/25   Jacquelyn Peter MD     Notes: All  questions and concerns were addressed. Provided a personalized treatment calendar to patient (includes treatment and lab schedule). Provided patient with contact information for the pharmacist and clinic while instructing her to call if any questions or concerns arise. Informed consent for treatment was obtained. Patient was receptive to information and expressed understanding.     Ann Cowden Mayer, PharmD, [facility]  Oncology Clinical Pharmacist  Phone [phone]    7/29/2025  13:56 EDT

## 2025-07-30 ENCOUNTER — OFFICE VISIT (OUTPATIENT)
Dept: ONCOLOGY | Facility: CLINIC | Age: 65
End: 2025-07-30
Payer: MEDICARE

## 2025-07-30 ENCOUNTER — HOSPITAL ENCOUNTER (OUTPATIENT)
Dept: ONCOLOGY | Facility: HOSPITAL | Age: 65
Discharge: HOME | End: 2025-07-30
Admitting: INTERNAL MEDICINE
Payer: MEDICARE

## 2025-07-30 ENCOUNTER — DOCUMENTATION (OUTPATIENT)
Dept: NUTRITION | Facility: HOSPITAL | Age: 65
End: 2025-07-30
Payer: MEDICARE

## 2025-07-30 VITALS
SYSTOLIC BLOOD PRESSURE: 184 MMHG | TEMPERATURE: 97.3 F | DIASTOLIC BLOOD PRESSURE: 83 MMHG | OXYGEN SATURATION: 95 % | WEIGHT: 193 LBS | HEART RATE: 75 BPM | HEIGHT: 64 IN | BODY MASS INDEX: 32.95 KG/M2

## 2025-07-30 DIAGNOSIS — Z45.2 ENCOUNTER FOR CARE RELATED TO VASCULAR ACCESS PORT: Primary | ICD-10-CM

## 2025-07-30 DIAGNOSIS — Z17.0 MALIGNANT NEOPLASM OF UPPER-OUTER QUADRANT OF LEFT BREAST IN FEMALE, ESTROGEN RECEPTOR POSITIVE: Primary | ICD-10-CM

## 2025-07-30 DIAGNOSIS — C50.412 MALIGNANT NEOPLASM OF UPPER-OUTER QUADRANT OF LEFT BREAST IN FEMALE, ESTROGEN RECEPTOR POSITIVE: Primary | ICD-10-CM

## 2025-07-30 DIAGNOSIS — Z17.0 MALIGNANT NEOPLASM OF UPPER-OUTER QUADRANT OF LEFT BREAST IN FEMALE, ESTROGEN RECEPTOR POSITIVE: ICD-10-CM

## 2025-07-30 DIAGNOSIS — C50.412 MALIGNANT NEOPLASM OF UPPER-OUTER QUADRANT OF LEFT BREAST IN FEMALE, ESTROGEN RECEPTOR POSITIVE: ICD-10-CM

## 2025-07-30 PROCEDURE — 25810000003 SODIUM CHLORIDE 0.9 % SOLUTION: Performed by: INTERNAL MEDICINE

## 2025-07-30 PROCEDURE — 25010000002 PEGFILGRASTIM-CBQV 6 MG/0.6ML SOLUTION PREFILLED SYRINGE: Performed by: INTERNAL MEDICINE

## 2025-07-30 PROCEDURE — 96413 CHEMO IV INFUSION 1 HR: CPT

## 2025-07-30 PROCEDURE — 25010000002 PALONOSETRON PER 25 MCG: Performed by: INTERNAL MEDICINE

## 2025-07-30 PROCEDURE — 25010000002 HEPARIN LOCK FLUSH PER 10 UNITS: Performed by: INTERNAL MEDICINE

## 2025-07-30 PROCEDURE — 96375 TX/PRO/DX INJ NEW DRUG ADDON: CPT

## 2025-07-30 PROCEDURE — 25010000002 CYCLOPHOSPHAMIDE 2 GM/10ML SOLUTION 10 ML VIAL: Performed by: INTERNAL MEDICINE

## 2025-07-30 PROCEDURE — 96417 CHEMO IV INFUS EACH ADDL SEQ: CPT

## 2025-07-30 PROCEDURE — 96377 APPLICATON ON-BODY INJECTOR: CPT

## 2025-07-30 PROCEDURE — 25810000003 SODIUM CHLORIDE 0.9 % SOLUTION 250 ML FLEX CONT: Performed by: INTERNAL MEDICINE

## 2025-07-30 PROCEDURE — 25010000002 DOCETAXEL 20 MG/ML SOLUTION 8 ML VIAL: Performed by: INTERNAL MEDICINE

## 2025-07-30 RX ORDER — HYDROCORTISONE SODIUM SUCCINATE 100 MG/2ML
100 INJECTION INTRAMUSCULAR; INTRAVENOUS AS NEEDED
Status: CANCELLED | OUTPATIENT
Start: 2025-07-30

## 2025-07-30 RX ORDER — HEPARIN SODIUM (PORCINE) LOCK FLUSH IV SOLN 100 UNIT/ML 100 UNIT/ML
500 SOLUTION INTRAVENOUS AS NEEDED
OUTPATIENT
Start: 2025-07-30

## 2025-07-30 RX ORDER — PALONOSETRON 0.05 MG/ML
0.25 INJECTION, SOLUTION INTRAVENOUS ONCE
Status: CANCELLED | OUTPATIENT
Start: 2025-07-30

## 2025-07-30 RX ORDER — FAMOTIDINE 10 MG/ML
20 INJECTION, SOLUTION INTRAVENOUS AS NEEDED
Status: CANCELLED | OUTPATIENT
Start: 2025-07-30

## 2025-07-30 RX ORDER — DIPHENHYDRAMINE HYDROCHLORIDE 50 MG/ML
50 INJECTION, SOLUTION INTRAMUSCULAR; INTRAVENOUS AS NEEDED
Status: DISCONTINUED | OUTPATIENT
Start: 2025-07-30 | End: 2025-07-31 | Stop reason: HOSPADM

## 2025-07-30 RX ORDER — HEPARIN SODIUM (PORCINE) LOCK FLUSH IV SOLN 100 UNIT/ML 100 UNIT/ML
500 SOLUTION INTRAVENOUS AS NEEDED
Status: DISCONTINUED | OUTPATIENT
Start: 2025-07-30 | End: 2025-07-31 | Stop reason: HOSPADM

## 2025-07-30 RX ORDER — HYDROCORTISONE SODIUM SUCCINATE 100 MG/2ML
100 INJECTION INTRAMUSCULAR; INTRAVENOUS AS NEEDED
Status: DISCONTINUED | OUTPATIENT
Start: 2025-07-30 | End: 2025-07-31 | Stop reason: HOSPADM

## 2025-07-30 RX ORDER — DIPHENHYDRAMINE HYDROCHLORIDE 50 MG/ML
50 INJECTION, SOLUTION INTRAMUSCULAR; INTRAVENOUS AS NEEDED
Status: CANCELLED | OUTPATIENT
Start: 2025-07-30

## 2025-07-30 RX ORDER — SODIUM CHLORIDE 9 MG/ML
20 INJECTION, SOLUTION INTRAVENOUS ONCE
Status: COMPLETED | OUTPATIENT
Start: 2025-07-30 | End: 2025-07-30

## 2025-07-30 RX ORDER — PALONOSETRON 0.05 MG/ML
0.25 INJECTION, SOLUTION INTRAVENOUS ONCE
Status: COMPLETED | OUTPATIENT
Start: 2025-07-30 | End: 2025-07-30

## 2025-07-30 RX ORDER — SODIUM CHLORIDE 9 MG/ML
20 INJECTION, SOLUTION INTRAVENOUS ONCE
Status: CANCELLED | OUTPATIENT
Start: 2025-07-30

## 2025-07-30 RX ORDER — FAMOTIDINE 10 MG/ML
20 INJECTION, SOLUTION INTRAVENOUS AS NEEDED
Status: DISCONTINUED | OUTPATIENT
Start: 2025-07-30 | End: 2025-07-31 | Stop reason: HOSPADM

## 2025-07-30 RX ORDER — LIDOCAINE AND PRILOCAINE 25; 25 MG/G; MG/G
1 CREAM TOPICAL AS NEEDED
Qty: 30 G | Refills: 3 | Status: SHIPPED | OUTPATIENT
Start: 2025-07-30

## 2025-07-30 RX ADMIN — SODIUM CHLORIDE 20 ML/HR: 9 INJECTION, SOLUTION INTRAVENOUS at 10:15

## 2025-07-30 RX ADMIN — PALONOSETRON HYDROCHLORIDE 0.25 MG: 0.25 INJECTION, SOLUTION INTRAVENOUS at 10:15

## 2025-07-30 RX ADMIN — PEGFILGRASTIM-CBQV 6 MG: 6 INJECTION, SOLUTION SUBCUTANEOUS at 13:17

## 2025-07-30 RX ADMIN — HEPARIN 500 UNITS: 100 SYRINGE at 14:20

## 2025-07-30 RX ADMIN — DOCETAXEL 145 MG: 20 INJECTION, SOLUTION, CONCENTRATE INTRAVENOUS at 10:50

## 2025-07-30 RX ADMIN — SODIUM CHLORIDE 1160 MG: 9 INJECTION, SOLUTION INTRAVENOUS at 12:06

## 2025-07-30 NOTE — PROGRESS NOTES
"Outpatient Oncology Nutrition     Reason for Visit: Oncology Nutrition Screening and Patient Education / Met with patient during her initial chemotherapy infusion appointment.     Patient Name:  Joy Thornton    :  1960    MRN:  [id number]    Date of Encounter: 2025    Nutrition Assessment     Diagnosis: Left breast cancer     Surgery: left breast lumpectomy and sentinel lymph node biopsy on 2025     Chemotherapy: OP BREAST TC DOCEtaxel / Cyclophosphamide - every 21 days x 4 cycles / start date 25     Radiation: recommend adjuvant radiotherapy 42.5 Gray in 15 fractions after chemotherapy completion     Patient Active Problem List:    Patient Active Problem List   Diagnosis    Chronic pain syndrome    COPD (chronic obstructive pulmonary disease)    Malignant neoplasm of female breast    Tobacco use    Encounter for care related to vascular access port       Food / Nutrition Related History   Patient reports she gained some weight after her surgery (lumpectomy on 25) as she quit smoking \"cold turkey\" but states she has lost some of that weight over the past couple of weeks.     Hydration Status   Discussed the importance of hydration, reviewed hydrating fluids, and recommended she increase her intake.     Goal: 96 ounces     How many 8 ounces glasses of water do you consume per day? Patient reports drinking mostly water and ~1 soda daily.     Enteral Feeding       Anthropometric Measurements     Height:    Ht Readings from Last 1 Encounters:   25 162.6 cm (64.02\")       Weight:    Wt Readings from Last 1 Encounters:   25 87.5 kg (193 lb)       BMI:  33.1 - Obese    Weight Change: gained ~7# x ~1 month and has lost ~4# over the past ~2 weeks / per chart review    Review of Lab Data (Time Frame - 1 month / 2 month)   Labs reviewed - 25     Medication Review   MAR reviewed - Zofran and Dexamethasone noted     Nutrition Focused Physical Findings       Nutrition Impact " Symptoms   No problems with eating     Physical Activity   Normal with no limitations    Current Nutritional Intake     Oral diet:  Regular     Intake: oral intake has been normal     Malnutrition Risk Assessment     Recent weight loss over the past 6 months:  Yes    How much weight loss:  1 = 2-13 lbs    Eating poorly because of a decreased appetite:  0 = No    Malnutrition Screening Score:     MST = 0 or 1 Patient not at risk for malnutrition    Nutrition Diagnosis     Problem    Etiology    Signs / Symptoms      Nutrition Intervention   Discussed the importance of good nutrition during her treatment course focusing on adequate calorie, protein, nutrient and fluid intake.  Advised her to be consuming smaller more frequent meals/snacks throughout the day to aid with potential nausea management.  Emphasized the importance of protein and its role in the diet; reviewed high protein foods; and recommended she have a protein source at each meal/snack.  Offered several high protein snack ideas she may find more appealing at this time.  Reviewed the ACS nutrition booklet and suggested she use is as needed to aid with management of potential nutrition impact symptoms.  Also discussed the basics of survivorship nutrition.     Written nutritional resources provided -   Nutritional Considerations in Breast Cancer     Goal   To achieve adequate nutritional and hydration intake.  To aid with nutrition impact symptom management as needed.     Monitoring / Evaluation   Answered her questions and she voiced understanding of information discussed.  RD's contact information provided and encouraged to call with questions.  Will follow up as indicated.     Monisha Velazquez MS, RD, LD

## 2025-07-30 NOTE — PROGRESS NOTES
Hematology and Oncology [facility]  Office number [phone]    Fax number [phone]     Follow up     Date: 25      Patient Name: Joy Thornton  MRN: [id number]  : 1960    Referring Physician: Dr. Nick Goncalves MD    Chief Complaint: Left breast cancer    History of Present Illness: Joy Thornton is a pleasant 65 y.o. female who presents today for evaluation of left breast cancer.    She presented with a palpable mass underwent diagnostic workup.  Diagnostic mammogram 2025 demonstrated a 1.5 cm irregular mass in the anterior upper quadrant of the left breast correlating to her area of palpable concern.  1 cm from the mass is an irregular isodense mass spanning 0.5 cm.  Ultrasound showed the larger dominant mass at 1:00 spanning 1.5 cm and the smaller lateral mass spanning 0.4 cm.  No abnormal axillary lymph nodes.  Juan Luis maybe  Left breast 1:00 0.5 cm oval mass ultrasound-guided biopsy showed grade 1 invasive ductal carcinoma with associated DCIS (ER 80%, [place] 66%, HER2 negative, 0+).  Left breast 1:00 1.5 cm irregular mass 4 cm from the nipple ultrasound-guided biopsy showed grade 2 invasive ductal carcinoma with associated DCIS (ER 90%, NY 90%, HER2 negative, 0+).    She underwent left breast lumpectomy and sentinel lymph node biopsy on 2025.  Lumpectomy pathology demonstrated grade 2 invasive ductal carcinoma with associated DCIS.  Invasive component spanned 1.8 cm with negative margins.  [place] lymph node positive for micrometastasis spanning 0.6 mm in 1 of 2 sentinel nodes. Oncotype 29.    Treatment history:  TC x 4, planning 25    Interval history:  Feeling well today. Here for cycle 1 day 1 chemotherapy. No nausea, vomiting, diarrhea, fever. 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  Basal cell carcinoma of the face  H/o prior left chest wall port for antibiotics.     Past Surgical History:   Past Surgical History:   Procedure Laterality Date     SECTION      x2    CHOLECYSTECTOMY  1998    CLOSED REDUCTION ANKLE FRACTURE  1986    REPLACEMENT TOTAL KNEE  2018       Family History:   Family History   Problem Relation Age of Onset    Pancreatic cancer Mother 45    Esophageal cancer Father 82    No Known Problems Sister     No Known Problems Brother     No Known Problems Maternal Aunt     No Known Problems Paternal Aunt     No Known Problems Maternal Grandmother     No Known Problems Paternal Grandmother     No Known Problems Daughter     No Known Problems Son     Testicular cancer Nephew 2    BRCA 1/2 Neg Hx     Breast cancer Neg Hx     Colon cancer Neg Hx     Endometrial cancer Neg Hx        Social History:   Social History     Socioeconomic History    Marital status:    Tobacco Use    Smoking status: Former     Average packs/day: 3.0 packs/day for 45.4 years (136.3 ttl pk-yrs)     Types: Cigarettes     Start date:      Passive exposure: Current    Smokeless tobacco: Never   Vaping Use    Vaping status: Some Days    Substances: THC, CBD   Substance and Sexual Activity    Alcohol use: Not Currently    Drug use: Not Currently     Types: Marijuana     Comment: history of addition to rx pain pills; on methadone now    Sexual activity: Defer     last year.    Medications:     Current Outpatient Medications:     dexAMETHasone (DECADRON) 4 MG tablet, Take 2 tablets oral twice a day for 3 consecutive days beginning the day before chemotherapy and continue for 6 doses., Disp: 12 tablet, Rfl: 3    Fluticasone-Umeclidin-Vilant (Trelegy Ellipta)  "100-62.5-25 MCG/ACT inhaler, Inhale 1 puff Daily., Disp: 60 each, Rfl: 1    gabapentin (NEURONTIN) 600 MG tablet, Take 1 tablet by mouth 4 (Four) Times a Day., Disp: , Rfl:     lidocaine-prilocaine (EMLA) 2.5-2.5 % cream, Apply 1 Application topically to the appropriate area as directed As Needed (45-60 minutes prior to port access.  Cover with saran/plastic wrap.)., Disp: 30 g, Rfl: 3    methadone (DOLOPHINE) 5 MG/5ML solution, Take 105 mL by mouth Daily., Disp: , Rfl:     ondansetron (ZOFRAN) 8 MG tablet, Take 1 tablet by mouth 3 (Three) Times a Day As Needed for Nausea or Vomiting., Disp: 30 tablet, Rfl: 5    traMADol (ULTRAM) 50 MG tablet, Take 1-2 tablets by mouth Every 6 (Six) Hours As Needed for Moderate Pain., Disp: 10 tablet, Rfl: 0  No current facility-administered medications for this visit.    Allergies:   Allergies   Allergen Reactions    Diclofenac-Misoprostol Irritability       Objective     Vital Signs:   Vitals:    07/30/25 0815   BP: (!) 184/83   Pulse: 75   Temp: 97.3 °F (36.3 °C)   TempSrc: Infrared   SpO2: 95%   Weight: 87.5 kg (193 lb)   Height: 162.6 cm (64.02\")   PainSc: 0-No pain    Body mass index is 33.11 kg/m².   Pain Score    07/30/25 0815   PainSc: 0-No pain       ECOG Performance Status: 0 - Asymptomatic    Physical Exam:   General: No acute distress. Well appearing   HEENT: Normocephalic, atraumatic. Sclera anicteric.   Neck: supple, no adenopathy.   Cardiovascular: regular rate and rhythm. No murmurs.   Respiratory: Normal rate. Clear to auscultation bilaterally  Abdomen: Soft, nontender, non distended with normoactive bowel sounds  Lymph: no cervical, supraclavicular or axillary adenopathy  Neuro: Alert and oriented x 3. No focal deficits.   Ext: Symmetric, no swelling.   Accurate 7/30/25    Laboratory/Imaging Reviewed:   Hospital Outpatient Visit on 07/29/2025   Component Date Value Ref Range Status    Glucose 07/29/2025 137 (H)  65 - 99 mg/dL Final    BUN 07/29/2025 14.6  8.0 - " 23.0 mg/dL Final    Creatinine 07/29/2025 0.85  0.57 - 1.00 mg/dL Final    Sodium 07/29/2025 140  136 - 145 mmol/L Final    Potassium 07/29/2025 4.1  3.5 - 5.2 mmol/L Final    Chloride 07/29/2025 103  98 - 107 mmol/L Final    CO2 07/29/2025 29.0  22.0 - 29.0 mmol/L Final    Calcium 07/29/2025 9.2  8.6 - 10.5 mg/dL Final    Total Protein 07/29/2025 7.9  6.0 - 8.5 g/dL Final    Albumin 07/29/2025 4.1  3.5 - 5.2 g/dL Final    ALT (SGPT) 07/29/2025 9  1 - 33 U/L Final    AST (SGOT) 07/29/2025 19  1 - 32 U/L Final    Alkaline Phosphatase 07/29/2025 97  39 - 117 U/L Final    Total Bilirubin 07/29/2025 0.2  0.0 - 1.2 mg/dL Final    Globulin 07/29/2025 3.8  gm/dL Final    Calculated Result    A/G Ratio 07/29/2025 1.1  g/dL Final    BUN/Creatinine Ratio 07/29/2025 17.2  7.0 - 25.0 Final    Anion Gap 07/29/2025 8.0  5.0 - 15.0 mmol/L Final    eGFR 07/29/2025 75.2  >60.0 mL/min/1.73 Final    WBC 07/29/2025 8.34  3.40 - 10.80 10*3/mm3 Final    RBC 07/29/2025 3.86  3.77 - 5.28 10*6/mm3 Final    Hemoglobin 07/29/2025 11.4 (L)  12.0 - 15.9 g/dL Final    Hematocrit 07/29/2025 37.0  34.0 - 46.6 % Final    MCV 07/29/2025 95.9  79.0 - 97.0 fL Final    MCH 07/29/2025 29.5  26.6 - 33.0 pg Final    MCHC 07/29/2025 30.8 (L)  31.5 - 35.7 g/dL Final    RDW 07/29/2025 12.5  12.3 - 15.4 % Final    RDW-SD 07/29/2025 43.6  37.0 - 54.0 fl Final    MPV 07/29/2025 10.6  6.0 - 12.0 fL Final    Platelets 07/29/2025 230  140 - 450 10*3/mm3 Final    Neutrophil % 07/29/2025 90.8 (H)  42.7 - 76.0 % Final    Lymphocyte % 07/29/2025 7.8 (L)  19.6 - 45.3 % Final    Monocyte % 07/29/2025 0.6 (L)  5.0 - 12.0 % Final    Eosinophil % 07/29/2025 0.0 (L)  0.3 - 6.2 % Final    Basophil % 07/29/2025 0.4  0.0 - 1.5 % Final    Immature Grans % 07/29/2025 0.4  0.0 - 0.5 % Final    Neutrophils, Absolute 07/29/2025 7.58 (H)  1.70 - 7.00 10*3/mm3 Final    Lymphocytes, Absolute 07/29/2025 0.65 (L)  0.70 - 3.10 10*3/mm3 Final    Monocytes, Absolute 07/29/2025 0.05 (L)   0.10 - 0.90 10*3/mm3 Final    Eosinophils, Absolute 07/29/2025 0.00  0.00 - 0.40 10*3/mm3 Final    Basophils, Absolute 07/29/2025 0.03  0.00 - 0.20 10*3/mm3 Final    Immature Grans, Absolute 07/29/2025 0.03  0.00 - 0.05 10*3/mm3 Final    nRBC 07/29/2025 0.0  0.0 - 0.2 /100 WBC Final     XR Chest 1 View  Result Date: 7/29/2025  Narrative: XR CHEST 1 VW Date of Exam: 7/29/2025 9:35 AM EDT Indication: Post port placement Comparison: None available. Findings: Right chest port is noted with the catheter terminating near the confluence of the brachiocephalic veins. The cardiomediastinal silhouette is normal. The lungs are grossly clear. Left axillary surgical clips are noted. No pleural effusion. No pneumothorax.     Impression: Impression: Right chest port as detailed above. No pneumothorax. Electronically Signed: Hector Pedraza MD  7/29/2025 10:20 AM EDT  Workstation ID: [id number]      Procedures    Assessment / Plan      Assessment/Plan:     Left breast cancer  -Historically, postmenopausal lymph node positive breast cancer has been treated with adjuvant chemotherapy in addition to endocrine therapy. More recently, genomic testing has been shown to help to stratify the potential added benefit for an individual patient and identify patients who are less likely to derive significant additional benefit from chemotherapy.  Given N1 disease and age > 50, it would be reasonable to consider omission of chemotherapy if low risk genomic signature. I recommend proceeding with oncotype testing to stratify the potential benefit of adjuvant chemotherapy.  -Oncotype testing 29.  I do recommend adjuvant chemotherapy. We discussed standard treatment options of 1) dose dense Adriamycin and Cytoxan followed by weekly Taxol or 2) Taxotere and Cytoxan. We discussed differences in schedule and toxicities. We specifically discussed an increased risk for cardiotoxicity and late bone marrow disorders with the addition of adriamycin.  -I  recommend TC x 4 cycles.  -NCCN guidelines do not recommend adjuvant Ribociclib for patients with only microscopic fabian disease and less than 2 cm.  -She is being evaluated for adjuvant radiation.  -CBC and CMP reviewed and adequate to proceed with cycle # 1. Orders signed    2. Alopecia risk  -We discussed use of the [place] cooling device.  We discussed success rates of 60-70% grade 1 alopecia for taxane based chemotherapy. She does understand that there is still potential for more significant alopecia.  Planning use    3.  Baseline neuropathy  - Mild secondary to remote right ankle injury  - Cryotherapy for peripheral neuropathy prevention    4. Access  -port    Follow Up:   1 week tox check     Jacquelyn Peter MD  Hematology and Oncology

## 2025-08-05 ENCOUNTER — TELEPHONE (OUTPATIENT)
Dept: ONCOLOGY | Facility: CLINIC | Age: 65
End: 2025-08-05
Payer: MEDICARE

## 2025-08-05 RX ORDER — DIPHENHYDRAMINE, LIDOCAINE, NYSTATIN
KIT ORAL
Qty: 240 ML | Refills: 3 | Status: SHIPPED | OUTPATIENT
Start: 2025-08-05

## 2025-08-07 ENCOUNTER — OFFICE VISIT (OUTPATIENT)
Dept: ONCOLOGY | Facility: CLINIC | Age: 65
End: 2025-08-07
Payer: MEDICARE

## 2025-08-07 ENCOUNTER — LAB (OUTPATIENT)
Dept: LAB | Facility: HOSPITAL | Age: 65
End: 2025-08-07
Payer: MEDICARE

## 2025-08-07 VITALS
TEMPERATURE: 97.1 F | BODY MASS INDEX: 32.1 KG/M2 | HEART RATE: 84 BPM | OXYGEN SATURATION: 96 % | SYSTOLIC BLOOD PRESSURE: 123 MMHG | RESPIRATION RATE: 20 BRPM | WEIGHT: 188 LBS | DIASTOLIC BLOOD PRESSURE: 69 MMHG | HEIGHT: 64 IN

## 2025-08-07 DIAGNOSIS — C50.912 MALIGNANT NEOPLASM OF LEFT BREAST IN FEMALE, ESTROGEN RECEPTOR POSITIVE, UNSPECIFIED SITE OF BREAST: Primary | ICD-10-CM

## 2025-08-07 DIAGNOSIS — Z17.0 MALIGNANT NEOPLASM OF LEFT BREAST IN FEMALE, ESTROGEN RECEPTOR POSITIVE, UNSPECIFIED SITE OF BREAST: Primary | ICD-10-CM

## 2025-08-07 DIAGNOSIS — Z17.0 MALIGNANT NEOPLASM OF LEFT BREAST IN FEMALE, ESTROGEN RECEPTOR POSITIVE, UNSPECIFIED SITE OF BREAST: ICD-10-CM

## 2025-08-07 DIAGNOSIS — C50.912 MALIGNANT NEOPLASM OF LEFT BREAST IN FEMALE, ESTROGEN RECEPTOR POSITIVE, UNSPECIFIED SITE OF BREAST: ICD-10-CM

## 2025-08-07 LAB
ANION GAP SERPL CALCULATED.3IONS-SCNC: 9.4 MMOL/L (ref 5–15)
BASOPHILS # BLD AUTO: 0.03 10*3/MM3 (ref 0–0.2)
BASOPHILS NFR BLD AUTO: 0.1 % (ref 0–1.5)
BUN SERPL-MCNC: 4.8 MG/DL (ref 8–23)
BUN/CREAT SERPL: 5 (ref 7–25)
CALCIUM SPEC-SCNC: 9.2 MG/DL (ref 8.6–10.5)
CHLORIDE SERPL-SCNC: 103 MMOL/L (ref 98–107)
CO2 SERPL-SCNC: 28.6 MMOL/L (ref 22–29)
CREAT SERPL-MCNC: 0.96 MG/DL (ref 0.57–1)
DEPRECATED RDW RBC AUTO: 43 FL (ref 37–54)
EGFRCR SERPLBLD CKD-EPI 2021: 65 ML/MIN/1.73
EOSINOPHIL # BLD AUTO: 0.09 10*3/MM3 (ref 0–0.4)
EOSINOPHIL NFR BLD AUTO: 0.4 % (ref 0.3–6.2)
ERYTHROCYTE [DISTWIDTH] IN BLOOD BY AUTOMATED COUNT: 12.2 % (ref 12.3–15.4)
GLUCOSE SERPL-MCNC: 101 MG/DL (ref 65–99)
HCT VFR BLD AUTO: 38 % (ref 34–46.6)
HGB BLD-MCNC: 12.1 G/DL (ref 12–15.9)
IMM GRANULOCYTES # BLD AUTO: 4.03 10*3/MM3 (ref 0–0.05)
IMM GRANULOCYTES NFR BLD AUTO: 19 % (ref 0–0.5)
LYMPHOCYTES # BLD AUTO: 4.89 10*3/MM3 (ref 0.7–3.1)
LYMPHOCYTES NFR BLD AUTO: 23.1 % (ref 19.6–45.3)
MCH RBC QN AUTO: 30.3 PG (ref 26.6–33)
MCHC RBC AUTO-ENTMCNC: 31.8 G/DL (ref 31.5–35.7)
MCV RBC AUTO: 95.2 FL (ref 79–97)
MONOCYTES # BLD AUTO: 1.19 10*3/MM3 (ref 0.1–0.9)
MONOCYTES NFR BLD AUTO: 5.6 % (ref 5–12)
NEUTROPHILS NFR BLD AUTO: 10.97 10*3/MM3 (ref 1.7–7)
NEUTROPHILS NFR BLD AUTO: 51.8 % (ref 42.7–76)
PLATELET # BLD AUTO: 228 10*3/MM3 (ref 140–450)
PMV BLD AUTO: 10.5 FL (ref 6–12)
POTASSIUM SERPL-SCNC: 3.8 MMOL/L (ref 3.5–5.2)
RBC # BLD AUTO: 3.99 10*6/MM3 (ref 3.77–5.28)
SODIUM SERPL-SCNC: 141 MMOL/L (ref 136–145)
WBC NRBC COR # BLD AUTO: 21.2 10*3/MM3 (ref 3.4–10.8)

## 2025-08-07 PROCEDURE — 1160F RVW MEDS BY RX/DR IN RCRD: CPT | Performed by: NURSE PRACTITIONER

## 2025-08-07 PROCEDURE — 36415 COLL VENOUS BLD VENIPUNCTURE: CPT

## 2025-08-07 PROCEDURE — 80048 BASIC METABOLIC PNL TOTAL CA: CPT

## 2025-08-07 PROCEDURE — 1159F MED LIST DOCD IN RCRD: CPT | Performed by: NURSE PRACTITIONER

## 2025-08-07 PROCEDURE — 1126F AMNT PAIN NOTED NONE PRSNT: CPT | Performed by: NURSE PRACTITIONER

## 2025-08-07 PROCEDURE — 85025 COMPLETE CBC W/AUTO DIFF WBC: CPT

## 2025-08-07 PROCEDURE — 99214 OFFICE O/P EST MOD 30 MIN: CPT | Performed by: NURSE PRACTITIONER

## 2025-08-07 RX ORDER — PROCHLORPERAZINE MALEATE 10 MG
10 TABLET ORAL EVERY 6 HOURS PRN
Qty: 30 TABLET | Refills: 1 | Status: SHIPPED | OUTPATIENT
Start: 2025-08-07

## 2025-08-12 ENCOUNTER — TELEPHONE (OUTPATIENT)
Dept: ONCOLOGY | Facility: CLINIC | Age: 65
End: 2025-08-12
Payer: MEDICARE

## 2025-08-15 DIAGNOSIS — Z17.0 MALIGNANT NEOPLASM OF UPPER-OUTER QUADRANT OF LEFT BREAST IN FEMALE, ESTROGEN RECEPTOR POSITIVE: Primary | ICD-10-CM

## 2025-08-15 DIAGNOSIS — C50.412 MALIGNANT NEOPLASM OF UPPER-OUTER QUADRANT OF LEFT BREAST IN FEMALE, ESTROGEN RECEPTOR POSITIVE: Primary | ICD-10-CM

## 2025-08-20 ENCOUNTER — INFUSION (OUTPATIENT)
Facility: HOSPITAL | Age: 65
End: 2025-08-20
Payer: MEDICARE

## 2025-08-20 ENCOUNTER — OFFICE VISIT (OUTPATIENT)
Dept: ONCOLOGY | Facility: CLINIC | Age: 65
End: 2025-08-20
Payer: MEDICARE

## 2025-08-20 VITALS — OXYGEN SATURATION: 90 % | SYSTOLIC BLOOD PRESSURE: 134 MMHG | HEART RATE: 86 BPM | DIASTOLIC BLOOD PRESSURE: 73 MMHG

## 2025-08-20 VITALS
DIASTOLIC BLOOD PRESSURE: 69 MMHG | TEMPERATURE: 97.1 F | BODY MASS INDEX: 32.1 KG/M2 | HEIGHT: 64 IN | WEIGHT: 188 LBS | OXYGEN SATURATION: 92 % | SYSTOLIC BLOOD PRESSURE: 123 MMHG | HEART RATE: 82 BPM

## 2025-08-20 DIAGNOSIS — Z17.0 MALIGNANT NEOPLASM OF UPPER-OUTER QUADRANT OF LEFT BREAST IN FEMALE, ESTROGEN RECEPTOR POSITIVE: ICD-10-CM

## 2025-08-20 DIAGNOSIS — C50.412 MALIGNANT NEOPLASM OF UPPER-OUTER QUADRANT OF LEFT BREAST IN FEMALE, ESTROGEN RECEPTOR POSITIVE: ICD-10-CM

## 2025-08-20 DIAGNOSIS — C50.912 MALIGNANT NEOPLASM OF LEFT BREAST IN FEMALE, ESTROGEN RECEPTOR POSITIVE, UNSPECIFIED SITE OF BREAST: ICD-10-CM

## 2025-08-20 DIAGNOSIS — Z45.2 ENCOUNTER FOR CARE RELATED TO VASCULAR ACCESS PORT: Primary | ICD-10-CM

## 2025-08-20 DIAGNOSIS — Z17.0 MALIGNANT NEOPLASM OF LEFT BREAST IN FEMALE, ESTROGEN RECEPTOR POSITIVE, UNSPECIFIED SITE OF BREAST: ICD-10-CM

## 2025-08-20 LAB
ALBUMIN SERPL-MCNC: 3.6 G/DL (ref 3.5–5.2)
ALBUMIN/GLOB SERPL: 1.1 G/DL
ALP SERPL-CCNC: 92 U/L (ref 39–117)
ALT SERPL W P-5'-P-CCNC: 11 U/L (ref 1–33)
ANION GAP SERPL CALCULATED.3IONS-SCNC: 8.2 MMOL/L (ref 5–15)
AST SERPL-CCNC: 21 U/L (ref 1–32)
BASOPHILS # BLD AUTO: 0.09 10*3/MM3 (ref 0–0.2)
BASOPHILS NFR BLD AUTO: 1.2 % (ref 0–1.5)
BILIRUB SERPL-MCNC: 0.3 MG/DL (ref 0–1.2)
BUN SERPL-MCNC: 6.6 MG/DL (ref 8–23)
BUN/CREAT SERPL: 8.6 (ref 7–25)
CALCIUM SPEC-SCNC: 9.2 MG/DL (ref 8.6–10.5)
CHLORIDE SERPL-SCNC: 102 MMOL/L (ref 98–107)
CO2 SERPL-SCNC: 30.8 MMOL/L (ref 22–29)
CREAT SERPL-MCNC: 0.77 MG/DL (ref 0.57–1)
DEPRECATED RDW RBC AUTO: 47.8 FL (ref 37–54)
EGFRCR SERPLBLD CKD-EPI 2021: 84.7 ML/MIN/1.73
EOSINOPHIL # BLD AUTO: 0.02 10*3/MM3 (ref 0–0.4)
EOSINOPHIL NFR BLD AUTO: 0.3 % (ref 0.3–6.2)
ERYTHROCYTE [DISTWIDTH] IN BLOOD BY AUTOMATED COUNT: 13.4 % (ref 12.3–15.4)
GLOBULIN UR ELPH-MCNC: 3.4 GM/DL
GLUCOSE SERPL-MCNC: 115 MG/DL (ref 65–99)
HCT VFR BLD AUTO: 35.3 % (ref 34–46.6)
HGB BLD-MCNC: 10.9 G/DL (ref 12–15.9)
IMM GRANULOCYTES # BLD AUTO: 0.01 10*3/MM3 (ref 0–0.05)
IMM GRANULOCYTES NFR BLD AUTO: 0.1 % (ref 0–0.5)
LYMPHOCYTES # BLD AUTO: 1.68 10*3/MM3 (ref 0.7–3.1)
LYMPHOCYTES NFR BLD AUTO: 22 % (ref 19.6–45.3)
MCH RBC QN AUTO: 30.3 PG (ref 26.6–33)
MCHC RBC AUTO-ENTMCNC: 30.9 G/DL (ref 31.5–35.7)
MCV RBC AUTO: 98.1 FL (ref 79–97)
MONOCYTES # BLD AUTO: 0.76 10*3/MM3 (ref 0.1–0.9)
MONOCYTES NFR BLD AUTO: 10 % (ref 5–12)
NEUTROPHILS NFR BLD AUTO: 5.07 10*3/MM3 (ref 1.7–7)
NEUTROPHILS NFR BLD AUTO: 66.4 % (ref 42.7–76)
PLATELET # BLD AUTO: 448 10*3/MM3 (ref 140–450)
PMV BLD AUTO: 10.2 FL (ref 6–12)
POTASSIUM SERPL-SCNC: 4.2 MMOL/L (ref 3.5–5.2)
PROT SERPL-MCNC: 7 G/DL (ref 6–8.5)
RBC # BLD AUTO: 3.6 10*6/MM3 (ref 3.77–5.28)
SODIUM SERPL-SCNC: 141 MMOL/L (ref 136–145)
WBC NRBC COR # BLD AUTO: 7.63 10*3/MM3 (ref 3.4–10.8)

## 2025-08-20 PROCEDURE — 96375 TX/PRO/DX INJ NEW DRUG ADDON: CPT

## 2025-08-20 PROCEDURE — 25010000002 HYDROCORTISONE SOD SUC (PF) 100 MG RECONSTITUTED SOLUTION: Performed by: INTERNAL MEDICINE

## 2025-08-20 PROCEDURE — 25010000002 PALONOSETRON PER 25 MCG: Performed by: INTERNAL MEDICINE

## 2025-08-20 PROCEDURE — 25810000003 SODIUM CHLORIDE 0.9 % SOLUTION: Performed by: INTERNAL MEDICINE

## 2025-08-20 PROCEDURE — 25010000002 FAMOTIDINE 10 MG/ML SOLUTION: Performed by: INTERNAL MEDICINE

## 2025-08-20 PROCEDURE — 85025 COMPLETE CBC W/AUTO DIFF WBC: CPT

## 2025-08-20 PROCEDURE — 80053 COMPREHEN METABOLIC PANEL: CPT

## 2025-08-20 PROCEDURE — 96409 CHEMO IV PUSH SNGL DRUG: CPT

## 2025-08-20 PROCEDURE — 25010000002 DIPHENHYDRAMINE PER 50 MG: Performed by: INTERNAL MEDICINE

## 2025-08-20 PROCEDURE — 96374 THER/PROPH/DIAG INJ IV PUSH: CPT

## 2025-08-20 PROCEDURE — 96413 CHEMO IV INFUSION 1 HR: CPT

## 2025-08-20 PROCEDURE — 25010000002 DOCETAXEL 20 MG/ML SOLUTION 8 ML VIAL: Performed by: INTERNAL MEDICINE

## 2025-08-20 PROCEDURE — 25810000003 SODIUM CHLORIDE 0.9 % SOLUTION 250 ML FLEX CONT: Performed by: INTERNAL MEDICINE

## 2025-08-20 PROCEDURE — 25010000002 HEPARIN LOCK FLUSH PER 10 UNITS: Performed by: INTERNAL MEDICINE

## 2025-08-20 RX ORDER — PROCHLORPERAZINE MALEATE 10 MG
10 TABLET ORAL EVERY 6 HOURS PRN
Qty: 30 TABLET | Refills: 1 | Status: SHIPPED | OUTPATIENT
Start: 2025-08-20

## 2025-08-20 RX ORDER — HYDROCORTISONE SODIUM SUCCINATE 100 MG/2ML
100 INJECTION INTRAMUSCULAR; INTRAVENOUS AS NEEDED
Status: COMPLETED | OUTPATIENT
Start: 2025-08-20 | End: 2025-08-20

## 2025-08-20 RX ORDER — ONDANSETRON 8 MG/1
8 TABLET, FILM COATED ORAL 3 TIMES DAILY PRN
Qty: 30 TABLET | Refills: 5 | Status: SHIPPED | OUTPATIENT
Start: 2025-08-20

## 2025-08-20 RX ORDER — HEPARIN SODIUM (PORCINE) LOCK FLUSH IV SOLN 100 UNIT/ML 100 UNIT/ML
500 SOLUTION INTRAVENOUS AS NEEDED
OUTPATIENT
Start: 2025-08-20

## 2025-08-20 RX ORDER — HEPARIN SODIUM (PORCINE) LOCK FLUSH IV SOLN 100 UNIT/ML 100 UNIT/ML
500 SOLUTION INTRAVENOUS AS NEEDED
Status: DISCONTINUED | OUTPATIENT
Start: 2025-08-20 | End: 2025-08-20 | Stop reason: HOSPADM

## 2025-08-20 RX ORDER — HYDROCORTISONE SODIUM SUCCINATE 100 MG/2ML
100 INJECTION INTRAMUSCULAR; INTRAVENOUS AS NEEDED
Status: CANCELLED | OUTPATIENT
Start: 2025-08-20

## 2025-08-20 RX ORDER — PALONOSETRON 0.05 MG/ML
0.25 INJECTION, SOLUTION INTRAVENOUS ONCE
Status: CANCELLED | OUTPATIENT
Start: 2025-08-20

## 2025-08-20 RX ORDER — DIPHENHYDRAMINE HYDROCHLORIDE 50 MG/ML
50 INJECTION, SOLUTION INTRAMUSCULAR; INTRAVENOUS AS NEEDED
Status: COMPLETED | OUTPATIENT
Start: 2025-08-20 | End: 2025-08-20

## 2025-08-20 RX ORDER — DIPHENHYDRAMINE HYDROCHLORIDE 50 MG/ML
50 INJECTION, SOLUTION INTRAMUSCULAR; INTRAVENOUS AS NEEDED
Status: CANCELLED | OUTPATIENT
Start: 2025-08-20

## 2025-08-20 RX ORDER — FAMOTIDINE 10 MG/ML
20 INJECTION, SOLUTION INTRAVENOUS AS NEEDED
Status: CANCELLED | OUTPATIENT
Start: 2025-08-20

## 2025-08-20 RX ORDER — SODIUM CHLORIDE 9 MG/ML
20 INJECTION, SOLUTION INTRAVENOUS ONCE
Status: COMPLETED | OUTPATIENT
Start: 2025-08-20 | End: 2025-08-20

## 2025-08-20 RX ORDER — SODIUM CHLORIDE 9 MG/ML
20 INJECTION, SOLUTION INTRAVENOUS ONCE
Status: CANCELLED | OUTPATIENT
Start: 2025-08-20

## 2025-08-20 RX ORDER — PALONOSETRON 0.05 MG/ML
0.25 INJECTION, SOLUTION INTRAVENOUS ONCE
Status: COMPLETED | OUTPATIENT
Start: 2025-08-20 | End: 2025-08-20

## 2025-08-20 RX ORDER — FAMOTIDINE 10 MG/ML
20 INJECTION, SOLUTION INTRAVENOUS AS NEEDED
Status: COMPLETED | OUTPATIENT
Start: 2025-08-20 | End: 2025-08-20

## 2025-08-20 RX ADMIN — FAMOTIDINE 20 MG: 10 INJECTION, SOLUTION INTRAVENOUS at 11:58

## 2025-08-20 RX ADMIN — HEPARIN 500 UNITS: 100 SYRINGE at 13:30

## 2025-08-20 RX ADMIN — SODIUM CHLORIDE 20 ML/HR: 9 INJECTION, SOLUTION INTRAVENOUS at 11:26

## 2025-08-20 RX ADMIN — DIPHENHYDRAMINE HYDROCHLORIDE 50 MG: 50 INJECTION, SOLUTION INTRAMUSCULAR; INTRAVENOUS at 11:59

## 2025-08-20 RX ADMIN — HYDROCORTISONE SODIUM SUCCINATE 100 MG: 100 INJECTION, POWDER, FOR SOLUTION INTRAMUSCULAR; INTRAVENOUS at 11:57

## 2025-08-20 RX ADMIN — PALONOSETRON HYDROCHLORIDE 0.25 MG: 0.25 INJECTION INTRAVENOUS at 11:26

## 2025-08-20 RX ADMIN — DOCETAXEL 145 MG: 20 INJECTION, SOLUTION, CONCENTRATE INTRAVENOUS at 11:46

## 2025-08-25 DIAGNOSIS — Z17.0 MALIGNANT NEOPLASM OF UPPER-OUTER QUADRANT OF LEFT BREAST IN FEMALE, ESTROGEN RECEPTOR POSITIVE: Primary | ICD-10-CM

## 2025-08-25 DIAGNOSIS — C50.412 MALIGNANT NEOPLASM OF UPPER-OUTER QUADRANT OF LEFT BREAST IN FEMALE, ESTROGEN RECEPTOR POSITIVE: Primary | ICD-10-CM

## 2025-08-28 ENCOUNTER — OFFICE VISIT (OUTPATIENT)
Dept: ONCOLOGY | Facility: CLINIC | Age: 65
End: 2025-08-28
Payer: MEDICARE

## 2025-08-28 VITALS
OXYGEN SATURATION: 90 % | BODY MASS INDEX: 32.44 KG/M2 | TEMPERATURE: 97.1 F | DIASTOLIC BLOOD PRESSURE: 83 MMHG | SYSTOLIC BLOOD PRESSURE: 148 MMHG | HEIGHT: 64 IN | WEIGHT: 190 LBS | HEART RATE: 82 BPM

## 2025-08-28 DIAGNOSIS — Z09 CHEMOTHERAPY FOLLOW-UP EXAMINATION: Primary | ICD-10-CM

## 2025-08-29 ENCOUNTER — TELEPHONE (OUTPATIENT)
Dept: ONCOLOGY | Facility: CLINIC | Age: 65
End: 2025-08-29
Payer: MEDICARE